# Patient Record
Sex: FEMALE | Race: WHITE | Employment: OTHER | URBAN - METROPOLITAN AREA
[De-identification: names, ages, dates, MRNs, and addresses within clinical notes are randomized per-mention and may not be internally consistent; named-entity substitution may affect disease eponyms.]

---

## 2019-06-04 ENCOUNTER — HOSPITAL ENCOUNTER (OUTPATIENT)
Dept: MAMMOGRAPHY | Age: 74
Discharge: HOME | End: 2019-06-04
Attending: INTERNAL MEDICINE
Payer: MEDICARE

## 2019-06-04 DIAGNOSIS — M85.89 OTHER SPECIFIED DISORDERS OF BONE DENSITY AND STRUCTURE, MULTIPLE SITES: ICD-10-CM

## 2019-06-04 PROCEDURE — 77080 DXA BONE DENSITY AXIAL: CPT

## 2020-11-30 ENCOUNTER — APPOINTMENT (OUTPATIENT)
Dept: GENERAL RADIOLOGY | Age: 75
DRG: 689 | End: 2020-11-30
Attending: EMERGENCY MEDICINE
Payer: MEDICARE

## 2020-11-30 ENCOUNTER — HOSPITAL ENCOUNTER (INPATIENT)
Age: 75
LOS: 10 days | Discharge: NURSING, REHAB OR LONG-TERM CARE | DRG: 689 | End: 2020-12-11
Attending: EMERGENCY MEDICINE | Admitting: HOSPITALIST
Payer: MEDICARE

## 2020-11-30 ENCOUNTER — APPOINTMENT (OUTPATIENT)
Dept: GENERAL RADIOLOGY | Age: 75
DRG: 689 | End: 2020-11-30
Attending: FAMILY MEDICINE
Payer: MEDICARE

## 2020-11-30 ENCOUNTER — APPOINTMENT (OUTPATIENT)
Dept: CT IMAGING | Age: 75
DRG: 689 | End: 2020-11-30
Attending: FAMILY MEDICINE
Payer: MEDICARE

## 2020-11-30 DIAGNOSIS — R50.81 NEUTROPENIC FEVER (HCC): ICD-10-CM

## 2020-11-30 DIAGNOSIS — D70.9 NEUTROPENIC FEVER (HCC): ICD-10-CM

## 2020-11-30 DIAGNOSIS — R29.898 WEAKNESS OF BOTH ARMS: ICD-10-CM

## 2020-11-30 DIAGNOSIS — D70.9 GRANULOCYTOPENIA (HCC): Primary | ICD-10-CM

## 2020-11-30 DIAGNOSIS — R50.9 FEVER, UNSPECIFIED FEVER CAUSE: ICD-10-CM

## 2020-11-30 DIAGNOSIS — R06.02 SOB (SHORTNESS OF BREATH): ICD-10-CM

## 2020-11-30 DIAGNOSIS — N30.00 ACUTE CYSTITIS WITHOUT HEMATURIA: ICD-10-CM

## 2020-11-30 DIAGNOSIS — G93.41 ACUTE METABOLIC ENCEPHALOPATHY: ICD-10-CM

## 2020-11-30 DIAGNOSIS — J18.9 PNEUMONIA OF LEFT LOWER LOBE DUE TO INFECTIOUS ORGANISM: ICD-10-CM

## 2020-11-30 DIAGNOSIS — N39.0 CHRONIC UTI (URINARY TRACT INFECTION): ICD-10-CM

## 2020-11-30 DIAGNOSIS — D46.9 MDS (MYELODYSPLASTIC SYNDROME) (HCC): ICD-10-CM

## 2020-11-30 LAB
ALBUMIN SERPL-MCNC: 2.3 G/DL (ref 3.5–5)
ALBUMIN/GLOB SERPL: 0.8 {RATIO} (ref 1.1–2.2)
ALP SERPL-CCNC: 77 U/L (ref 45–117)
ALT SERPL-CCNC: 21 U/L (ref 12–78)
ANION GAP SERPL CALC-SCNC: 2 MMOL/L (ref 5–15)
APPEARANCE UR: CLEAR
ARTERIAL PATENCY WRIST A: YES
AST SERPL-CCNC: 18 U/L (ref 15–37)
ATRIAL RATE: 102 BPM
BACTERIA URNS QL MICRO: ABNORMAL /HPF
BASE EXCESS BLD CALC-SCNC: 7 MMOL/L
BDY SITE: ABNORMAL
BILIRUB SERPL-MCNC: 0.5 MG/DL (ref 0.2–1)
BILIRUB UR QL: NEGATIVE
BUN SERPL-MCNC: 21 MG/DL (ref 6–20)
BUN/CREAT SERPL: 43 (ref 12–20)
CA-I BLD-SCNC: 1.24 MMOL/L (ref 1.12–1.32)
CALCIUM SERPL-MCNC: 8.6 MG/DL (ref 8.5–10.1)
CALCULATED P AXIS, ECG09: 45 DEGREES
CALCULATED R AXIS, ECG10: 23 DEGREES
CALCULATED T AXIS, ECG11: 47 DEGREES
CHLORIDE SERPL-SCNC: 110 MMOL/L (ref 97–108)
CO2 SERPL-SCNC: 30 MMOL/L (ref 21–32)
COLOR UR: ABNORMAL
COMMENT, HOLDF: NORMAL
CREAT SERPL-MCNC: 0.49 MG/DL (ref 0.55–1.02)
DIAGNOSIS, 93000: NORMAL
EPITH CASTS URNS QL MICRO: ABNORMAL /LPF
GAS FLOW.O2 O2 DELIVERY SYS: ABNORMAL L/MIN
GAS FLOW.O2 SETTING OXYMISER: 1 L/M
GLOBULIN SER CALC-MCNC: 2.8 G/DL (ref 2–4)
GLUCOSE SERPL-MCNC: 112 MG/DL (ref 65–100)
GLUCOSE UR STRIP.AUTO-MCNC: NEGATIVE MG/DL
HCO3 BLD-SCNC: 30.9 MMOL/L (ref 22–26)
HGB UR QL STRIP: ABNORMAL
KETONES UR QL STRIP.AUTO: NEGATIVE MG/DL
LACTATE SERPL-SCNC: 1.4 MMOL/L (ref 0.4–2)
LEUKOCYTE ESTERASE UR QL STRIP.AUTO: NEGATIVE
NITRITE UR QL STRIP.AUTO: NEGATIVE
P-R INTERVAL, ECG05: 170 MS
PCO2 BLD: 46.6 MMHG (ref 35–45)
PH BLD: 7.43 [PH] (ref 7.35–7.45)
PH UR STRIP: 5.5 [PH] (ref 5–8)
PO2 BLD: 83 MMHG (ref 80–100)
POTASSIUM SERPL-SCNC: 4.1 MMOL/L (ref 3.5–5.1)
PROCALCITONIN SERPL-MCNC: 0.11 NG/ML
PROT SERPL-MCNC: 5.1 G/DL (ref 6.4–8.2)
PROT UR STRIP-MCNC: 300 MG/DL
Q-T INTERVAL, ECG07: 316 MS
QRS DURATION, ECG06: 82 MS
QTC CALCULATION (BEZET), ECG08: 411 MS
RBC #/AREA URNS HPF: ABNORMAL /HPF (ref 0–5)
SAMPLES BEING HELD,HOLD: NORMAL
SAO2 % BLD: 96 % (ref 92–97)
SODIUM SERPL-SCNC: 142 MMOL/L (ref 136–145)
SP GR UR REFRACTOMETRY: 1.01 (ref 1–1.03)
SPECIMEN TYPE: ABNORMAL
TROPONIN I SERPL-MCNC: <0.05 NG/ML
UR CULT HOLD, URHOLD: NORMAL
UR CULT HOLD, URHOLD: NORMAL
UROBILINOGEN UR QL STRIP.AUTO: 0.2 EU/DL (ref 0.2–1)
VENTRICULAR RATE, ECG03: 102 BPM
WBC URNS QL MICRO: ABNORMAL /HPF (ref 0–4)

## 2020-11-30 PROCEDURE — 80053 COMPREHEN METABOLIC PANEL: CPT

## 2020-11-30 PROCEDURE — 71045 X-RAY EXAM CHEST 1 VIEW: CPT

## 2020-11-30 PROCEDURE — 36415 COLL VENOUS BLD VENIPUNCTURE: CPT

## 2020-11-30 PROCEDURE — 99218 HC RM OBSERVATION: CPT

## 2020-11-30 PROCEDURE — 74011000258 HC RX REV CODE- 258: Performed by: FAMILY MEDICINE

## 2020-11-30 PROCEDURE — 36600 WITHDRAWAL OF ARTERIAL BLOOD: CPT

## 2020-11-30 PROCEDURE — 73590 X-RAY EXAM OF LOWER LEG: CPT

## 2020-11-30 PROCEDURE — 74011000258 HC RX REV CODE- 258: Performed by: EMERGENCY MEDICINE

## 2020-11-30 PROCEDURE — 96368 THER/DIAG CONCURRENT INF: CPT

## 2020-11-30 PROCEDURE — 96375 TX/PRO/DX INJ NEW DRUG ADDON: CPT

## 2020-11-30 PROCEDURE — 74176 CT ABD & PELVIS W/O CONTRAST: CPT

## 2020-11-30 PROCEDURE — 74011250636 HC RX REV CODE- 250/636: Performed by: EMERGENCY MEDICINE

## 2020-11-30 PROCEDURE — 71250 CT THORAX DX C-: CPT

## 2020-11-30 PROCEDURE — 70450 CT HEAD/BRAIN W/O DYE: CPT

## 2020-11-30 PROCEDURE — 81001 URINALYSIS AUTO W/SCOPE: CPT

## 2020-11-30 PROCEDURE — 87635 SARS-COV-2 COVID-19 AMP PRB: CPT

## 2020-11-30 PROCEDURE — 84484 ASSAY OF TROPONIN QUANT: CPT

## 2020-11-30 PROCEDURE — 93005 ELECTROCARDIOGRAM TRACING: CPT

## 2020-11-30 PROCEDURE — 85025 COMPLETE CBC W/AUTO DIFF WBC: CPT

## 2020-11-30 PROCEDURE — 96365 THER/PROPH/DIAG IV INF INIT: CPT

## 2020-11-30 PROCEDURE — 96366 THER/PROPH/DIAG IV INF ADDON: CPT

## 2020-11-30 PROCEDURE — 96372 THER/PROPH/DIAG INJ SC/IM: CPT

## 2020-11-30 PROCEDURE — 74011250636 HC RX REV CODE- 250/636: Performed by: FAMILY MEDICINE

## 2020-11-30 PROCEDURE — 82803 BLOOD GASES ANY COMBINATION: CPT

## 2020-11-30 PROCEDURE — 84145 PROCALCITONIN (PCT): CPT

## 2020-11-30 PROCEDURE — 99285 EMERGENCY DEPT VISIT HI MDM: CPT

## 2020-11-30 PROCEDURE — 87040 BLOOD CULTURE FOR BACTERIA: CPT

## 2020-11-30 PROCEDURE — 83605 ASSAY OF LACTIC ACID: CPT

## 2020-11-30 RX ORDER — DOCUSATE SODIUM 100 MG/1
100 CAPSULE, LIQUID FILLED ORAL 2 TIMES DAILY
Status: DISCONTINUED | OUTPATIENT
Start: 2020-11-30 | End: 2020-12-11 | Stop reason: HOSPADM

## 2020-11-30 RX ORDER — PREGABALIN 150 MG/1
150 CAPSULE ORAL
COMMUNITY
End: 2021-04-12

## 2020-11-30 RX ORDER — SODIUM CHLORIDE 0.9 % (FLUSH) 0.9 %
5-40 SYRINGE (ML) INJECTION AS NEEDED
Status: DISCONTINUED | OUTPATIENT
Start: 2020-11-30 | End: 2020-12-11 | Stop reason: HOSPADM

## 2020-11-30 RX ORDER — VANCOMYCIN/0.9 % SOD CHLORIDE 1.5G/250ML
1500 PLASTIC BAG, INJECTION (ML) INTRAVENOUS ONCE
Status: COMPLETED | OUTPATIENT
Start: 2020-11-30 | End: 2020-12-01

## 2020-11-30 RX ORDER — SODIUM CHLORIDE 0.9 % (FLUSH) 0.9 %
5-40 SYRINGE (ML) INJECTION EVERY 8 HOURS
Status: DISCONTINUED | OUTPATIENT
Start: 2020-11-30 | End: 2020-12-11 | Stop reason: HOSPADM

## 2020-11-30 RX ORDER — DOXYCYCLINE 50 MG/1
50 CAPSULE ORAL 2 TIMES DAILY
COMMUNITY
End: 2020-12-11

## 2020-11-30 RX ORDER — BETHANECHOL CHLORIDE 10 MG/1
10 TABLET ORAL
COMMUNITY

## 2020-11-30 RX ORDER — POTASSIUM CHLORIDE 750 MG/1
20 CAPSULE, EXTENDED RELEASE ORAL 2 TIMES DAILY
COMMUNITY

## 2020-11-30 RX ORDER — PREGABALIN 75 MG/1
75 CAPSULE ORAL 2 TIMES DAILY
COMMUNITY

## 2020-11-30 RX ORDER — METRONIDAZOLE 500 MG/100ML
500 INJECTION, SOLUTION INTRAVENOUS EVERY 12 HOURS
Status: DISCONTINUED | OUTPATIENT
Start: 2020-11-30 | End: 2020-12-02

## 2020-11-30 RX ORDER — LEVOFLOXACIN 5 MG/ML
500 INJECTION, SOLUTION INTRAVENOUS
Status: COMPLETED | OUTPATIENT
Start: 2020-11-30 | End: 2020-12-01

## 2020-11-30 RX ORDER — HEPARIN SODIUM 5000 [USP'U]/ML
5000 INJECTION, SOLUTION INTRAVENOUS; SUBCUTANEOUS EVERY 8 HOURS
Status: DISCONTINUED | OUTPATIENT
Start: 2020-11-30 | End: 2020-12-11 | Stop reason: HOSPADM

## 2020-11-30 RX ORDER — SODIUM CHLORIDE 9 MG/ML
75 INJECTION, SOLUTION INTRAVENOUS CONTINUOUS
Status: DISCONTINUED | OUTPATIENT
Start: 2020-11-30 | End: 2020-12-06

## 2020-11-30 RX ORDER — ACETAMINOPHEN 325 MG/1
650 TABLET ORAL
Status: DISCONTINUED | OUTPATIENT
Start: 2020-11-30 | End: 2020-12-11 | Stop reason: HOSPADM

## 2020-11-30 RX ADMIN — VANCOMYCIN HYDROCHLORIDE 1500 MG: 10 INJECTION, POWDER, LYOPHILIZED, FOR SOLUTION INTRAVENOUS at 17:08

## 2020-11-30 RX ADMIN — CEFEPIME HYDROCHLORIDE 2 G: 2 INJECTION, POWDER, FOR SOLUTION INTRAVENOUS at 19:08

## 2020-11-30 RX ADMIN — PIPERACILLIN AND TAZOBACTAM 3.38 G: 3; .375 INJECTION, POWDER, LYOPHILIZED, FOR SOLUTION INTRAVENOUS at 13:22

## 2020-11-30 RX ADMIN — SODIUM CHLORIDE 1000 ML: 900 INJECTION, SOLUTION INTRAVENOUS at 13:22

## 2020-11-30 RX ADMIN — LEVOFLOXACIN 500 MG: 5 INJECTION, SOLUTION INTRAVENOUS at 14:54

## 2020-11-30 RX ADMIN — HEPARIN SODIUM 5000 UNITS: 5000 INJECTION INTRAVENOUS; SUBCUTANEOUS at 19:08

## 2020-11-30 NOTE — ED PROVIDER NOTES
HPI .  Patient has a history of chronic pain, degenerative vertebral joint disease, and peptic ulcer disease. Her  reports that she had a spinal fusion June 2019. She has had trouble ambulating to some degree since then.  reports that she had a bad reaction to a sulfa antibiotic March 2020 and lost some function in her hands and feet. Patient went to a urology appointment in [place] 5 days ago. She fell in the bathroom and injured her shins. Since then she has had a lot of trouble with steps. About 12 hours ago  noted that the patient was disoriented and confused. He has been helping her on and off the bedside commode the last 5 days and patient was trying to get up herself and had a blank confused look. EMS came at 4:30 AM but did not transport the patient. This morning the patient was still confused and  called EMS. EMS found a Glascow coma score of 15 with complete orientation although the patient does not answer simple questions very well. They noted a temperature of 100.5 and a pulse ox reading of 85% on room air. EMS started 2 L of oxygen and pulse ox went up to 96%. Patient's  noted that the urine smelled bad and color changed and he suspects a urinary tract infection. Patient has longstanding issues with kidney stones, urinary incontinence and incomplete voiding and is followed at [facility] urology. Patient denies having a headache and neck pain. Past Medical History:  
Diagnosis Date  Chronic pain  Chronic pain disorder 1984-current  Degenerated intervertebral disc  Melanosis of colon  Nausea & vomiting  Psychiatric disorder   
 chronic Pain  PUD (peptic ulcer disease) Past Surgical History:  
Procedure Laterality Date [address] UNLISTED  HX GYN    
 HX ORTHOPAEDIC    
 HX UROLOGICAL    
 NEUROLOGICAL PROCEDURE UNLISTED History reviewed. No pertinent family history. Social History Socioeconomic History  Marital status:  Spouse name: Not on file  Number of children: Not on file  Years of education: Not on file  Highest education level: Not on file Occupational History  Not on file Social Needs  Financial resource strain: Not on file  Food insecurity Worry: Not on file Inability: Not on file  Transportation needs Medical: Not on file Non-medical: Not on file Tobacco Use  Smoking status: Never Smoker Substance and Sexual Activity  Alcohol use: Yes Comment: 3 glasses of wine per night.  Drug use: No  
 Sexual activity: Not on file Lifestyle  Physical activity Days per week: Not on file Minutes per session: Not on file  Stress: Not on file Relationships  Social connections Talks on phone: Not on file Gets together: Not on file Attends Jewish service: Not on file Active member of club or organization: Not on file Attends meetings of clubs or organizations: Not on file Relationship status: Not on file  Intimate partner violence Fear of current or ex partner: Not on file Emotionally abused: Not on file Physically abused: Not on file Forced sexual activity: Not on file Other Topics Concern  Not on file Social History Narrative  Not on file ALLERGIES: Fentanyl and Duloxetine Review of Systems Reason unable to perform ROS: Patient has confusion and trouble answering some simple questions. Vitals:  
 11/30/20 1159 11/30/20 1201 BP: (!) 129/54 Pulse: (!) 103 Resp: 22 Temp: 99.3 °F (37.4 °C) SpO2: 97% 97% Physical Exam 
Vitals signs and nursing note reviewed. Constitutional:   
   Appearance: She is well-developed. HENT:  
   Head: Normocephalic and atraumatic. Eyes:  
   Pupils: Pupils are equal, round, and reactive to light. Neck: Musculoskeletal: Normal range of motion and neck supple. Cardiovascular:  
   Rate and Rhythm: Normal rate and regular rhythm. Heart sounds: Normal heart sounds. No murmur. No friction rub. No gallop. Pulmonary:  
   Effort: Pulmonary effort is normal. No respiratory distress. Breath sounds: No wheezing or rales. Abdominal:  
   Palpations: Abdomen is soft. Tenderness: There is no abdominal tenderness. There is no rebound. Musculoskeletal: Normal range of motion. General: No tenderness. Skin: 
   Findings: No erythema. Neurological:  
   Mental Status: She is alert. Cranial Nerves: No cranial nerve deficit. Comments: Motor; symmetric; oriented to name, president, place, year; she says it is December and it will be December tomorrow Psychiatric:     
   Behavior: Behavior normal.  
 
  
 
MDM Number of Diagnoses or Management Options Granulocytopenia ([facility] [address]):  
Pneumonia of left lower lobe due to infectious organism:  
Total critical care time spent exclusive of procedures:  60 minutes Procedures ED EKG interpretation: 
Rhythm: sinus tachycardia; and regular . Rate (approx.): 102; Axis: normal; P wave: normal; QRS interval: normal ; ST/T wave: normal; in  Lead: ; Other findings: . This EKG was interpreted by Edgar Ruiz MD,ED Provider. 1:05 PM 
 
 
 
  
 
 
Note: Patient has a hematologic issue possibly myelodysplastic syndrome. She is followed by a hematologist.  Patient's white blood count is very low with only 14% granulocytes and a high percentage of monocytes. Patient has a pulmonary infiltrate. Normal saline has been ordered. Triple antibiotics will be ordered after a weight is in the chart. Edgar Ruiz MD 
1:24 PM 
 
Perfect Serve Consult for Admission 1:27 PM 
 
ED Room Number: BH16/25 Patient Name and age:  Rafia Ross 70 y.o.  female Working Diagnosis: 1. Granulocytopenia ([facility] [address] 2. Pneumonia of left lower lobe due to infectious organism COVID-19 Suspicion:  yes Sepsis present:  yes  Reassessment needed: no 
Code Status:  Full Code Readmission: no 
Isolation Requirements:  yes Recommended Level of Care:  step down Department:[facility] Adult ED - [phone] Other:

## 2020-11-30 NOTE — PROGRESS NOTES
Admission Medication Reconciliation: In progress: 
 
Unable to speak with patient face to face at this time due to general isolation precautions in the ED related to COVID-19 pandemic. Spoke with her  by telephone, who states she uses only [facility] (mostly in [address] [place] [address]). Spoke with pharmacist at [facility] in [place], [place] @ [phone] who provided list of active medications. Please confirm with patient if possible. Notes: Allergy information updated to [facility] Doxycycline and nitrofurantoin each appear to be chronic therapies, pharmacist said each was prescribed by different physician. Per , receiving injections outpatient \"because her counts are bad\"-he was not able to elaborate. Stated also that her SULFA allergy \"is why she needs those shots. \" 
 
Medication changes (since last review): Added: 
Bethanechol Doxycycline Revised: 
Pregabalin: tales 75 mg twice daily and 150 mg QHS Nitrofurantoin Potassium chloride Deleted: 
Diclofenac Famotidine Fluticasone HCTZ Anusol rectal cream 
Methocarbamol Ondansetron Oxycodone Tramadol Triamterene/HCTZ Thank you for allowing me to participate in the care of your patient. Favian Germain PharmD, RN # [phone] ¹[employer] pharmacy benefit data reflects medications filled and processed through the patient's insurance, however  
this data does NOT capture whether the medication was picked up or is currently being taken by the patient. Allergies:  Fentanyl; Celebrex [celecoxib]; Duloxetine; and Sulfa (sulfonamide antibiotics) Significant PMH/Disease States:  
Past Medical History:  
Diagnosis Date Chronic pain Chronic pain disorder 1984-current Degenerated intervertebral disc Melanosis of colon Nausea & vomiting Psychiatric disorder   
 chronic Pain PUD (peptic ulcer disease) Chief Complaint for this Admission: Chief Complaint Patient presents with Fever Prior to Admission Medications:  
Prior to Admission Medications Prescriptions Last Dose Informant Taking? bethanechol (URECHOLINE) 10 mg tablet   No  
Sig: Take 10 mg by mouth Before breakfast, lunch, and dinner. docusate sodium (COLACE) 100 mg capsule   No  
Sig: Take 100 mg by mouth two (2) times a day. doxycycline (MONODOX) 50 mg capsule   No  
Sig: Take 50 mg by mouth two (2) times a day. nitrofurantoin (MACROBID) 100 mg capsule   No  
Sig: Take 100 mg by mouth two (2) times a day. potassium chloride SA (MICRO-K) 10 mEq capsule   No  
Sig: Take 20 mEq by mouth two (2) times a day. pregabalin (LYRICA) 150 mg capsule   No  
Sig: Take 150 mg by mouth nightly. Takes 75 mg BID and 150 mg QHS  
pregabalin (LYRICA) 75 mg capsule   No  
Sig: Take 75 mg by mouth two (2) times a day. Takes 75 mg BID and 150 mg QHS  
zaleplon (SONATA) 5 mg capsule   No  
Sig: Take 5 mg by mouth nightly. Facility-Administered Medications: None Please contact the main inpatient pharmacy with any questions or concerns at [phone] and we will direct you to the clinical pharmacist covering this patient's care while in-house.   
BIRGIT Rendon

## 2020-11-30 NOTE — H&P
History & Physical 
 
Primary Care Provider: Yuliana Gamez MD 
Source of Information: Patient and her  History of Presenting Illness:  
Audi Ernst is a 70 y.o. female who presents with altered mental status. History was primarily obtained from the patient's . Limited history could be obtained from the patient as patient is somewhat confused. Patient's  reports that the patient had a bad reaction to sulfa antibiotics in March 2020, since then she has had low blood counts and has been seeing [facility] for management. Patient was having recurrent UTI, went to [facility] 5 days ago at [facility] to see a urologist, was not able to see the urologist, was put on antibiotics. The  reports that since then she has had a downward spiral.  She has had a lot of trouble walking and has not been able to walk much which is new. She has become more confused and disoriented. He has been helping her to the bathroom, and since last night she has had more confusion, altered mental status, and spiked a fever. The  reported that he: Also called EMS yesterday night but patient was not transported to the ER last night. The  reports patient continues to be more confused. Patient came to the ER was found to have a temperature of 100.5 and a pulse ox of 85%. Patient was brought to the hospital for further management and evaluation. The  reports that patient has had stones in her bladder and has been on antibiotics and being evaluated by [facility] urology. Currently the patient is resting in bed, slightly confused, somewhat disoriented, but denies any other complaints or problems except for mild pain on the left side of her chest.  The  reports the patient fell down 4 days back and bruised her shins and has been complaining of some pain in both her shin.  
 
The patient denies any Headache, blurry vision, sore throat, trouble swallowing, trouble with speech, SOB, fever, chills, N/V/D, abd pain, urinary symptoms, constipation, recent travels, sick contacts, focal or generalized neurological symptoms,  falls, injuries, rashes, contact with COVID-19 diagnosed patients, hematemesis, melena, hemoptysis, hematuria, rashes, denies starting any new medications and denies any other concerns or problems besides as mentioned above. Review of Systems: A comprehensive review of systems was negative except for that written in the History of Present Illness. Patient is somewhat confused thus this was suboptimal 
 
Past Medical History:  
Diagnosis Date  Chronic pain  Chronic pain disorder 1984-current  Degenerated intervertebral disc  Melanosis of colon  Nausea & vomiting  Psychiatric disorder   
 chronic Pain  PUD (peptic ulcer disease) Past Surgical History:  
Procedure Laterality Date [address] UNLISTED  HX GYN    
 HX ORTHOPAEDIC    
 HX UROLOGICAL    
 NEUROLOGICAL PROCEDURE UNLISTED Prior to Admission medications Medication Sig Start Date End Date Taking? Authorizing Provider  
doxycycline (MONODOX) 50 mg capsule Take 50 mg by mouth two (2) times a day. Provider, Historical  
potassium chloride SA (MICRO-K) 10 mEq capsule Take 20 mEq by mouth two (2) times a day. Provider, Historical  
pregabalin (LYRICA) 75 mg capsule Take 75 mg by mouth two (2) times a day. Takes 75 mg BID and 150 mg QHS    Provider, Historical  
pregabalin (LYRICA) 150 mg capsule Take 150 mg by mouth nightly. Takes 75 mg BID and 150 mg QHS    Provider, Historical  
bethanechol (URECHOLINE) 10 mg tablet Take 10 mg by mouth Before breakfast, lunch, and dinner. Provider, Historical  
nitrofurantoin (MACROBID) 100 mg capsule Take 100 mg by mouth two (2) times a day. Provider, Historical  
docusate sodium (COLACE) 100 mg capsule Take 100 mg by mouth two (2) times a day.     Provider, Historical  
 zaleplon (SONATA) 5 mg capsule Take 5 mg by mouth nightly. Other, MD Page  
 
Allergies Allergen Reactions  Fentanyl Anaphylaxis Patch  Celebrex [Celecoxib] Other (comments)  
  11/30/2020: As per Florina Crawford pharmacist  
 Duloxetine Nausea Only Other reaction(s): Nausea Only  Sulfa (Sulfonamide Antibiotics) Other (comments)  
  11/20/2020: per  History reviewed. No pertinent family history. SOCIAL HISTORY: 
Patient resides: 
Independently x [facility] With family care Smoking history:  
None Former x Chronic Alcohol history:  
None x Social   
Chronic Ambulates:  
Independently   
w/cane x  
w/walker   
w/wc CODE STATUS: 
DNR Full x Other Objective:  
 
Physical Exam:  
 
Visit Vitals BP (!) 120/97 Pulse 83 Temp 99.3 °F (37.4 °C) Resp 16 Wt 60.8 kg (134 lb 0.6 oz) SpO2 96% BMI 23.01 kg/m² O2 Flow Rate (L/min): 3 l/min O2 Device: Nasal cannula General : alert x 3/4, slightly confused, pleasant female, somewhat disheveled HEENT: PEERL, EOMI, moist mucus membrane, TM clear Neck: supple, no JVD, no meningeal signs Chest: Decreased basal breath sounds, left greater than right CVS: S1 S2 heard, Capillary refill less than 2 seconds Abd: soft/ Non tender, non distended, BS physiological, Ext: no clubbing, no cyanosis, no edema, brisk 2+ DP pulses Neuro/Psych: pleasant mood and affect, CN 2-12 grossly intact, sensory grossly within normal limit, Strength 5/5 in all extremities, DTR 1+ x 4 Skin: warm EKG: Sinus tachycardia with nonspecific ST changes Data Review:  
 
Recent Days: 
Recent Labs 11/30/20 
1131 WBC 1.2* HGB 8.5* HCT 27.1*  
 Recent Labs 11/30/20 
1131   
K 4.1 * CO2 30 * BUN 21* CREA 0.49* CA 8.6 ALB 2.3* ALT 21 No results for input(s): PH, PCO2, PO2, HCO3, FIO2 in the last 72 hours. 24 Hour Results: Recent Results (from the past 24 hour(s)) EKG, 12 LEAD, INITIAL Collection Time: 11/30/20 11:23 AM  
Result Value Ref Range Ventricular Rate 102 BPM  
 Atrial Rate 102 BPM  
 P-R Interval 170 ms QRS Duration 82 ms Q-T Interval 316 ms  
 QTC Calculation (Bezet) 411 ms Calculated P Axis 45 degrees Calculated R Axis 23 degrees Calculated T Axis 47 degrees Diagnosis Sinus tachycardia No previous ECGs available CBC WITH AUTOMATED DIFF Collection Time: 11/30/20 11:31 AM  
Result Value Ref Range WBC 1.2 (L) 3.6 - 11.0 K/uL  
 RBC 2.49 (L) 3.80 - 5.20 M/uL HGB 8.5 (L) 11.5 - 16.0 g/dL HCT 27.1 (L) 35.0 - 47.0 % .8 (H) 80.0 - 99.0 FL  
 MCH 34.1 (H) 26.0 - 34.0 PG  
 MCHC 31.4 30.0 - 36.5 g/dL RDW 23.2 (H) 11.5 - 14.5 % PLATELET 117 743 - 871 K/uL MPV 11.3 8.9 - 12.9 FL  
 NRBC 1.7 (H) 0  WBC ABSOLUTE NRBC 0.02 (H) 0.00 - 0.01 K/uL NEUTROPHILS 14 (L) 32 - 75 % BAND NEUTROPHILS 4 0 - 6 % LYMPHOCYTES 31 12 - 49 % MONOCYTES 46 (H) 5 - 13 % EOSINOPHILS 2 0 - 7 % BASOPHILS 3 (H) 0 - 1 % IMMATURE GRANULOCYTES 0 %  
 ABS. NEUTROPHILS 0.2 (L) 1.8 - 8.0 K/UL  
 ABS. LYMPHOCYTES 0.4 (L) 0.8 - 3.5 K/UL  
 ABS. MONOCYTES 0.6 0.0 - 1.0 K/UL  
 ABS. EOSINOPHILS 0.0 0.0 - 0.4 K/UL  
 ABS. BASOPHILS 0.0 0.0 - 0.1 K/UL  
 ABS. IMM. GRANS. 0.0 K/UL  
 DF MANUAL    
 RBC COMMENTS ANISOCYTOSIS 2+ 
    
 RBC COMMENTS MACROCYTOSIS 1+ 
    
 RBC COMMENTS OVALOCYTES PRESENT 
    
 RBC COMMENTS SCHISTOCYTES PRESENT 
    
 RBC COMMENTS Pathology Review Requested WBC COMMENTS Differential performed on buffy coat smear. METABOLIC PANEL, COMPREHENSIVE Collection Time: 11/30/20 11:31 AM  
Result Value Ref Range Sodium 142 136 - 145 mmol/L Potassium 4.1 3.5 - 5.1 mmol/L Chloride 110 (H) 97 - 108 mmol/L  
 CO2 30 21 - 32 mmol/L Anion gap 2 (L) 5 - 15 mmol/L Glucose 112 (H) 65 - 100 mg/dL  BUN 21 (H) 6 - 20 MG/DL  
 Creatinine 0.49 (L) 0.55 - 1.02 MG/DL  
 BUN/Creatinine ratio 43 (H) 12 - 20 GFR est AA >60 >60 ml/min/1.73m2 GFR est non-AA >60 >60 ml/min/1.73m2 Calcium 8.6 8.5 - 10.1 MG/DL Bilirubin, total 0.5 0.2 - 1.0 MG/DL  
 ALT (SGPT) 21 12 - 78 U/L  
 AST (SGOT) 18 15 - 37 U/L Alk. phosphatase 77 45 - 117 U/L Protein, total 5.1 (L) 6.4 - 8.2 g/dL Albumin 2.3 (L) 3.5 - 5.0 g/dL Globulin 2.8 2.0 - 4.0 g/dL A-G Ratio 0.8 (L) 1.1 - 2.2 SAMPLES BEING HELD Collection Time: 11/30/20 11:31 AM  
Result Value Ref Range SAMPLES BEING HELD 1blu,1red,1UC   
 COMMENT Add-on orders for these samples will be processed based on acceptable specimen integrity and analyte stability, which may vary by analyte. LACTIC ACID Collection Time: 11/30/20 11:31 AM  
Result Value Ref Range Lactic acid 1.4 0.4 - 2.0 MMOL/L  
URINE CULTURE HOLD SAMPLE Collection Time: 11/30/20 11:31 AM  
 Specimen: Serum Result Value Ref Range Urine culture hold Urine on hold in Microbiology dept for 2 days. If unpreserved urine is submitted, it cannot be used for addtional testing after 24 hours, recollection will be required. URINALYSIS W/MICROSCOPIC Collection Time: 11/30/20  1:15 PM  
Result Value Ref Range Color YELLOW/STRAW Appearance CLEAR CLEAR Specific gravity 1.015 1.003 - 1.030    
 pH (UA) 5.5 5.0 - 8.0 Protein 300 (A) NEG mg/dL Glucose Negative NEG mg/dL Ketone Negative NEG mg/dL Bilirubin Negative NEG Blood TRACE (A) NEG Urobilinogen 0.2 0.2 - 1.0 EU/dL Nitrites Negative NEG Leukocyte Esterase Negative NEG    
 WBC 0-4 0 - 4 /hpf  
 RBC 0-5 0 - 5 /hpf Epithelial cells FEW FEW /lpf Bacteria 4+ (A) NEG /hpf URINE CULTURE HOLD SAMPLE Collection Time: 11/30/20  1:15 PM  
 Specimen: Serum; Urine Result Value Ref Range Urine culture hold Urine on hold in Microbiology dept for 2 days.   If unpreserved urine is submitted, it cannot be used for addtional testing after 24 hours, recollection will be required. SARS-COV-2 Collection Time: 11/30/20  1:24 PM  
Result Value Ref Range Specimen source Nasopharyngeal    
 SARS-CoV-2 PENDING   
 SARS-CoV-2 PENDING Specimen source Nasopharyngeal    
 COVID-19 rapid test PENDING Specimen type NP Swab Health status PENDING   
 COVID-19 PENDING Imaging: Xr Chest [facility] Result Date: 11/30/2020 IMPRESSION: Left lower lobe infiltrate and possible effusion suspicious for pneumonia in the appropriate clinical setting. There may be a small patch of airspace infiltrate in the right as well. Assessment:  
 
Left lower lobe pneumonia: Patient meets the criteria for neutropenic fever, no CBC available in the recent past, patient had some bone marrow dysfunction that was attributed to Bactrim for which she is being treated per , will admit patient on a telemetry bed, IV hydration, SARS-CoV-2 testing, oxygen support, pulse ox monitoring, pro-Vlad level, CT of the chest to rule out acute pathology, further intervention per hospital course, reassess as needed, blood cultures have been ordered Acute metabolic encephalopathy: Likely secondary to above, CT of the head, treat the underlying infection, IV hydration, neurovascular checks, may consider MRI of the brain, TSH, B12, folate levels, fall precautions, aspiration precautions, symptoms persist may consider further intervention and diagnostics, urine has been sent for culture, patient on antibiotics, continue monitor Anemia/leukopenia: Unclear whether ongoing versus worsening, patient meets the criteria for neutropenic fever, start IV hydration, IV antibiotics, closely monitor CBC, obtain heme-onc consult, no obvious signs of bleeding, reassess as needed, further intervention per hospital course, continue to monitor Acute hypoxic respiratory failure: Likely secondary to above, obtain a ABG, treat the underlying pneumonia, CT of the chest is pending, oxygen support, nebs as needed, supportive care, close monitoring, reassess as needed, if symptoms persist may consider further intervention and diagnostics GI DVT prophylaxis: Patient will be on heparin Signed By: Rose Sanchez MD   
 November 30, 2020

## 2020-11-30 NOTE — PROGRESS NOTES
Pharmacist Note - Vancomycin Dosing Consult provided for this 70 y.o. female for indication of sepsis. Antibiotic regimen(s): Vanc + Cefepime + Flagyl Patient on vancomycin PTA? NO Recent Labs 20 
1131 WBC 1.2*  
CREA 0.49* BUN 21* Frequency of BMP: daily x 3 Height: 162.6 cm Weight: 60.8 kg Est CrCl: 62 ml/min; UO: -- ml/kg/hr Temp (24hrs), Av.3 °F (37.4 °C), Min:99.3 °F (37.4 °C), Max:99.3 °F (37.4 °C) Cultures: 
 blood - pending Goal trough = 15 - 20 mcg/mL Therapy will be initiated with a loading dose of 1500 mg IV x 1 to be followed by a maintenance dose of 750 mg IV every 12 hours. Pharmacy to follow patient daily and order levels / make dose adjustments as appropriate.

## 2020-11-30 NOTE — PROGRESS NOTES
Clinical Pharmacy Note: Metronidazole Dosing Please note that the metronidazole dose for Demond Silva has been changed to 500 mg  q12h per [facility]-approved protocol. Please contact the pharmacy with any questions.  
 
Valente Odonnell

## 2020-11-30 NOTE — ED TRIAGE NOTES
Patient presents from home wit complaints of fever and low o2 sats.  Patient was found to be 88% on room air by EMS and was placed on 2 l NC

## 2020-12-01 LAB
ALBUMIN SERPL-MCNC: 1.9 G/DL (ref 3.5–5)
ALBUMIN/GLOB SERPL: 0.8 {RATIO} (ref 1.1–2.2)
ALP SERPL-CCNC: 62 U/L (ref 45–117)
ALT SERPL-CCNC: 14 U/L (ref 12–78)
ANION GAP SERPL CALC-SCNC: 9 MMOL/L (ref 5–15)
AST SERPL-CCNC: 8 U/L (ref 15–37)
BASOPHILS # BLD: 0 K/UL (ref 0–0.1)
BASOPHILS # BLD: 0.1 K/UL (ref 0–0.1)
BASOPHILS NFR BLD: 3 % (ref 0–1)
BASOPHILS NFR BLD: 5 % (ref 0–1)
BILIRUB SERPL-MCNC: 0.5 MG/DL (ref 0.2–1)
BUN SERPL-MCNC: 18 MG/DL (ref 6–20)
BUN/CREAT SERPL: 47 (ref 12–20)
CALCIUM SERPL-MCNC: 8.2 MG/DL (ref 8.5–10.1)
CHLORIDE SERPL-SCNC: 112 MMOL/L (ref 97–108)
CO2 SERPL-SCNC: 26 MMOL/L (ref 21–32)
COMMENT, HOLDF: NORMAL
CREAT SERPL-MCNC: 0.38 MG/DL (ref 0.55–1.02)
DIFFERENTIAL METHOD BLD: ABNORMAL
DIFFERENTIAL METHOD BLD: ABNORMAL
EOSINOPHIL # BLD: 0 K/UL (ref 0–0.4)
EOSINOPHIL # BLD: 0 K/UL (ref 0–0.4)
EOSINOPHIL NFR BLD: 0 % (ref 0–7)
EOSINOPHIL NFR BLD: 2 % (ref 0–7)
ERYTHROCYTE [DISTWIDTH] IN BLOOD BY AUTOMATED COUNT: 23 % (ref 11.5–14.5)
ERYTHROCYTE [DISTWIDTH] IN BLOOD BY AUTOMATED COUNT: 23.2 % (ref 11.5–14.5)
ERYTHROCYTE [SEDIMENTATION RATE] IN BLOOD: 61 MM/HR (ref 0–30)
GLOBULIN SER CALC-MCNC: 2.3 G/DL (ref 2–4)
GLUCOSE SERPL-MCNC: 104 MG/DL (ref 65–100)
HCT VFR BLD AUTO: 23.9 % (ref 35–47)
HCT VFR BLD AUTO: 27.1 % (ref 35–47)
HEALTH STATUS, XMCV2T: NORMAL
HGB BLD-MCNC: 7.2 G/DL (ref 11.5–16)
HGB BLD-MCNC: 8.5 G/DL (ref 11.5–16)
IMM GRANULOCYTES # BLD AUTO: 0 K/UL
IMM GRANULOCYTES # BLD AUTO: 0 K/UL
IMM GRANULOCYTES NFR BLD AUTO: 0 %
IMM GRANULOCYTES NFR BLD AUTO: 0 %
LYMPHOCYTES # BLD: 0.4 K/UL (ref 0.8–3.5)
LYMPHOCYTES # BLD: 0.5 K/UL (ref 0.8–3.5)
LYMPHOCYTES NFR BLD: 31 % (ref 12–49)
LYMPHOCYTES NFR BLD: 33 % (ref 12–49)
MCH RBC QN AUTO: 33.5 PG (ref 26–34)
MCH RBC QN AUTO: 34.1 PG (ref 26–34)
MCHC RBC AUTO-ENTMCNC: 30.1 G/DL (ref 30–36.5)
MCHC RBC AUTO-ENTMCNC: 31.4 G/DL (ref 30–36.5)
MCV RBC AUTO: 108.8 FL (ref 80–99)
MCV RBC AUTO: 111.2 FL (ref 80–99)
MONOCYTES # BLD: 0.6 K/UL (ref 0–1)
MONOCYTES # BLD: 0.6 K/UL (ref 0–1)
MONOCYTES NFR BLD: 42 % (ref 5–13)
MONOCYTES NFR BLD: 46 % (ref 5–13)
NEUTS BAND NFR BLD MANUAL: 4 % (ref 0–6)
NEUTS SEG # BLD: 0.2 K/UL (ref 1.8–8)
NEUTS SEG # BLD: 0.3 K/UL (ref 1.8–8)
NEUTS SEG NFR BLD: 14 % (ref 32–75)
NEUTS SEG NFR BLD: 20 % (ref 32–75)
NRBC # BLD: 0.02 K/UL (ref 0–0.01)
NRBC # BLD: 0.02 K/UL (ref 0–0.01)
NRBC BLD-RTO: 1.3 PER 100 WBC
NRBC BLD-RTO: 1.7 PER 100 WBC
PATH REV BLD -IMP: ABNORMAL
PLATELET # BLD AUTO: 216 K/UL (ref 150–400)
PLATELET # BLD AUTO: 274 K/UL (ref 150–400)
PMV BLD AUTO: 10.3 FL (ref 8.9–12.9)
PMV BLD AUTO: 11.3 FL (ref 8.9–12.9)
POTASSIUM SERPL-SCNC: 3.6 MMOL/L (ref 3.5–5.1)
PROT SERPL-MCNC: 4.2 G/DL (ref 6.4–8.2)
RBC # BLD AUTO: 2.15 M/UL (ref 3.8–5.2)
RBC # BLD AUTO: 2.49 M/UL (ref 3.8–5.2)
RBC MORPH BLD: ABNORMAL
SAMPLES BEING HELD,HOLD: NORMAL
SARS-COV-2, COV2: NOT DETECTED
SODIUM SERPL-SCNC: 147 MMOL/L (ref 136–145)
SOURCE, COVRS: NORMAL
SPECIMEN SOURCE, FCOV2M: NORMAL
SPECIMEN TYPE, XMCV1T: NORMAL
TROPONIN I SERPL-MCNC: <0.05 NG/ML
TSH SERPL DL<=0.05 MIU/L-ACNC: 1.64 UIU/ML (ref 0.36–3.74)
VIT B12 SERPL-MCNC: >2000 PG/ML (ref 193–986)
WBC # BLD AUTO: 1.2 K/UL (ref 3.6–11)
WBC # BLD AUTO: 1.5 K/UL (ref 3.6–11)
WBC MORPH BLD: ABNORMAL

## 2020-12-01 PROCEDURE — 99218 HC RM OBSERVATION: CPT

## 2020-12-01 PROCEDURE — 86038 ANTINUCLEAR ANTIBODIES: CPT

## 2020-12-01 PROCEDURE — 99223 1ST HOSP IP/OBS HIGH 75: CPT | Performed by: INTERNAL MEDICINE

## 2020-12-01 PROCEDURE — 74011250637 HC RX REV CODE- 250/637: Performed by: FAMILY MEDICINE

## 2020-12-01 PROCEDURE — 74011000258 HC RX REV CODE- 258: Performed by: FAMILY MEDICINE

## 2020-12-01 PROCEDURE — 85025 COMPLETE CBC W/AUTO DIFF WBC: CPT

## 2020-12-01 PROCEDURE — 74011250637 HC RX REV CODE- 250/637: Performed by: HOSPITALIST

## 2020-12-01 PROCEDURE — 74011250637 HC RX REV CODE- 250/637: Performed by: NURSE PRACTITIONER

## 2020-12-01 PROCEDURE — 82607 VITAMIN B-12: CPT

## 2020-12-01 PROCEDURE — 87086 URINE CULTURE/COLONY COUNT: CPT

## 2020-12-01 PROCEDURE — 84165 PROTEIN E-PHORESIS SERUM: CPT

## 2020-12-01 PROCEDURE — 65660000000 HC RM CCU STEPDOWN

## 2020-12-01 PROCEDURE — 96375 TX/PRO/DX INJ NEW DRUG ADDON: CPT

## 2020-12-01 PROCEDURE — 96372 THER/PROPH/DIAG INJ SC/IM: CPT

## 2020-12-01 PROCEDURE — 85652 RBC SED RATE AUTOMATED: CPT

## 2020-12-01 PROCEDURE — 84443 ASSAY THYROID STIM HORMONE: CPT

## 2020-12-01 PROCEDURE — 36415 COLL VENOUS BLD VENIPUNCTURE: CPT

## 2020-12-01 PROCEDURE — 87077 CULTURE AEROBIC IDENTIFY: CPT

## 2020-12-01 PROCEDURE — 74011250636 HC RX REV CODE- 250/636: Performed by: FAMILY MEDICINE

## 2020-12-01 PROCEDURE — 96376 TX/PRO/DX INJ SAME DRUG ADON: CPT

## 2020-12-01 PROCEDURE — 99223 1ST HOSP IP/OBS HIGH 75: CPT | Performed by: NURSE PRACTITIONER

## 2020-12-01 PROCEDURE — 80053 COMPREHEN METABOLIC PANEL: CPT

## 2020-12-01 PROCEDURE — 87186 SC STD MICRODIL/AGAR DIL: CPT

## 2020-12-01 PROCEDURE — 84484 ASSAY OF TROPONIN QUANT: CPT

## 2020-12-01 RX ORDER — OXYCODONE HYDROCHLORIDE 5 MG/1
5 TABLET ORAL
Status: DISCONTINUED | OUTPATIENT
Start: 2020-12-01 | End: 2020-12-11 | Stop reason: HOSPADM

## 2020-12-01 RX ORDER — PREGABALIN 75 MG/1
150 CAPSULE ORAL
Status: DISCONTINUED | OUTPATIENT
Start: 2020-12-01 | End: 2020-12-03

## 2020-12-01 RX ORDER — BALSAM PERU/CASTOR OIL
OINTMENT (GRAM) TOPICAL 3 TIMES DAILY
Status: DISCONTINUED | OUTPATIENT
Start: 2020-12-01 | End: 2020-12-11 | Stop reason: HOSPADM

## 2020-12-01 RX ADMIN — METRONIDAZOLE 500 MG: 500 INJECTION, SOLUTION INTRAVENOUS at 01:00

## 2020-12-01 RX ADMIN — Medication 10 ML: at 21:47

## 2020-12-01 RX ADMIN — HEPARIN SODIUM 5000 UNITS: 5000 INJECTION INTRAVENOUS; SUBCUTANEOUS at 17:05

## 2020-12-01 RX ADMIN — Medication 10 ML: at 04:42

## 2020-12-01 RX ADMIN — Medication: at 17:04

## 2020-12-01 RX ADMIN — HEPARIN SODIUM 5000 UNITS: 5000 INJECTION INTRAVENOUS; SUBCUTANEOUS at 09:45

## 2020-12-01 RX ADMIN — HEPARIN SODIUM 5000 UNITS: 5000 INJECTION INTRAVENOUS; SUBCUTANEOUS at 23:55

## 2020-12-01 RX ADMIN — ACETAMINOPHEN 650 MG: 325 TABLET ORAL at 19:07

## 2020-12-01 RX ADMIN — PREGABALIN 150 MG: 75 CAPSULE ORAL at 21:45

## 2020-12-01 RX ADMIN — CEFEPIME HYDROCHLORIDE 2 G: 2 INJECTION, POWDER, FOR SOLUTION INTRAVENOUS at 04:33

## 2020-12-01 RX ADMIN — CEFEPIME HYDROCHLORIDE 2 G: 2 INJECTION, POWDER, FOR SOLUTION INTRAVENOUS at 13:39

## 2020-12-01 RX ADMIN — VANCOMYCIN HYDROCHLORIDE 750 MG: 750 INJECTION, POWDER, LYOPHILIZED, FOR SOLUTION INTRAVENOUS at 06:16

## 2020-12-01 RX ADMIN — VANCOMYCIN HYDROCHLORIDE 750 MG: 750 INJECTION, POWDER, LYOPHILIZED, FOR SOLUTION INTRAVENOUS at 17:08

## 2020-12-01 RX ADMIN — DOCUSATE SODIUM 100 MG: 100 CAPSULE, LIQUID FILLED ORAL at 18:19

## 2020-12-01 RX ADMIN — Medication: at 23:52

## 2020-12-01 RX ADMIN — HEPARIN SODIUM 5000 UNITS: 5000 INJECTION INTRAVENOUS; SUBCUTANEOUS at 01:00

## 2020-12-01 RX ADMIN — DOCUSATE SODIUM 100 MG: 100 CAPSULE, LIQUID FILLED ORAL at 09:43

## 2020-12-01 RX ADMIN — METRONIDAZOLE 500 MG: 500 INJECTION, SOLUTION INTRAVENOUS at 04:41

## 2020-12-01 RX ADMIN — METRONIDAZOLE 500 MG: 500 INJECTION, SOLUTION INTRAVENOUS at 17:08

## 2020-12-01 RX ADMIN — SODIUM CHLORIDE 75 ML/HR: 900 INJECTION, SOLUTION INTRAVENOUS at 23:55

## 2020-12-01 RX ADMIN — Medication 10 ML: at 13:39

## 2020-12-01 RX ADMIN — CEFEPIME HYDROCHLORIDE 2 G: 2 INJECTION, POWDER, FOR SOLUTION INTRAVENOUS at 18:45

## 2020-12-01 RX ADMIN — OXYCODONE HYDROCHLORIDE 5 MG: 5 TABLET ORAL at 21:45

## 2020-12-01 RX ADMIN — Medication 10 ML: at 06:21

## 2020-12-01 NOTE — CONSULTS
Infectious Disease Consult Impression/Plan · Fever. Reportedly had temperature to 100.5 with oxygen sat of 88% on room air when evaluated by EMS this a.m. No recorded fever since admission. Patient is at risk for infection with neutropenia. History of recent UTI and malodorous urine however UA is bland. No evidence of pneumonia on CT scan. No intra-abdominal source of infection seen on CT of the abdomen and pelvis. Follow blood and urine cultures. Continue empiric antibiotics · Chronic leukopenia/neutropenia following adverse reaction to sulfa drug. Followed by [facility]. Details unclear at time of consultation. Hematology consult pending · Recently diagnosed UTI . · AMS Anti-infectives: 1. Vancomycin 2. Cefepime 3. Metronidazole Subjective:  
Date of Consultation:  December 1, 2020 Date of Admission: 11/30/2020 Referring Physician:  
 
Patient is a 70 y.o. female who is being seen for fever. Patient has a past medical history significant for chronic pain, peptic ulcer disease, and chronic leukopenia following an adverse drug reaction to a sulfa antibiotic in March 2020. She also reportedly lost some function in her hands and feet at that time. She was diagnosed with a urinary tract infection 5 days ago and started on an unknown antibiotic. The day prior to admission patient was found to be disoriented and confused. EMS was called and initially did not transport the patient to the hospital.   noticed that she was still confused on the morning of admission. At that time she was found to have a low-grade fever of 100.5 and a pulse ox of 88% by EMS. She was transported to [facility] for further evaluation and treatment. The patient has been afebrile since the time of admission. Work-up for source of fever has been unrevealing. She is currently on vancomycin and cefepime.   The infectious diseases service has been asked to assist with antibiotic management Patient Active Problem List  
Diagnosis Code  SOB (shortness of breath) R06.02 Past Medical History:  
Diagnosis Date  Chronic pain  Chronic pain disorder 1984-current  Degenerated intervertebral disc  Melanosis of colon  Nausea & vomiting  Psychiatric disorder   
 chronic Pain  PUD (peptic ulcer disease) History reviewed. No pertinent family history. Social History Tobacco Use  Smoking status: Never Smoker Substance Use Topics  Alcohol use: Yes Comment: 3 glasses of wine per night. Past Surgical History:  
Procedure Laterality Date [address] UNLISTED  HX GYN    
 HX ORTHOPAEDIC    
 HX UROLOGICAL    
 NEUROLOGICAL PROCEDURE UNLISTED Prior to Admission medications Medication Sig Start Date End Date Taking? Authorizing Provider  
potassium chloride SA (MICRO-K) 10 mEq capsule Take 20 mEq by mouth two (2) times a day. Yes Provider, Historical  
pregabalin (LYRICA) 75 mg capsule Take 75 mg by mouth two (2) times a day. Takes 75 mg BID and 150 mg QHS   Yes Provider, Historical  
pregabalin (LYRICA) 150 mg capsule Take 150 mg by mouth nightly. Takes 75 mg BID and 150 mg QHS   Yes Provider, Historical  
bethanechol (URECHOLINE) 10 mg tablet Take 10 mg by mouth Before breakfast, lunch, and dinner. Yes Provider, Historical  
zaleplon (SONATA) 5 mg capsule Take 5 mg by mouth nightly. Yes Other, MD Page  
doxycycline (MONODOX) 50 mg capsule Take 50 mg by mouth two (2) times a day. Provider, Historical  
nitrofurantoin (MACROBID) 100 mg capsule Take 100 mg by mouth two (2) times a day. Provider, Historical  
docusate sodium (COLACE) 100 mg capsule Take 100 mg by mouth two (2) times a day. Provider, Historical  
 
Allergies Allergen Reactions  Fentanyl Anaphylaxis Patch  Celebrex [Celecoxib] Other (comments)  
  11/30/2020:  As per [place] pharmacist  
  Duloxetine Nausea Only Other reaction(s): Nausea Only  Sulfa (Sulfonamide Antibiotics) Other (comments)  
  2020: per  Review of Systems:  A comprehensive review of systems was negative except for that written in the History of Present Illness. Objective:  
Blood pressure (!) 131/59, pulse 80, temperature 98.1 °F (36.7 °C), resp. rate 14, height 5' 4.02\" (1.626 m), weight 135 lb 1.6 oz (61.3 kg), SpO2 98 %. Temp (24hrs), Av.6 °F (37 °C), Min:98.1 °F (36.7 °C), Max:98.9 °F (37.2 °C) Exam:   
General:  Alert, cooperative, well noursished, well developed, appears stated age Eyes:  Sclera anicteric. Mouth/Throat: MMM Neck: Supple Lungs:   Clear to auscultation bilaterally, good effort CV:  Regular rate and rhythm Abdomen:   Soft, non-tender. ND Extremities: No edema Skin: No rash Lymph nodes: Musculoskeletal: No swelling or deformity Lines/Devices: Psych: Alert and oriented, normal mood affect given the setting Data Review:  
Recent Results (from the past 24 hour(s)) URINALYSIS W/MICROSCOPIC Collection Time: 20  1:15 PM  
Result Value Ref Range Color YELLOW/STRAW Appearance CLEAR CLEAR Specific gravity 1.015 1.003 - 1.030    
 pH (UA) 5.5 5.0 - 8.0 Protein 300 (A) NEG mg/dL Glucose Negative NEG mg/dL Ketone Negative NEG mg/dL Bilirubin Negative NEG Blood TRACE (A) NEG Urobilinogen 0.2 0.2 - 1.0 EU/dL Nitrites Negative NEG Leukocyte Esterase Negative NEG    
 WBC 0-4 0 - 4 /hpf  
 RBC 0-5 0 - 5 /hpf Epithelial cells FEW FEW /lpf Bacteria 4+ (A) NEG /hpf URINE CULTURE HOLD SAMPLE Collection Time: 20  1:15 PM  
 Specimen: Serum; Urine Result Value Ref Range Urine culture hold Urine on hold in Microbiology dept for 2 days. If unpreserved urine is submitted, it cannot be used for addtional testing after 24 hours, recollection will be required.   
SARS-COV-2  
 Collection Time: 11/30/20  1:24 PM  
Result Value Ref Range Specimen source Nasopharyngeal    
 SARS-CoV-2 Not detected NOTD Specimen source Nasopharyngeal    
 Specimen type NP Swab Health status Symptomatic Testing POC EG7 Collection Time: 11/30/20  6:07 PM  
Result Value Ref Range Calcium, ionized (POC) 1.24 1.12 - 1.32 mmol/L  
 pH (POC) 7.43 7.35 - 7.45    
 pCO2 (POC) 46.6 (H) 35.0 - 45.0 MMHG  
 pO2 (POC) 83 80 - 100 MMHG  
 HCO3 (POC) 30.9 (H) 22 - 26 MMOL/L Base excess (POC) 7 mmol/L  
 sO2 (POC) 96 92 - 97 % Site RIGHT RADIAL Device: NASAL CANNULA Flow rate (POC) 1 L/M Allens test (POC) YES Specimen type (POC) ARTERIAL    
TROPONIN I Collection Time: 12/01/20  4:22 AM  
Result Value Ref Range Troponin-I, Qt. <0.05 <0.05 ng/mL METABOLIC PANEL, COMPREHENSIVE Collection Time: 12/01/20  4:22 AM  
Result Value Ref Range Sodium 147 (H) 136 - 145 mmol/L Potassium 3.6 3.5 - 5.1 mmol/L Chloride 112 (H) 97 - 108 mmol/L  
 CO2 26 21 - 32 mmol/L Anion gap 9 5 - 15 mmol/L Glucose 104 (H) 65 - 100 mg/dL BUN 18 6 - 20 MG/DL Creatinine 0.38 (L) 0.55 - 1.02 MG/DL  
 BUN/Creatinine ratio 47 (H) 12 - 20 GFR est AA >60 >60 ml/min/1.73m2 GFR est non-AA >60 >60 ml/min/1.73m2 Calcium 8.2 (L) 8.5 - 10.1 MG/DL Bilirubin, total 0.5 0.2 - 1.0 MG/DL  
 ALT (SGPT) 14 12 - 78 U/L  
 AST (SGOT) 8 (L) 15 - 37 U/L Alk. phosphatase 62 45 - 117 U/L Protein, total 4.2 (L) 6.4 - 8.2 g/dL Albumin 1.9 (L) 3.5 - 5.0 g/dL Globulin 2.3 2.0 - 4.0 g/dL A-G Ratio 0.8 (L) 1.1 - 2.2    
CBC WITH AUTOMATED DIFF Collection Time: 12/01/20  4:22 AM  
Result Value Ref Range WBC 1.5 (L) 3.6 - 11.0 K/uL  
 RBC 2.15 (L) 3.80 - 5.20 M/uL HGB 7.2 (L) 11.5 - 16.0 g/dL HCT 23.9 (L) 35.0 - 47.0 % .2 (H) 80.0 - 99.0 FL  
 MCH 33.5 26.0 - 34.0 PG  
 MCHC 30.1 30.0 - 36.5 g/dL RDW 23.0 (H) 11.5 - 14.5 % PLATELET 548 410 - 631 K/uL MPV 10.3 8.9 - 12.9 FL  
 NRBC 1.3 (H) 0  WBC ABSOLUTE NRBC 0.02 (H) 0.00 - 0.01 K/uL NEUTROPHILS 20 (L) 32 - 75 % LYMPHOCYTES 33 12 - 49 % MONOCYTES 42 (H) 5 - 13 % EOSINOPHILS 0 0 - 7 % BASOPHILS 5 (H) 0 - 1 % IMMATURE GRANULOCYTES 0 %  
 ABS. NEUTROPHILS 0.3 (L) 1.8 - 8.0 K/UL  
 ABS. LYMPHOCYTES 0.5 (L) 0.8 - 3.5 K/UL  
 ABS. MONOCYTES 0.6 0.0 - 1.0 K/UL  
 ABS. EOSINOPHILS 0.0 0.0 - 0.4 K/UL  
 ABS. BASOPHILS 0.1 0.0 - 0.1 K/UL  
 ABS. IMM. GRANS. 0.0 K/UL  
 DF MANUAL    
 RBC COMMENTS OVALOCYTES PRESENT 
    
 RBC COMMENTS ANISOCYTOSIS 2+ 
    
 RBC COMMENTS MACROCYTOSIS 2+ SAMPLES BEING HELD Collection Time: 12/01/20  4:22 AM  
Result Value Ref Range SAMPLES BEING HELD 1BLU,1RED COMMENT Add-on orders for these samples will be processed based on acceptable specimen integrity and analyte stability, which may vary by analyte. Microbiology:   
 
Studies:  
 
Signed By: Ramona Gonzalez DO   
 December 1, 2020

## 2020-12-01 NOTE — PROGRESS NOTES
Reason for Admission:  Admitted from home with complaints of altered mental status. Recently treated for UTI. RUR Score:   Observation Plan for utilizing home health: Independent at baseline. Uses cane to assist with ambulation. Will need to be assessed by PT/OT to determine the level of care at discharge. PCP: First and Last name:  Jacques Madden 
 Name of Practice:  
 Are you a current patient: Yes/No: YES Approximate date of last visit: 11/2020 Can you participate in a virtual visit with your PCP: NO 
                 
Current Advanced Directive/Advance Care Plan: No AMD and unable to complete secondary to confusion.  is NOK. Transition of Care Plan:  
Consult for Hem/Onc consulted for low wbc ID consult for UTI Will need PT/OT to assess of level of care needed at discharge. Medicare pt has received, reviewed, and signed  IM letter informing them of their right to appeal the discharge. Signed copy has been placed on pt bedside chart. Observation notice provided in writing to patient and/or caregiver as well as verbal explanation of the policy. Patients who are in outpatient status also receive the Observation notice. Reviewed with  by phone. Care Management Interventions PCP Verified by CM: Yes(11/2020) Mode of Transport at Discharge: (private car) Current Support Network: Own Home, Lives with Spouse Confirm Follow Up Transport: Family The Patient and/or Patient Representative was Provided with a Choice of Provider and Agrees with the Discharge Plan?: ( Betina Perkins) [address] Provided?: No 
Mrak Millan RN CRM Ext [phone]

## 2020-12-01 NOTE — PROGRESS NOTES
1530: Bedside shift change report given to Jory (oncoming nurse) by Rosita Ray (offgoing nurse). Report included the following information SBAR, Kardex, ED Summary, Procedure Summary, Intake/Output, MAR, Recent Results and Cardiac Rhythm SR.  
 
1630: Assessed patient, A/O X4, SR, placed on 2 L NC SPO2 greater than 92%. Upper and lower extremities weak, pulses palpable. BLE skin tears, dressings intact. 1726: IV infiltrated, 22 placed in the left AC, restarted fluids and antibiotics. 1730:  
 
TRANSFER - OUT REPORT: 
 
Verbal report given to [address] Charleen (name) on [facility]  being transferred to [facility] (unit) for routine progression of care Report consisted of patients Situation, Background, Assessment and  
Recommendations(SBAR). Information from the following report(s) SBAR, Kardex, ED Summary, Procedure Summary, Intake/Output, MAR, Recent Results and Cardiac Rhythm SR was reviewed with the receiving nurse. Lines:  
Peripheral IV 12/01/20 Left Antecubital (Active) Site Assessment Clean, dry, & intact 12/01/20 1726 Phlebitis Assessment 0 12/01/20 1726 Infiltration Assessment 0 12/01/20 1726 Dressing Status Clean, dry, & intact 12/01/20 1726 Dressing Type Transparent 12/01/20 1726 Hub Color/Line Status Blue; Infusing 12/01/20 1726 Action Taken Open ports on tubing capped 12/01/20 1726 Alcohol Cap Used Yes 12/01/20 1726 Opportunity for questions and clarification was provided. Patient transported with: 
 O2 @ 2 liters

## 2020-12-01 NOTE — PROGRESS NOTES
[facility] Hospitalist Progress Note Natasha Ch MD 
Answering service: [phone] OR [phone] from in house phone Date of Service:  2020 NAME:  Marlene Pena :  1945 MRN:  [id number] Admission Summary:  
70 y.o F with PMH of spine surgery, anemia,neutropenia admitted due to AMS Interval history / Subjective:  
Patient seen and examined on . Patient is more alert and oriented. Her  is at bedside-states that she is much better today. She has history of spine surgeries-has been seeing PT/OT for last few months-both patient and her  states that her mobility has been affected/becoming more weak in the last few months. She is scheduled to see Dr. Rosie Delgado as outpatient per report. She has been seeing urologist at [facility] for renal stones and urinary incontinence. Assessment & Plan: #Fever: 
-SARS-CoV-2 negative. CT chest showed bilateral pleural effusion-no obvious evidence of pneumonia or any infection on CT chest and abdomen. She is currently on broad-spectrum antibiotics Vanco cefepime and Flagyl. Preliminary blood cultures are negative. Urine cultures pending. 
-Fever curve better today #Leukopenia and thrombocytopenia-myelodysplastic syndrome: 
-hematology following 
-on luspatercept as OP, per hematology note -she had neutropenia in the past. 
 
#Bilateral UE and LE weakness: 
-h/o axonal polyneuropathy, lumbar spine surgery 
-per patient/ request-consulted neurologist 
-CT brain- Indeterminate hyperdensity just above the sella turcica posteriorly, moderate tp severe temporal predominant degree of cerebral atrophy. MRI brain and C spine ordered by neurology team -follow up reuslts #Acute encephalopathy: resolved Code status: full DVT prophylaxis: scd Care Plan discussed with: Livia Fields Anticipated Disposition: rehab Anticipated Discharge:tbd Hospital Problems  Never Reviewed Codes Class Noted POA  
 SOB (shortness of breath) ICD-10-CM: R06.02 
ICD-9-CM: 786.05  11/30/2020 Unknown Review of Systems: As per HPI Vital Signs:  
 Last 24hrs VS reviewed since prior progress note. Most recent are: 
Visit Vitals /65 (BP 1 Location: Left arm, BP Patient Position: At rest) Pulse 97 Temp 98.4 °F (36.9 °C) Resp 20 Ht 5' 4.02\" (1.626 m) Wt 61.3 kg (135 lb 1.6 oz) SpO2 93% BMI 23.18 kg/m² Intake/Output Summary (Last 24 hours) at 12/1/2020 9031 Last data filed at 12/1/2020 0730 Gross per 24 hour Intake 550 ml Output  Net 550 ml Physical Examination:  
 
I had a face to face encounter with this patient and independently examined them on 12/1/2020 as outlined below: 
 
     
Constitutional:  No acute distress, elderly patient ENT:  Oral mucosa moist, oropharynx benign,EOMI Resp:  decreased breath sounds at based. No wheezing/rhonchi/rales. No accessory muscle use CV:  Regular rhythm, normal rate, no murmurs GI:  Soft, non distended, non tender. normoactive bowel sounds Musculoskeletal:  No LE edema Neurologic:  alert and oriented X 3, b/l UE and LE weakness Psych:  not anxious nor agitated. Data Review:  
 Review and/or order of clinical lab test 
Review and/or order of tests in the radiology section of CPT Review and/or order of tests in the medicine section of CPT Labs:  
 
Recent Labs 12/01/20 0422 11/30/20 
1131 WBC 1.5* 1.2* HGB 7.2* 8.5* HCT 23.9* 27.1*  
 274 Recent Labs 12/01/20 0422 11/30/20 
1131 * 142  
K 3.6 4.1 * 110* CO2 26 30 BUN 18 21* CREA 0.38* 0.49* * 112* CA 8.2* 8.6 Recent Labs 12/01/20 0422 11/30/20 
1131 ALT 14 21 AP 62 77 TBILI 0.5 0.5 TP 4.2* 5.1* ALB 1.9* 2.3*  
GLOB 2.3 2.8 No results for input(s): INR, PTP, APTT, INREXT in the last 72 hours. No results for input(s): FE, TIBC, PSAT, FERR in the last 72 hours. No results found for: FOL, RBCF No results for input(s): PH, PCO2, PO2 in the last 72 hours. Recent Labs 12/01/20 
0422 11/30/20 
1131 TROIQ <0.05 <0.05 No results found for: CHOL, CHOLX, CHLST, CHOLV, HDL, HDLP, LDL, LDLC, DLDLP, TGLX, TRIGL, TRIGP, CHHD, CHHDX No results found for: [facility] Results Component Value Date/Time Color YELLOW/STRAW 11/30/2020 01:15 PM  
 Appearance CLEAR 11/30/2020 01:15 PM  
 Specific gravity 1.015 11/30/2020 01:15 PM  
 pH (UA) 5.5 11/30/2020 01:15 PM  
 Protein 300 (A) 11/30/2020 01:15 PM  
 Glucose Negative 11/30/2020 01:15 PM  
 Ketone Negative 11/30/2020 01:15 PM  
 Bilirubin Negative 11/30/2020 01:15 PM  
 Urobilinogen 0.2 11/30/2020 01:15 PM  
 Nitrites Negative 11/30/2020 01:15 PM  
 Leukocyte Esterase Negative 11/30/2020 01:15 PM  
 Epithelial cells FEW 11/30/2020 01:15 PM  
 Bacteria 4+ (A) 11/30/2020 01:15 PM  
 WBC 0-4 11/30/2020 01:15 PM  
 RBC 0-5 11/30/2020 01:15 PM  
 
 
 
Medications Reviewed:  
 
Current Facility-Administered Medications Medication Dose Route Frequency  balsam peru-castor oiL (VENELEX) ointment   Topical TID  [START ON 12/3/2020] epoetin annie-epbx (RETACRIT) injection 20,000 Units  20,000 Units SubCUTAneous Q7D  
 docusate sodium (COLACE) capsule 100 mg  100 mg Oral BID  sodium chloride (NS) flush 5-40 mL  5-40 mL IntraVENous Q8H  
 sodium chloride (NS) flush 5-40 mL  5-40 mL IntraVENous PRN  
 0.9% sodium chloride infusion  75 mL/hr IntraVENous CONTINUOUS  
 acetaminophen (TYLENOL) tablet 650 mg  650 mg Oral Q4H PRN  
 heparin (porcine) injection 5,000 Units  5,000 Units SubCUTAneous Q8H  
 cefepime (MAXIPIME) 2 g in 0.9% sodium chloride (MBP/ADV) 100 mL MBP  2 g IntraVENous Q8H  
 metroNIDAZOLE (FLAGYL) IVPB premix 500 mg  500 mg IntraVENous Q12H  Vancomycin - pharmacy to dose   Other Rx Dosing/Monitoring  vancomycin (VANCOCIN) 750 mg in 0.9% sodium chloride 250 mL (VIAL-MATE)  750 mg IntraVENous Q12H  
 
______________________________________________________________________ EXPECTED LENGTH OF STAY: - - - 
ACTUAL LENGTH OF STAY:          0 Steffi Decker MD

## 2020-12-01 NOTE — WOUND CARE
WOCN Note:  
 
New consult placed for assessment of left low leg. Leg abrasions from fall prior to admit. Assessed in room 400. PPE: face shield, mask and gloves.  at the bedside. Josephine Mocoleen ID NP at the bedside. Chart reviewed. Admitted DX: SOB Assessment:  
Patient is A&O x 3, communicative and requires assist of 2 with repositioning. Bed: [facility] with p500 air mattress Patient wearing briefs for incontinence. Patient reports no pain. Patient repositioned on left side with pillow. Heels offloaded with pillows. Heels intact with blanching red erythema. Sacrum and buttocks intact with blanching red erythema. 1. POA Left lower leg, abrasion from fall : 5 x 3 x 0.1 cm  100% pink. no exudate; no odor. Periwound intact from erythema. Wound, Pressure Prevention & Skin Care Recommendations: 1. Minimize layers of linen/pads under patient to optimize support surface. 2.  Turn/reposition approximately every 2 hours and offload heels. 3.  Manage moisture/ Keep skin folds clean and dry. 4.  Specialty bed: p500 air mattress 5. Left low leg:  Daily cleanse with saline; apply Xeroform and dry dressing. 6.  Sacrum, buttocks and heels:  Venelex TID. Discussed above plan with patient and Abbi Small. Transition of Care: Plan to follow as needed while admitted to hospital. 
 
MARVIN HartN RN [facility] Inpatient Wound Care Available on Perfect Serve Pager [phone] Office [phone]

## 2020-12-01 NOTE — PROGRESS NOTES
Verbal shift change report given to Gato Chou RN (oncoming nurse) by Juan Lanier RN (offgoing nurse). Report included the following information SBAR, Kardex, ED Summary, Procedure Summary, Intake/Output, MAR, Accordion, Recent Results, Med Rec Status, and Cardiac Rhythm NSR/ST . Patient Vitals for the past 12 hrs: 
 Temp Pulse Resp BP SpO2  
12/01/20 0806 98.1 °F (36.7 °C) 80 14 (!) 131/59 98 % 12/01/20 0210 98.5 °F (36.9 °C) 78 14 (!) 113/55 99 % 12/01/20 0033  88 13 134/65 99 % 12/01/20 0000  74 13  98 % 11/30/20 2330  81 16  98 % 11/30/20 2300  82 17  97 % 11/30/20 2230  84 16  95 % 11/30/20 2200  94 23  96 % 11/30/20 2130  83 21  98 % Last 3 Recorded Weights in this Encounter 11/30/20 1323 12/01/20 3842 Weight: 60.8 kg (134 lb 0.6 oz) 61.3 kg (135 lb 1.6 oz) DUAL SKIN Primary Nurse Sarah Villalta and Leo Sandoval RN performed a dual skin assessment on this patient Impairment noted- see wound doc flow sheet. Abrasions on bilateral lower and upper extremities. Wound on LLE shin. Scab on RKnee. Red, blanchable sacrum. Neeraj score is 17 TRANSFER - IN REPORT: 
 
Verbal report received from [place] (name) on [facility]  being received from ED (unit) for routine progression of care Report consisted of patients Situation, Background, Assessment and  
Recommendations(SBAR). Information from the following report(s) SBAR, Kardex, ED Summary, Intake/Output, MAR, Accordion, Recent Results and Cardiac Rhythm NSR/ST was reviewed with the receiving nurse. Opportunity for questions and clarification was provided. Assessment completed upon patients arrival to unit and care assumed. Problem: Falls - Risk of 
Goal: *Absence of Falls Description: Document Tasha Freedman Fall Risk and appropriate interventions in the flowsheet. 12/1/2020 0919 by Lianet Lawton Outcome: Progressing Towards Goal 
Note: Fall Risk Interventions: Mobility Interventions: Assess mobility with egress test, OT consult for ADLs, Patient to call before getting OOB, PT Consult for mobility concerns, PT Consult for assist device competence, Utilize walker, cane, or other assistive device Mentation Interventions: Adequate sleep, hydration, pain control, Evaluate medications/consider consulting pharmacy, Increase mobility, More frequent rounding, Reorient patient, Toileting rounds, Update white board Medication Interventions: Evaluate medications/consider consulting pharmacy, Patient to call before getting OOB, Teach patient to arise slowly Elimination Interventions: Call light in reach, Toilet paper/wipes in reach, Toileting schedule/hourly rounds History of Falls Interventions: Consult care management for discharge planning, Door open when patient unattended, Evaluate medications/consider consulting pharmacy, Investigate reason for fall 
 
 
12/1/2020 0912 by Ady Heller Outcome: Progressing Towards Goal 
Note: Fall Risk Interventions: 
Mobility Interventions: Assess mobility with egress test, OT consult for ADLs, Patient to call before getting OOB, PT Consult for mobility concerns, PT Consult for assist device competence, Utilize walker, cane, or other assistive device Mentation Interventions: Adequate sleep, hydration, pain control, Evaluate medications/consider consulting pharmacy, Increase mobility, More frequent rounding, Reorient patient, Toileting rounds, Update white board Medication Interventions: Evaluate medications/consider consulting pharmacy, Patient to call before getting OOB, Teach patient to arise slowly Elimination Interventions: Call light in reach, Toilet paper/wipes in reach, Toileting schedule/hourly rounds History of Falls Interventions: Consult care management for discharge planning, Door open when patient unattended, Evaluate medications/consider consulting pharmacy, Investigate reason for fall Problem: Patient Education: Go to Patient Education Activity Goal: Patient/Family Education Outcome: Progressing Towards Goal 
  
Problem: Pressure Injury - Risk of 
Goal: *Prevention of pressure injury Description: Document Neeraj Scale and appropriate interventions in the flowsheet. Outcome: Progressing Towards Goal 
Note: Pressure Injury Interventions: 
Sensory Interventions: Assess changes in LOC, Assess need for specialty bed, Discuss PT/OT consult with provider, Keep linens dry and wrinkle-free, Maintain/enhance activity level, Minimize linen layers, Monitor skin under medical devices, Pad between skin to skin, Pressure redistribution bed/mattress (bed type) Moisture Interventions: Absorbent underpads, Apply protective barrier, creams and emollients, Check for incontinence Q2 hours and as needed, Internal/External urinary devices, Maintain skin hydration (lotion/cream), Limit adult briefs, Minimize layers Activity Interventions: Assess need for specialty bed, Increase time out of bed, Pressure redistribution bed/mattress(bed type) Mobility Interventions: Assess need for specialty bed, Pressure redistribution bed/mattress (bed type), PT/OT evaluation Nutrition Interventions: Document food/fluid/supplement intake, Offer support with meals,snacks and hydration Problem: Risk for Spread of Infection Goal: Prevent transmission of infectious organism to others Description: Prevent the transmission of infectious organisms to other patients, staff members, and visitors. 12/1/2020 0919 by Vu Andersen Outcome: Progressing Towards Goal 
Note: Pt in negative pressure room. Gown, gloves, respirator, and face shield worn when in room. Pt on droplet plus precautions 12/1/2020 0912 by Vu Andersen Outcome: Progressing Towards Goal 
Note: Pt in negative pressure room. Gown, gloves, respirator, and face shield worn when in room. Pt on droplet plus precautions Problem: Pain Goal: *Control of Pain 12/1/2020 0919 by Amilcar Cook Outcome: Progressing Towards Goal 
Note: Pt had no complaints of pain overnight 
12/1/2020 0912 by Amilcar Cook Outcome: Progressing Towards Goal 
Note: Pt did not complain of pain overnight.

## 2020-12-01 NOTE — ED NOTES
Verbal shift change report given to [address] (oncoming nurse) by Micheal Ford (offgoing nurse). Report included the following information SBAR, Kardex, ED Summary, [facility] and Recent Results.

## 2020-12-01 NOTE — ROUTINE PROCESS
TRANSFER - OUT REPORT: 
 
Verbal report given to Leticia Watson (name) on [address]  being transferred to [facility] (unit) for routine progression of care Report consisted of patients Situation, Background, Assessment and  
Recommendations(SBAR). Information from the following report(s) SBAR, ED Summary and Recent Results was reviewed with the receiving nurse. Lines:  
Peripheral IV 11/30/20 Left Hand (Active) Site Assessment Clean, dry, & intact 11/30/20 1157 Phlebitis Assessment 0 11/30/20 1157 Infiltration Assessment 0 11/30/20 1157 Dressing Status Clean, dry, & intact 11/30/20 1157 Opportunity for questions and clarification was provided. Patient transported with: 
 [employer]

## 2020-12-01 NOTE — CONSULTS
Neurology  Consult Kitty Rosas FNP-C Neurology NP 
[phone] Patient: Marlene Pena MRN: [id number]  SSN: [id number] YOB: 1945  Age: 70 y.o. Sex: female Chief Complaint:Chronic Upper and lower weakness Subjective:  
  
Marlene Pena is a 70 y.o. female who is being seen for confusion, UTI, fever. Neurology was consulted for chronic bilateral upper and lower extremity weakness. history of extensive back surgery with fusion from her thoracic to her ilium in lumbar spine by Neurosurgery at [facility]. last surgery completed in 2019 due to similar symptoms in her lower extremities.  is not at the bedside, so majority of the information is obtained from chart and patient. The patient reports that she has had a decreased in lower extremity function since her last lumbar fusion she reports that she was able to use a walker to get around. It was reported by her  she had a bad reaction to a sulfa antibiotic back in March of 2020 and lost some function in her hands and feet. Ms. Juan Hair stated \"after I took Bactrim my hands became weak. She was seen in 12/10/2019 my Neurosurgery and seem to be doing fairly well post-surgery. 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 The right side seems to be greater involved than the left and was recommend that she get EMG of her lower extremity. She has been doing hand therapy and had orthosis for both wrist with no improvement. Orthovirgina requested outpatient neurology evaluate the patient due to new onset left weakness. Past Medical History:  
Diagnosis Date  Chronic pain  Chronic pain disorder 1984-current  Degenerated intervertebral disc  Melanosis of colon  Nausea & vomiting  Psychiatric disorder   
 chronic Pain  PUD (peptic ulcer disease) Past Surgical History:  
Procedure Laterality Date [address] UNLISTED  HX GYN    
 HX ORTHOPAEDIC    
 HX UROLOGICAL    
 NEUROLOGICAL PROCEDURE UNLISTED History reviewed. No pertinent family history. Social History Tobacco Use  Smoking status: Never Smoker Substance Use Topics  Alcohol use: Yes Comment: 3 glasses of wine per night. Current Facility-Administered Medications Medication Dose Route Frequency Provider Last Rate Last Dose  balsam peru-castor oiL (VENELEX) ointment   Topical TID Jamarcus Dugan MD      
 docusate sodium (COLACE) capsule 100 mg  100 mg Oral BID Lela Green MD   100 mg at 12/01/20 3267  sodium chloride (NS) flush 5-40 mL  5-40 mL IntraVENous Q8H Eliana Castillo MD   10 mL at 12/01/20 1339  
 sodium chloride (NS) flush 5-40 mL  5-40 mL IntraVENous PRN Lela Green MD      
 0.9% sodium chloride infusion  75 mL/hr IntraVENous CONTINUOUS Lela Green MD      
 acetaminophen (TYLENOL) tablet 650 mg  650 mg Oral Q4H PRN Lela Green MD      
 heparin (porcine) injection 5,000 Units  5,000 Units SubCUTAneous Ruth Cline MD   5,000 Units at 12/01/20 0945  cefepime (MAXIPIME) 2 g in 0.9% sodium chloride (MBP/ADV) 100 mL MBP  2 g IntraVENous Q8H Lela Green  mL/hr at 12/01/20 1339 2 g at 12/01/20 1339  
 metroNIDAZOLE (FLAGYL) IVPB premix 500 mg  500 mg IntraVENous Q12H Lela Green  mL/hr at 12/01/20 0441 500 mg at 12/01/20 0441  Vancomycin - pharmacy to dose   Other Rx Dosing/Monitoring Lela Green MD      
 vancomycin (VANCOCIN) 750 mg in 0.9% sodium chloride 250 mL (VIAL-MATE)  750 mg IntraVENous Q12H Lela Green MD   750 mg at 12/01/20 8974 Allergies Allergen Reactions  Fentanyl Anaphylaxis Patch  Celebrex [Celecoxib] Other (comments)  
  11/30/2020: As per [place] pharmacist  
 Duloxetine Nausea Only Other reaction(s): Nausea Only  Sulfa (Sulfonamide Antibiotics) Other (comments)  
  11/20/2020: per  Review of Systems: Musculoskeletal:negative for myalgias and arthralgias Neurological: positive for paresthesia, coordination problems, gait problems and weakness Objective:  
 
Vitals:  
 12/01/20 1200 12/01/20 1300 12/01/20 1400 12/01/20 1500 BP: 125/68 124/68 (!) 117/54 (!) 107/57 Pulse: 93 95 (!) 107 93 Resp: 16 17 22 15 Temp: 98 °F (36.7 °C) SpO2: (!) 89% 91% 92% 92% Weight:      
Height:      
  
 
Physical Exam: 
GENERAL: alert, cooperative, no distress, appears stated age LUNG: clear to auscultation bilaterally Neurologic Exam: 
Mental Status:  Alert and oriented x 4. Appropriate affect, mood and behavior. Language:    Normal fluency, repetition, comprehension and naming. Cranial Nerves:   Normal appearing fundi, sharp discs. Pupils equal, round and reactive to light. Visual fields full to confrontation. Extraocular movements intact. Facial sensation intact. Full facial strength, no asymmetry. Hearing intact bilaterally. No dysarthria. Tongue protrudes to midline, palate elevates symmetrically. Shoulder shrug 5/5 bilaterally. Motor:    Motor:                           
  RIGHT  LEFT Elbow Flexion 2-/5 3-/5 Elbow extension 3-/5 4-/5  3/5 3/5 Hip Flexion 4/5 4-/5 Hip Extension 4/5 4/5 Knee Flexion 3/5 4-/5 Knee Extension 4-/5 3/5 Foot plantar flexion 1/5 1/5 Foot dorsiflexion 1/5 1/5  
 wrist flexion 0/5 Wrist extension 1/5 Sensation:    Sensation intact throughout to light touch 
; decreased pinprick in length    dependent fashion Reflexes:    Reflexes are 2+ at the biceps, triceps, patella on the right 1+ on the left   0 /3 bilateral achilles. Foot drop, un 
 
Coordination & Gait: Normal. 
 
Recent Results (from the past 24 hour(s)) POC EG7 Collection Time: 11/30/20  6:07 PM  
Result Value Ref Range Calcium, ionized (POC) 1.24 1.12 - 1.32 mmol/L  
 pH (POC) 7.43 7.35 - 7.45    
 pCO2 (POC) 46.6 (H) 35.0 - 45.0 MMHG  
 pO2 (POC) 83 80 - 100 MMHG  
 HCO3 (POC) 30.9 (H) 22 - 26 MMOL/L Base excess (POC) 7 mmol/L  
 sO2 (POC) 96 92 - 97 % Site RIGHT RADIAL Device: NASAL CANNULA Flow rate (POC) 1 L/M Allens test (POC) YES Specimen type (POC) ARTERIAL    
TROPONIN I Collection Time: 12/01/20  4:22 AM  
Result Value Ref Range Troponin-I, Qt. <0.05 <0.05 ng/mL METABOLIC PANEL, COMPREHENSIVE Collection Time: 12/01/20  4:22 AM  
Result Value Ref Range Sodium 147 (H) 136 - 145 mmol/L Potassium 3.6 3.5 - 5.1 mmol/L Chloride 112 (H) 97 - 108 mmol/L  
 CO2 26 21 - 32 mmol/L Anion gap 9 5 - 15 mmol/L Glucose 104 (H) 65 - 100 mg/dL BUN 18 6 - 20 MG/DL Creatinine 0.38 (L) 0.55 - 1.02 MG/DL  
 BUN/Creatinine ratio 47 (H) 12 - 20 GFR est AA >60 >60 ml/min/1.73m2 GFR est non-AA >60 >60 ml/min/1.73m2 Calcium 8.2 (L) 8.5 - 10.1 MG/DL Bilirubin, total 0.5 0.2 - 1.0 MG/DL  
 ALT (SGPT) 14 12 - 78 U/L  
 AST (SGOT) 8 (L) 15 - 37 U/L Alk. phosphatase 62 45 - 117 U/L Protein, total 4.2 (L) 6.4 - 8.2 g/dL Albumin 1.9 (L) 3.5 - 5.0 g/dL Globulin 2.3 2.0 - 4.0 g/dL A-G Ratio 0.8 (L) 1.1 - 2.2    
CBC WITH AUTOMATED DIFF Collection Time: 12/01/20  4:22 AM  
Result Value Ref Range WBC 1.5 (L) 3.6 - 11.0 K/uL  
 RBC 2.15 (L) 3.80 - 5.20 M/uL HGB 7.2 (L) 11.5 - 16.0 g/dL HCT 23.9 (L) 35.0 - 47.0 % .2 (H) 80.0 - 99.0 FL  
 MCH 33.5 26.0 - 34.0 PG  
 MCHC 30.1 30.0 - 36.5 g/dL RDW 23.0 (H) 11.5 - 14.5 % PLATELET 357 171 - 898 K/uL MPV 10.3 8.9 - 12.9 FL  
 NRBC 1.3 (H) 0  WBC ABSOLUTE NRBC 0.02 (H) 0.00 - 0.01 K/uL NEUTROPHILS 20 (L) 32 - 75 % LYMPHOCYTES 33 12 - 49 % MONOCYTES 42 (H) 5 - 13 % EOSINOPHILS 0 0 - 7 % BASOPHILS 5 (H) 0 - 1 % IMMATURE GRANULOCYTES 0 % ABS. NEUTROPHILS 0.3 (L) 1.8 - 8.0 K/UL  
 ABS. LYMPHOCYTES 0.5 (L) 0.8 - 3.5 K/UL  
 ABS. MONOCYTES 0.6 0.0 - 1.0 K/UL  
 ABS. EOSINOPHILS 0.0 0.0 - 0.4 K/UL  
 ABS. BASOPHILS 0.1 0.0 - 0.1 K/UL  
 ABS. IMM. GRANS. 0.0 K/UL  
 DF MANUAL    
 RBC COMMENTS OVALOCYTES PRESENT 
    
 RBC COMMENTS ANISOCYTOSIS 2+ 
    
 RBC COMMENTS MACROCYTOSIS 2+ SAMPLES BEING HELD Collection Time: 12/01/20  4:22 AM  
Result Value Ref Range SAMPLES BEING HELD 1BLU,1RED COMMENT Add-on orders for these samples will be processed based on acceptable specimen integrity and analyte stability, which may vary by analyte. Imaging: CT Results (most recent): 
Results from Hospital Encounter encounter on 11/30/20 CT ABD PELV WO CONT Narrative EXAM: CT CHEST WO CONT, CT ABD PELV WO CONT INDICATION: neutropenic fever COMPARISON: CT ABD and pelvis 8/15/2013 and chest x-ray 11/30/2020. TECHNIQUE:  5 mm axial images were obtained through the chest, abdomen, and 
pelvis. Oral and IV contrast were not administered. Coronal and sagittal 
reconstructions were generated. CT dose reduction was achieved through use of a 
standardized protocol tailored for this examination and automatic exposure 
control for dose modulation. FINDINGS: 
 
THYROID: No nodule. MEDIASTINUM: No mass or lymphadenopathy. RENITA: No mass or lymphadenopathy. THORACIC AORTA: Atherosclerotic calcification without aneurysm. MAIN PULMONARY ARTERY: Normal in caliber. TRACHEA/BRONCHI: Patent. ESOPHAGUS: No wall thickening or dilatation. HEART: The heart is normal in size without pericardial effusion. Coronary artery 
calcifications are noted. PLEURA: Bilateral pleural effusions. No pneumothorax. LUNGS: Calcified granulomata. Dependent atelectasis overlying pleural effusions. Aerated lungs are otherwise clear. LIVER: No mass or biliary dilation. GALLBLADDER: Unremarkable. SPLEEN: Calcified granulomata. Otherwise unremarkable. PANCREAS: No mass or ductal dilation. ADRENALS: Unremarkable. KIDNEYS: Early staghorn calculus lower pole collecting system left kidney 
measures 1.8 x 1.1 cm. Right renal pelvis calculus measures 7 x 10 mm. No 
hydronephrosis, mass, or other abnormality. No ureteral dilation. STOMACH: Unremarkable. SMALL BOWEL: No dilatation or wall thickening. COLON: Noninflamed appearing sigmoid colon diverticula. Moderate rectal fecal 
retention. No dilation or wall thickening. APPENDIX: Unremarkable. PERITONEUM: No ascites or pneumoperitoneum. RETROPERITONEUM: Atherosclerotic calcination without aneurysm. No enlarged 
lymphadenopathy. REPRODUCTIVE ORGANS: Uterus is surgically absent. Right ovary is not seen and is 
likely surgically absent. Probable atrophic left ovary seen which otherwise 
appears unremarkable. URINARY BLADDER: Small nondependent intraluminal bubble of air. Otherwise 
unremarkable with no mass or calculus. BONES: Posterior too and screw spinal hardware traversing from the upper lumbar 
spine to the sacrum. No acute fracture or aggressive lesion. ADDITIONAL COMMENTS: N/A Impression IMPRESSION: 
1. Bilateral pleural effusions with overlying atelectasis. 2. Coronary artery disease 3. Nonobstructed bilateral nephrolithiasis. 4. Moderate rectal fecal retention. 5. Additional incidentals as above. Assessment:  
 
Hospital Problems  Never Reviewed Codes Class Noted POA  
 SOB (shortness of breath) ICD-10-CM: R06.02 
ICD-9-CM: 786.05  11/30/2020 Unknown Plan:  
70-year-old female with chronic bilateral upper and lower extremity weakness. 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 She has tried therapy with no improvement in strength. She is now having left-sided weakness as well which had started back in July.   We will proceed with MRI of the cervical spine looking for any cord compression. Will order neuropathy labs, due to frequent falls which had believe is related to a sensory neuropathy. She will  more than likely she will still need to follow-up with outpatient neurologist for EMG. -Neuropathy labs -MRI of the C-spine - Outpatient EMG Thank you for allowing the Neurology Service the pleasure of participating in the care of your patient. This patient will be discussed with my collaborating care team physician  and he may have further recommendations regarding this patient's care. Signed By: Laila Mercado NP December 1, 2020

## 2020-12-01 NOTE — ACP (ADVANCE CARE PLANNING)
Advance Care Planning Advance Care Planning Activator (Inpatient) Conversation Note Date of ACP Conversation: 12/01/20 Conversation Conducted with:   Patient with Decision Making Capacity ACP Activator: Mark Millan RN 
 
*When Decision Maker makes decisions on behalf of the incapacitated patient: Decision Maker is asked to consider and make decisions based on patient values, known preferences, or best interests. Health Care Decision Maker: 
 
Current Designated Health Care Decision Maker:   Primary Decision Maker: [id number] [facility]At[facility] [phone] (If there is a valid [place] named in the [facility] Makers\" box in the ACP activity, but it is not visible above, be sure to open that field and then select the health care decision maker relationship (ie \"primary\") in the blank space to the right of the name.) Validate  this information as still accurate & up-to-date; edit [place] field as needed.) Note: Assess and validate information in current ACP documents, as indicated. If no Decision Maker listed above or available through scanned documents, then: 
 
If no Authorized Decision Maker has previously been identified, then patient chooses [place]: \"Who would you like to name as your primary health care decision-maker? \" Name: Mike Estrada   Relationship: spouse Phone number: [phone] \"Can this person be reached easily? \" Sanjuana Lua Care Preferences Ventilation: \"If you were in your present state of health and suddenly became very ill and were unable to breathe on your own, what would your preference be about the use of a ventilator (breathing machine) if it were available to you? \" If patient would desire the use of a ventilator (breathing machine), answer \"yes\", if not \"no\":yes \"If your health worsens and it becomes clear that your chance of recovery is unlikely, what would your preference be about the use of a ventilator (breathing machine) if it were available to you? \" Would the patient desire the use of a ventilator (breathing machine)? YES Resuscitation \"CPR works best to restart the heart when there is a sudden event, like a heart attack, in someone who is otherwise healthy. Unfortunately, CPR does not typically restart the heart for people who have serious health conditions or who are very sick. \" \"In the event your heart stopped as a result of an underlying serious health condition, would you want attempts to be made to restart your heart (answer \"yes\" for attempt to resuscitate) or would you prefer a natural death (answer \"no\" for do not attempt to resuscitate)? \" Sanjuana Lua [x] Yes  [] No   Educated Patient / Sary Castellanos regarding differences between Advance Directives and portable DNR orders. Length of ACP Conversation in minutes:  8 with  Conversation Outcomes: 
[x] ACP discussion completed 
[] Existing advance directive reviewed with patient; no changes to patient's previously recorded wishes 
 
 [] New Advance Directive completed 
 [] Portable Do Not Resuscitate prepared for Provider review and signature 
[] POLST/POST/MOLST/MOST prepared for Provider review and signature Follow-up plan:   
[] Schedule follow-up conversation to continue planning 
[] Referred individual to Provider for additional questions/concerns  
[] Advised patient/agent/surrogate to review completed ACP document and update if needed with changes in condition, patient preferences or care setting  
 
[] This note routed to one or more involved healthcare providers

## 2020-12-01 NOTE — CONSULTS
Patient seen, chart reviewed, note dictated. [id number] 
 
71 y/o woman with myelodysplastic syndrome (MDS-MLD-RS, diagnosed 9/2020 BM biopsy, normal cytogenetics/FISH, no increase in blasts) now admitted with confusion, hypoxia, recently diagnosed UTI and neutropenic fever. CT C/A/P showed bilateral pleural effusions with associated atelectasis. Started on vancomycin/cefepime/flagyl, seen by ID. CT head normal.  
 
1. Neutropenic fever: She has myelodysplastic syndrome as the cause of her cytopenias. She is receiving luspatercept in our office which is a monoclonal antibody that acts to stimulate normoblasts. Her neutropenia preceded this treatment and luspatercept should not cause neutropenia. Agree with broad spectrum Abx. Historically, I suspect she is having neutropenic sepsis from a UTI. I would recommend against GCSF to avoid stimulating a leukemic clone. On maintenance nitrofurantoin. 2. Confusion: Case d/w , Mildred Whtie. He feels she is less confused and looks better compared to yesterday. 3. Bladder outlet obstruction: followed at [facility]. Lakisha Eddy MD 
Hem/Onc 
[phone]

## 2020-12-02 ENCOUNTER — APPOINTMENT (OUTPATIENT)
Dept: MRI IMAGING | Age: 75
DRG: 689 | End: 2020-12-02
Attending: NURSE PRACTITIONER
Payer: MEDICARE

## 2020-12-02 PROBLEM — R50.81 NEUTROPENIC FEVER (HCC): Status: ACTIVE | Noted: 2020-12-02

## 2020-12-02 PROBLEM — G93.41 ACUTE METABOLIC ENCEPHALOPATHY: Status: ACTIVE | Noted: 2020-12-02

## 2020-12-02 PROBLEM — D64.9 CHRONIC ANEMIA: Status: ACTIVE | Noted: 2020-12-02

## 2020-12-02 PROBLEM — R41.0 DISORIENTATION: Status: ACTIVE | Noted: 2020-12-02

## 2020-12-02 PROBLEM — D70.9 NEUTROPENIC FEVER (HCC): Status: ACTIVE | Noted: 2020-12-02

## 2020-12-02 PROBLEM — D46.9 MDS (MYELODYSPLASTIC SYNDROME) (HCC): Status: ACTIVE | Noted: 2020-12-02
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
## 2020-12-02 PROBLEM — A41.9 SEVERE SEPSIS (HCC): Status: ACTIVE | Noted: 2020-12-02

## 2020-12-02 PROBLEM — J18.9 PNA (PNEUMONIA): Status: ACTIVE | Noted: 2020-12-02

## 2020-12-02 PROBLEM — R65.20 SEVERE SEPSIS (HCC): Status: ACTIVE | Noted: 2020-12-02

## 2020-12-02 PROBLEM — N39.0 CHRONIC UTI (URINARY TRACT INFECTION): Status: ACTIVE | Noted: 2020-12-02

## 2020-12-02 LAB
ALBUMIN SERPL-MCNC: 1.8 G/DL (ref 3.5–5)
ALBUMIN/GLOB SERPL: 0.6 {RATIO} (ref 1.1–2.2)
ALP SERPL-CCNC: 62 U/L (ref 45–117)
ALT SERPL-CCNC: 12 U/L (ref 12–78)
ANION GAP SERPL CALC-SCNC: 9 MMOL/L (ref 5–15)
AST SERPL-CCNC: 8 U/L (ref 15–37)
BACTERIA SPEC CULT: NORMAL
BACTERIA SPEC CULT: NORMAL
BASOPHILS # BLD: 0 K/UL (ref 0–0.1)
BASOPHILS NFR BLD: 2 % (ref 0–1)
BILIRUB SERPL-MCNC: 0.4 MG/DL (ref 0.2–1)
BUN SERPL-MCNC: 21 MG/DL (ref 6–20)
BUN/CREAT SERPL: 49 (ref 12–20)
CALCIUM SERPL-MCNC: 8.7 MG/DL (ref 8.5–10.1)
CHLORIDE SERPL-SCNC: 113 MMOL/L (ref 97–108)
CO2 SERPL-SCNC: 25 MMOL/L (ref 21–32)
CREAT SERPL-MCNC: 0.43 MG/DL (ref 0.55–1.02)
DIFFERENTIAL METHOD BLD: ABNORMAL
EOSINOPHIL # BLD: 0 K/UL (ref 0–0.4)
EOSINOPHIL NFR BLD: 0 % (ref 0–7)
ERYTHROCYTE [DISTWIDTH] IN BLOOD BY AUTOMATED COUNT: 22.6 % (ref 11.5–14.5)
GLOBULIN SER CALC-MCNC: 3 G/DL (ref 2–4)
GLUCOSE SERPL-MCNC: 94 MG/DL (ref 65–100)
HCT VFR BLD AUTO: 27.2 % (ref 35–47)
HGB BLD-MCNC: 8.5 G/DL (ref 11.5–16)
IMM GRANULOCYTES # BLD AUTO: 0 K/UL
IMM GRANULOCYTES NFR BLD AUTO: 0 %
LYMPHOCYTES # BLD: 0.5 K/UL (ref 0.8–3.5)
LYMPHOCYTES NFR BLD: 39 % (ref 12–49)
MCH RBC QN AUTO: 34.6 PG (ref 26–34)
MCHC RBC AUTO-ENTMCNC: 31.3 G/DL (ref 30–36.5)
MCV RBC AUTO: 110.6 FL (ref 80–99)
MONOCYTES # BLD: 0.6 K/UL (ref 0–1)
MONOCYTES NFR BLD: 43 % (ref 5–13)
NEUTS BAND NFR BLD MANUAL: 2 % (ref 0–6)
NEUTS SEG # BLD: 0.2 K/UL (ref 1.8–8)
NEUTS SEG NFR BLD: 14 % (ref 32–75)
NRBC # BLD: 0.02 K/UL (ref 0–0.01)
NRBC BLD-RTO: 1.6 PER 100 WBC
PLATELET # BLD AUTO: 242 K/UL (ref 150–400)
PMV BLD AUTO: 10.8 FL (ref 8.9–12.9)
POTASSIUM SERPL-SCNC: 3.2 MMOL/L (ref 3.5–5.1)
PROT SERPL-MCNC: 4.8 G/DL (ref 6.4–8.2)
RBC # BLD AUTO: 2.46 M/UL (ref 3.8–5.2)
RBC MORPH BLD: ABNORMAL
RBC MORPH BLD: ABNORMAL
SERVICE CMNT-IMP: NORMAL
SODIUM SERPL-SCNC: 147 MMOL/L (ref 136–145)
WBC # BLD AUTO: 1.3 K/UL (ref 3.6–11)
WBC MORPH BLD: ABNORMAL

## 2020-12-02 PROCEDURE — 65660000000 HC RM CCU STEPDOWN

## 2020-12-02 PROCEDURE — 85025 COMPLETE CBC W/AUTO DIFF WBC: CPT

## 2020-12-02 PROCEDURE — 77030038269 HC DRN EXT URIN PURWCK BARD -A

## 2020-12-02 PROCEDURE — 74011250637 HC RX REV CODE- 250/637: Performed by: FAMILY MEDICINE

## 2020-12-02 PROCEDURE — 36415 COLL VENOUS BLD VENIPUNCTURE: CPT

## 2020-12-02 PROCEDURE — 77010033678 HC OXYGEN DAILY

## 2020-12-02 PROCEDURE — 97530 THERAPEUTIC ACTIVITIES: CPT

## 2020-12-02 PROCEDURE — 80053 COMPREHEN METABOLIC PANEL: CPT

## 2020-12-02 PROCEDURE — 74011250636 HC RX REV CODE- 250/636: Performed by: FAMILY MEDICINE

## 2020-12-02 PROCEDURE — 97162 PT EVAL MOD COMPLEX 30 MIN: CPT

## 2020-12-02 PROCEDURE — 74011250637 HC RX REV CODE- 250/637: Performed by: HOSPITALIST

## 2020-12-02 PROCEDURE — 74011250636 HC RX REV CODE- 250/636: Performed by: NURSE PRACTITIONER

## 2020-12-02 PROCEDURE — 74011000258 HC RX REV CODE- 258: Performed by: FAMILY MEDICINE

## 2020-12-02 PROCEDURE — 74011250637 HC RX REV CODE- 250/637: Performed by: NURSE PRACTITIONER

## 2020-12-02 PROCEDURE — 99232 SBSQ HOSP IP/OBS MODERATE 35: CPT | Performed by: INTERNAL MEDICINE

## 2020-12-02 RX ORDER — LORAZEPAM 2 MG/ML
0.5 INJECTION INTRAMUSCULAR ONCE
Status: COMPLETED | OUTPATIENT
Start: 2020-12-02 | End: 2020-12-02

## 2020-12-02 RX ORDER — PHENAZOPYRIDINE HYDROCHLORIDE 100 MG/1
100 TABLET, FILM COATED ORAL
Status: DISCONTINUED | OUTPATIENT
Start: 2020-12-02 | End: 2020-12-11 | Stop reason: HOSPADM

## 2020-12-02 RX ORDER — FAMOTIDINE 20 MG/1
20 TABLET, FILM COATED ORAL DAILY
Status: DISCONTINUED | OUTPATIENT
Start: 2020-12-02 | End: 2020-12-03

## 2020-12-02 RX ORDER — POTASSIUM CHLORIDE 750 MG/1
20 TABLET, FILM COATED, EXTENDED RELEASE ORAL 2 TIMES DAILY
Status: COMPLETED | OUTPATIENT
Start: 2020-12-02 | End: 2020-12-03

## 2020-12-02 RX ADMIN — POTASSIUM CHLORIDE 20 MEQ: 750 TABLET, FILM COATED, EXTENDED RELEASE ORAL at 17:01

## 2020-12-02 RX ADMIN — LORAZEPAM 0.5 MG: 2 INJECTION INTRAMUSCULAR; INTRAVENOUS at 00:30

## 2020-12-02 RX ADMIN — HEPARIN SODIUM 5000 UNITS: 5000 INJECTION INTRAVENOUS; SUBCUTANEOUS at 16:58

## 2020-12-02 RX ADMIN — DOCUSATE SODIUM 100 MG: 100 CAPSULE, LIQUID FILLED ORAL at 17:01

## 2020-12-02 RX ADMIN — Medication: at 16:58

## 2020-12-02 RX ADMIN — PREGABALIN 150 MG: 75 CAPSULE ORAL at 21:05

## 2020-12-02 RX ADMIN — ACETAMINOPHEN 650 MG: 325 TABLET ORAL at 18:58

## 2020-12-02 RX ADMIN — DOCUSATE SODIUM 100 MG: 100 CAPSULE, LIQUID FILLED ORAL at 10:13

## 2020-12-02 RX ADMIN — Medication: at 10:09

## 2020-12-02 RX ADMIN — Medication: at 21:07

## 2020-12-02 RX ADMIN — VANCOMYCIN HYDROCHLORIDE 750 MG: 750 INJECTION, POWDER, LYOPHILIZED, FOR SOLUTION INTRAVENOUS at 05:41

## 2020-12-02 RX ADMIN — METRONIDAZOLE 500 MG: 500 INJECTION, SOLUTION INTRAVENOUS at 05:42

## 2020-12-02 RX ADMIN — POTASSIUM CHLORIDE 20 MEQ: 750 TABLET, FILM COATED, EXTENDED RELEASE ORAL at 10:05

## 2020-12-02 RX ADMIN — CEFEPIME HYDROCHLORIDE 2 G: 2 INJECTION, POWDER, FOR SOLUTION INTRAVENOUS at 18:46

## 2020-12-02 RX ADMIN — CEFEPIME HYDROCHLORIDE 2 G: 2 INJECTION, POWDER, FOR SOLUTION INTRAVENOUS at 03:54

## 2020-12-02 RX ADMIN — Medication 10 ML: at 21:06

## 2020-12-02 RX ADMIN — Medication 10 ML: at 05:43

## 2020-12-02 RX ADMIN — HEPARIN SODIUM 5000 UNITS: 5000 INJECTION INTRAVENOUS; SUBCUTANEOUS at 23:45

## 2020-12-02 RX ADMIN — FAMOTIDINE 20 MG: 20 TABLET, FILM COATED ORAL at 10:05

## 2020-12-02 RX ADMIN — CEFEPIME HYDROCHLORIDE 2 G: 2 INJECTION, POWDER, FOR SOLUTION INTRAVENOUS at 10:13

## 2020-12-02 RX ADMIN — HEPARIN SODIUM 5000 UNITS: 5000 INJECTION INTRAVENOUS; SUBCUTANEOUS at 10:05

## 2020-12-02 NOTE — CONSULTS
[address] Name:  Donya Barrett 
MR#:  [id number] :  1945 ACCOUNT #:  [de-identified] DATE OF SERVICE:  2020 HEMATOLOGY/ONCOLOGY CONSULTATION NOTE 
 
REASON FOR ADMISSION:  Confusion. REASON FOR CONSULTATION:  Neutropenia. HISTORY OF PRESENT ILLNESS:  The patient is a very pleasant 73-year-old woman. She is followed closely by my partner, Dr. Kendy Valerio. She was diagnosed in 2020 with myelodysplastic syndrome, multilineage dysplasia with ring sideroblasts. She has been treated in our office both with Procrit and more recently, she was started on luspatercept on . She has a problem with chronic bladder outlet obstruction and is followed by [facility]. Recently, she went to see them to discuss her case. Unfortunately, there was a scheduling mishap and although she was there, the physician to see her was not there. She has fell in the parking lot and since then has had some problems with confusion. She is taking nitrofurantoin for prophylaxis for UTI, but there was some concern that she had urinary tract infection. An antibiotic was called in from the on-call physicians by [facility], but the patient opted not to fill it. She presented to this ER on  around 1 p.m. with her , Dr. Rafi Gage, a psychologist at the [facility] with confusion. On admission, she was noted to have neutropenia with total white count of 1.2, hemoglobin 8.5, platelet count 486, 18% neutrophils. For this reason, she was admitted for further evaluation and treatment. She was started on broad-spectrum antibiotics with vancomycin, cefepime, and Flagyl. She had a small abrasion in the anterior aspect of her left shin. She had blood cultures and urine cultures done. No growth today. She has been seen by the Infectious Disease Service who recommended continued antibiotics.   She had CT scan of the head done which was normal as well as CT scan of the chest and abdomen. She was noted to have bilateral pleural effusions with compressive atelectasis. Urinalysis was mildly abnormal with trace blood, 4+ bacteria, and 0-4 wbc's. No nitrites. No leukocytes esterase. Currently, she is awake and alert but somewhat confused. She denies any fever. PAST MEDICAL HISTORY: 
1. Nephrolithiasis. 2.  Bladder outlet obstruction. 3.  Peptic ulcer disease. 4.  Arthritis. 5.  Osteoporosis. 6.  Pernicious anemia. 7.  Previous spinal surgery, one in 2012, the other 2019. 
8.  Hysterectomy, age 42. CURRENT MEDICATIONS IN THE HOSPITAL: 
1. Cefepime 2 g IV q.8. 2.  Colace 100 mg twice daily. 3.  Heparin 5000 units subcutaneous q.8. 4.  Flagyl 500 mg IV q.12. 
5.  Vancomycin, pharmacy to dose. ALLERGIES:  FENTANYL, CELEBREX, DULOXETINE, SULFA. SOCIAL HISTORY:  She is . FAMILY HISTORY:  She lost her 70-year-old son to leukemia decades ago. REVIEW OF SYSTEMS: 
GENERAL:  Fever as above. HEENT:  No auditory or visual change. LYMPHATIC:  No lumps or bumps in neck, underarm, or groin. CARDIOVASCULAR:  No chest pain or palpitations. PULMONARY:  No cough or shortness of breath. GASTROINTESTINAL:  No abdominal pain, diarrhea, or constipation. GENITOURINARY:  No dysuria or incontinence. SKIN:  No rash or changing mole. MUSCULOSKELETAL:  No red or swollen joints. NEUROLOGIC:  No irritability or syncope. PHYSICAL EXAMINATION: 
GENERAL:  Awake and alert, no acute distress. VITAL SIGNS:  /57, pulse 93, satting 92% on room air. HEENT:  Sclerae anicteric. Oropharynx clear. NECK:  Supple without lymphadenopathy or thyromegaly. LUNGS:  Clear to auscultation bilaterally. HEART:  Regular rate and rhythm without murmur, rub, or gallop. ABDOMEN:  Nontender and nondistended. Normoactive bowel sounds. No hepatosplenomegaly. EXTREMITIES:  She has an abrasion of the anterior aspect of her left shin, 2+ pitting edema bilaterally. ASSESSMENT AND PLAN:  A 80-year-old woman with myelodysplastic syndrome (MDS), multilineage dysplasia, ring sideroblasts diagnosed 09/2020 by bone marrow biopsy. Normal cytogenetics. Normal fluorescence in situ hybridization. No increase in blasts (now admitted with confusion, hypoxia, recently diagnosed urinary tract infection, neutropenic fever). CT scan of the chest, abdomen, and pelvis shows bilateral pleural effusions and associated atelectasis, started on vancomycin, cefepime, and Flagyl, seen by Infectious Disease. CT head normal. 
1.  Neutropenic fever:  Her  explains she is much improved today after hydration and antibiotics. Myelodysplastic syndrome is the cause of her cytopenias. This is not an easily reversible problem. She is receiving luspatercept in her office which is a monoclonal antibody that actually stimulate normoblasts to make more red blood cells. Her neutropenia preceded this treatment, and luspatercept should not cause neutropenia. I agree with broad-spectrum antibiotics. Historically, I suspect she is having neutropenic sepsis from urinary tract infection. I would recommend against granulocyte colony-stimulating factor to avoid stimulating leukemic clone. She was on maintenance nitrofurantoin prior to this episode. 2.  Confusion:  Case discussed with , Dr. Virgniia Verduzco. He feels this is improving compared to yesterday. 3.  Bladder outlet obstruction, followed by [address] Elan Louis MD 
 
 
[id number] 
D:  12/01/2020 16:58 
T:  12/01/2020 21:54 
JOB #:  [id number] CC:  Randi Cash MD

## 2020-12-02 NOTE — PROGRESS NOTES
Hematology-Oncology Progress Note [address] [facility] 1945 
[id number] 
12/2/2020 Subjective:  
 
Still with confusion. She thinks she is at a school. No new complaints. Afebrile overnight. Allergies: Fentanyl; Celebrex [celecoxib]; Duloxetine; and Sulfa (sulfonamide antibiotics) Current Facility-Administered Medications Medication Dose Route Frequency Provider Last Rate Last Dose  balsam peru-castor oiL (VENELEX) ointment   Topical TID Mariza Abdalla MD      
 [START ON 12/3/2020] epoetin annie-epbx (RETACRIT) injection 20,000 Units  20,000 Units SubCUTAneous Q7D Chelsi Chan MD      
 pregabalin (LYRICA) capsule 150 mg  150 mg Oral QHS Mariza Abdalla MD   150 mg at 12/01/20 2145  oxyCODONE IR (ROXICODONE) tablet 5 mg  5 mg Oral Q4H PRN Fernando Stahl NP   5 mg at 12/01/20 2145  docusate sodium (COLACE) capsule 100 mg  100 mg Oral BID Malathi Haynes MD   100 mg at 12/01/20 1819  
 sodium chloride (NS) flush 5-40 mL  5-40 mL IntraVENous Q8H Malathi Haynes MD   10 mL at 12/02/20 0543  sodium chloride (NS) flush 5-40 mL  5-40 mL IntraVENous PRN Malathi Haynes MD      
 0.9% sodium chloride infusion  75 mL/hr IntraVENous CONTINUOUS Malathi Haynes MD 75 mL/hr at 12/01/20 2355 75 mL/hr at 12/01/20 2355  acetaminophen (TYLENOL) tablet 650 mg  650 mg Oral Q4H PRN Malathi Haynes MD   650 mg at 12/01/20 1907  heparin (porcine) injection 5,000 Units  5,000 Units SubCUTAneous Q8H Malathi Haynes MD   5,000 Units at 12/01/20 2355  cefepime (MAXIPIME) 2 g in 0.9% sodium chloride (MBP/ADV) 100 mL MBP  2 g IntraVENous Q8H Malathi Haynes  mL/hr at 12/02/20 0354 2 g at 12/02/20 0354  metroNIDAZOLE (FLAGYL) IVPB premix 500 mg  500 mg IntraVENous Q12H Malathi Haynes  mL/hr at 12/02/20 0542 500 mg at 12/02/20 0542  Vancomycin - pharmacy to dose   Other Rx Dosing/Monitoring Malathi Haynes MD      
  vancomycin (VANCOCIN) 750 mg in 0.9% sodium chloride 250 mL (VIAL-MATE)  750 mg IntraVENous Q12H Angelica Thornton MD   750 mg at 12/02/20 0541 Objective:  
 
Patient Vitals for the past 24 hrs: 
 BP Temp Pulse Resp SpO2 Weight  
12/02/20 0348 (!) 106/52 98.1 °F (36.7 °C) 94 20 98 % 63.5 kg (140 lb) 12/02/20 0000   97     
12/01/20 2321 119/64 99.3 °F (37.4 °C) 97 18 97 %   
12/01/20 2032 (!) 114/53 99 °F (37.2 °C) (!) 103 20 95 %   
12/01/20 2000   94     
12/01/20 1848 129/72 98 °F (36.7 °C) 96 20 97 %   
12/01/20 1700 121/65 98.4 °F (36.9 °C) 97 20 93 %   
12/01/20 1500 (!) 107/57  93 15 92 %   
12/01/20 1400 (!) 117/54  (!) 107 22 92 %   
12/01/20 1300 124/68  95 17 91 %   
12/01/20 1200 125/68 98 °F (36.7 °C) 93 16 (!) 89 %   
12/01/20 1100 121/63  85 13 91 %   
12/01/20 1000 (!) 111/53  92 13 90 %   
12/01/20 0900 95/63  97 16 98 %   
12/01/20 0806 (!) 131/59 98.1 °F (36.7 °C) 80 14 98 %  Gen: NAD HEENT: PERRL, Sclerae anicteric Cv: RRR without m/r/g Pulm: CTA bilaterally Abd: NABS, NTND, No HSM Ext: No c/c/e Available labs reviewed: 
Labs:   
Recent Results (from the past 24 hour(s)) CULTURE, MRSA Collection Time: 12/01/20  2:27 PM  
 Specimen: Nares; Nasal  
Result Value Ref Range Special Requests: NO SPECIAL REQUESTS Culture result: MRSA NOT PRESENT AT 16 HOURS    
SED RATE (ESR) Collection Time: 12/01/20  6:24 PM  
Result Value Ref Range Sed rate, automated 61 (H) 0 - 30 mm/hr VITAMIN B12 Collection Time: 12/01/20  6:24 PM  
Result Value Ref Range Vitamin B12 >2,000 (H) 193 - 986 pg/mL TSH 3RD GENERATION Collection Time: 12/01/20  6:24 PM  
Result Value Ref Range TSH 1.64 0.36 - 3.74 uIU/mL CBC WITH AUTOMATED DIFF Collection Time: 12/02/20  4:15 AM  
Result Value Ref Range WBC 1.3 (L) 3.6 - 11.0 K/uL  
 RBC 2.46 (L) 3.80 - 5.20 M/uL HGB 8.5 (L) 11.5 - 16.0 g/dL HCT 27.2 (L) 35.0 - 47.0 % .6 (H) 80.0 - 99.0 FL  
 MCH 34.6 (H) 26.0 - 34.0 PG  
 MCHC 31.3 30.0 - 36.5 g/dL RDW 22.6 (H) 11.5 - 14.5 % PLATELET 955 807 - 120 K/uL MPV 10.8 8.9 - 12.9 FL  
 NRBC 1.6 (H) 0  WBC ABSOLUTE NRBC 0.02 (H) 0.00 - 0.01 K/uL NEUTROPHILS PENDING % LYMPHOCYTES PENDING % MONOCYTES PENDING % EOSINOPHILS PENDING % BASOPHILS PENDING % IMMATURE GRANULOCYTES PENDING %  
 ABS. NEUTROPHILS PENDING K/UL  
 ABS. LYMPHOCYTES PENDING K/UL  
 ABS. MONOCYTES PENDING K/UL  
 ABS. EOSINOPHILS PENDING K/UL  
 ABS. BASOPHILS PENDING K/UL  
 ABS. IMM. GRANS. PENDING K/UL  
 DF PENDING   
METABOLIC PANEL, COMPREHENSIVE Collection Time: 12/02/20  4:15 AM  
Result Value Ref Range Sodium 147 (H) 136 - 145 mmol/L Potassium 3.2 (L) 3.5 - 5.1 mmol/L Chloride 113 (H) 97 - 108 mmol/L  
 CO2 25 21 - 32 mmol/L Anion gap 9 5 - 15 mmol/L Glucose 94 65 - 100 mg/dL BUN 21 (H) 6 - 20 MG/DL Creatinine 0.43 (L) 0.55 - 1.02 MG/DL  
 BUN/Creatinine ratio 49 (H) 12 - 20 GFR est AA >60 >60 ml/min/1.73m2 GFR est non-AA >60 >60 ml/min/1.73m2 Calcium 8.7 8.5 - 10.1 MG/DL Bilirubin, total 0.4 0.2 - 1.0 MG/DL  
 ALT (SGPT) 12 12 - 78 U/L  
 AST (SGOT) 8 (L) 15 - 37 U/L Alk. phosphatase 62 45 - 117 U/L Protein, total 4.8 (L) 6.4 - 8.2 g/dL Albumin 1.8 (L) 3.5 - 5.0 g/dL Globulin 3.0 2.0 - 4.0 g/dL A-G Ratio 0.6 (L) 1.1 - 2.2 Assessment and Plan  
 
69 y/o woman with MDS (MLD-RS, low risk) on outpatient procrit/luspatercept, now admitted with questionable UTI and confusion. 1. Confusion: seen by Neuro, MRI brain ordered and pending. CT normal. I suspect this is related to an occult infection. Seen by ID, on broad spectrum Abx.  
 
2. MDS: There is not easily solution for her neutropenia. This is of longstanding duration. She will receive her weekly procrit/retacrit tomorrow. Luspatercept as an outpatient. No indication for transfusion. 3. Bladder outlet obsruction: seen by the Urology group at [facility]. Thank you for allowing us to participate in the care of this very pleasant patient. Margi Urbano MD 
Hematology/Oncology Phone [phone]

## 2020-12-02 NOTE — CONSULTS
Requesting Provider: Mihir Acevedo MD - Reason for Consultation: \"UTI and hx of renal stones\"  Pre-existing [facility] Patient:   Yes                Patient: Jalil Simmons MRN: [id number]  SSN: [id number]    YOB: 1945  Age: 70 y.o. Sex: female     Location: [facility][note 3]       Code Status: Full Code   PCP: Navid Harris MD  - [phone]   Emergency Contact:  Primary Emergency Contact: Connie Upton, Home Phone: [phone]   Race/Anabaptism/Language: [facility] / [facility] / Sergio Paz   Payor: Payor: Claude Bond / Plan: Harini Sanchez / Product Type: Medicare /    Prior Admission Data: 7/21/15 [facility] ENDOSCOPY Emil Mckeon   Hospitalized:  Hospital Day: 3 - Admitted 11/30/2020 11:09 AM     CONSULTANTS  IP CONSULT TO HEMATOLOGY  IP CONSULT TO ONCOLOGY  IP CONSULT TO INFECTIOUS DISEASES  IP CONSULT TO NEUROLOGY  IP CONSULT TO UROLOGY   ADMISSION DIAGNOSES    ICD-10-CM ICD-9-CM   1. Granulocytopenia ([facility] [address]  D70.9 288.00   2. Pneumonia of left lower lobe due to infectious organism  J18.9 486   3. Fever, unspecified fever cause  R50.9 780.60         Assessment/Plan:        1.8 x 1.1 cm Left Lower Pole Staghorn Calculus  Right renal pelvis calculus measures 7 x 10 mm.  +Gram Negative Tao UTI  Hx of incomplete bladder emptying     - Continue broad spectrum antibiotics, follow Urine Culture and treat with culture sensitive antibiotics. - Hx of incomplete bladder emptying. Bladder scan x1 following next void and document findings in chart. Insert Jacobson for PVR >350 cc. H/o axonal polyneuropathy, lumbar spine surgery, she may be a good candidate for a Urodynamics study outpatient.   - Start pyridium PRN dysuria.   - Once medically stable, recommend definitive stone treatment as it is likely the cause of her recurrent UTIs. Previously discussed possible PCNL for her left sided stone and URS for right sided stone. Had a long discussion with the patient and her spouse.  She and her  would like to discuss treatment options and whether to proceed with [facility] or [facility]. - Will continue to follow. Supervising MD, Dr. Lawrence Speaks. CC: Fever   HPI: She is a 70 y.o. female with PMH of Nephrolithiasis, Bladder Obstruction: frequent UTI's- on maintenance nitrofurantoin, PUD, osteoarthritis, Radial Nerve Palsy since 4/20- followed by Neuro, Penicious Anemia, Hx Spinal Sx. She presented to the ED with AMS on 11/30/20. Patient has been having recurrent UTI's, followed by [facility], last visit x5 days ago was not able to see the urologist, was placed on antibiotics. The  reports that since then she had progressively worsened, experiencing trouble walking, more confused and disoriented, he reports the patient fell down 4 days back and bruised her shins and has been complaining of some pain in both her shins and she spiked a fever, confusion worsened. Urology Consulted for recurrent UTIs and hx of renal stones with urinary obstruction. She is a known patient of [facility]. Seen by Dr. Nicole Marshall on 12/17/19, see last OV note below. Hx of recurrent UTIs, left lower pole 23 mm staghorn calculus, and right renal pelic calculus measuring 13 mm. She declined stone tx at that time because she was recovering from back surgery. Planned to RTO to discuss possible PCNL for her left sided stone and URS for right sided stone; however, she did not follow-up. CT reviewed demonstrating Early staghorn calculus lower pole collecting system left kidney measuring 1.8 x 1.1 cm. Right renal pelvis calculus measures 7 x 10 mm. No Hydro or obstruction. Per Hematology: MDS: There is not easily solution for her neutropenia. This is of longstanding duration. She will receive her weekly procrit/retacrit tomorrow. Luspatercept as an outpatient. No indication for transfusion. Patient oriented in bed. Answering questions appropriately. Spouse reports intermittent confusion throughout the day.  She reports dysuria and hx of incomplete bladder emptying. Currently has an external female catheter in place with dark yellow UA. Will obtain a bladder scan and start pyridium PRN. Denies suprapubic pain, flank pain. No CVA tenderness. Her spouse reports that she had a terrible reaction to Bactrim in 2020, causing low blood counts,  has been seeing [facility] for management. Had a long discussion with patient about definitive stone treatment after resolution of her UTI and is medically stable. She and her  would like to discuss treatment options and whether to proceed with [facility] or [facility]. AFVSS  WBC 1.3, Heme/Onc following   Hgb 8.5  Cr 0.43  UA 0-4 WBCs, 0-5 RBCs, 4+ bacteria  UCx + Gram negative Rods  BCx NGTD  +Maxipime, flagyl, vanc - ID following   Covid -  MRI pending       CT A/P wo 20:  KIDNEYS: Early staghorn calculus lower pole collecting system left kidney  measures 1.8 x 1.1 cm. Right renal pelvis calculus measures 7 x 10 mm. No  hydronephrosis, mass, or other abnormality. No ureteral dilation. URINARY BLADDER: Small nondependent intraluminal bubble of air. Otherwise  unremarkable with no mass or calculus. IMPRESSION:  1. Bilateral pleural effusions with overlying atelectasis. 2. Coronary artery disease  3. Nonobstructed bilateral nephrolithiasis. 4. Moderate rectal fecal retention. 5. Additional incidentals as above. Problem: recurrent UTI, renal calculi; Location: b/l kidneys; Quality: Early staghorn calculus lower pole collecting system left kidney  measures 1.8 x 1.1 cm. Right renal pelvis calculus measures 7 x 10 mm., Severity: nonobstructing; Timin, Context: see above in HPI; Better/Worse: decreased in size, Associated s/s: confusion      =============Last OV Note Below==================  Ms. Niall Hoffman is a 68 y/o woman with a hx of a staghorn calculus, renal calculus and recurrent UTIs.  She was seen about a month ago c/o a significant infection, and was provided an rx for doxycycline. This resolved her sx, however sx have since returned. UA today has 6-10 WBC, 1-2 RBC, and TNTC Bacteria. She is interested in stone removal procedure, however, she recently underwent back surgery for a thoracic compression fracture. She is in the middle of rehab for her gait and would like to wait until after she recovers to proceed with any stone surgery. CT today demonstrates the following; Left lower pole staghorn calculus measuring up to 23 mm and right renal pelvis calculus measuring 13 mm. Small amount of air within the right collecting system and within the bladder. Extensive degenerative and postsurgical changes of the spine, partially imaged. Severe osteopenia as well as artifact related to spinal hardware limit evaluation. Complaint; Staghorn stones  Onset, years  Context, bilateral stones  Location,  bilateral  Timing, constant  Severity, severe  Modifiers, none  Associated, UTI    PAST MEDICAL HISTORY:    Allergies: EFFEXOR (Critical)  * FENTANYL (Critical)  * DULOXETINE (Critical)  * SSR3 (Critical)  CIPRO (Severe)  DENIES: Latex, Shellfish, X-Ray Dye, Iodine. Medications: DOXYCYCLINE MONOHYDRATE 100 MG ORAL CAPSULE (DOXYCYCLINE MONOHYDRATE) one bid w/ food; Route: ORAL  * VITAMINS   PREGABALIN 75 MG ORAL CAPSULE (PREGABALIN) ; Route: ORAL  MIRALAX ORAL PACKET (POLYETHYLENE GLYCOL 3350) ; Route: ORAL  HYDRALAZINE HCL 25 MG ORAL TABLET (HYDRALAZINE HCL) ; Route: ORAL  GUAIFENESIN  MG ORAL TABLET EXTENDED RELEASE 12 HOUR (GUAIFENESIN) ; Route: ORAL  GNP FLUTICASONE PROPIONATE 50 MCG/ACT NASAL SUSPENSION (FLUTICASONE PROPIONATE) ; Route: NASAL  DICLOFENAC SODIUM 1 % TRANSDERMAL GEL (DICLOFENAC SODIUM) ; Route: TRANSDERMAL  CULTURELLE ORAL CAPSULE (LACTOBACILLUS RHAMNOSUS (GG)) ; Route: ORAL  BUPRENORPHINE HCL 2 MG SUBLINGUAL TABLET SUBLINGUAL (BUPRENORPHINE HCL) ; Route: SUBLINGUAL  BETHANECHOL CHLORIDE 10 MG ORAL TABLET (BETHANECHOL CHLORIDE) ;  Route: ORAL  NITROFURANTOIN MACROCRYSTAL CAPSULE (NITROFURANTOIN MACROCRYSTAL CAPS)   AMLODIPINE BESYLATE 10 MG ORAL TABLET (AMLODIPINE BESYLATE) ; Route: ORAL  ALLEGRA ALLERGY 180 MG ORAL TABLET (FEXOFENADINE HCL) ; Route: ORAL  FAMOTIDINE 40 MG ORAL TABLET (FAMOTIDINE) TK 1 T PO QD HS  POTASSIUM CHLORIDE ER 10 MEQ ORAL CAPSULE EXTENDED RELEASE (POTASSIUM CHLORIDE) TK 2 CS PO BID WITH FOOD    Problems: Staghorn calculus (ICD-592.0) (OPG05-X71.0)  UTI (ICD-599.0) (SZJ99-J76.0)  RENAL CALCULUS (ICD-592.0) (TIZ21-O24.0)  MICROHEMATURIA (ICD-599.72) (PLF71-W08.2)    Illnesses: Iron deficiency anemia, spinal stenosis, chronic back pain, GERD, vitamin D deficiency, insomnia, hypokalemia, allergic rhinitis, Bowel Problems, and Kidney Problems. DENIES: Heart Disease, Pacemaker/Defibrillator, Lung Disease, Diabetes, High Blood Pressure, Stroke/Seizure, Bleeding Problems, HIV, Hepatitis, or Cancer. Surgeries: Laminectomy and fusion and Hysterectomy. Family History: DENIES: Kidney cancer, Kidney disease, Kidney stones, Breast cancer, Uterine cancer, Cervical cancer, Ovarian cancer. Social History: Retired. . Smoking status: Former Smoker. Drinks alcohol 4 or more times a week. System Review: Admits to: Leg Swelling, Constipation, Involuntary Urine Loss, Lower Extremity Weakness, Dry Skin, Difficulty Walking, and Impaired Sex Drive. DENIES: Unexplained Weight Loss, Dry Eyes, Dry Mouth, Shortness of Breath, Psychiatric Problems, Easy Bleeding, Rash.      EXAMINATION: Appearance: well-developed NAD Respiratory Effort: breathing easily Skin Inspection: warm and dry Orientation: oriented to person; time and place Mood/Affect: normal       URINALYSIS  Urine Dip: pH: 8.0, Bld: +++, LE: +++, Nit: Neg, Prot: Neg, Ket: Neg, Gluc: Neg  Urine Micro: WBC: 6-10, RBC: 1-2, Bacteria: TNTC    Prescription(s) Today: DOXYCYCLINE MONOHYDRATE 100 MG ORAL CAPSULE (DOXYCYCLINE MONOHYDRATE) one bid w/ food  #20[Capsule] x 0, 12/17/2019, Steve Kaufman LPN    IMPRESSION:    1. STAGHORN CALCULUS (PXI84-H44.0) - Unchanged: I have discussed the following treatment options with the patient to include:actively monitoring stones, MET, URS, ESWL or PCNL. Risks of the procedures were reviewed with the patient. We discussed that we will wait until she is recovered from her back surgery, and will RTO to discuss possible PCNL for her left sided stone and URS for right sided stone. All questions addressed and answered. Patient understands and agrees with the plan. 2. RENAL CALCULUS (NOE69-W11.0) - Unchanged: As above. 3. UTI (HID45-N45.0) - Deteriorated: UA today 6-10 WBC, 1-2 RBC, and TNTC Bacteria. Necessary to send urine for q-PCR to assess for infection, as well as any antibiotic resistance. Important for highly sensitive detection and rapid turn around time for results. Patient will be contacted to discuss results and any possible antibiotic prescription needed. Rx for doxycycline provided today. Risks Reviewed. PLAN: All questions and concerns were addressed with the patient prior to leaving the clinic. Patient understands and agrees with the plan. cc: Braeden Singh MD  Today's Services  E&M Service, Urinalysis Microscopic    Upcoming Orders  qPCR      By signing my name below, I, [facility], attest that this documentation has been prepared under the direct and in the presence of Levon Oden MD, MD.  Electronically Signed: [facility]   December 17, 2019 2:02 PM    I, Dr. Levon Oden MD, personally performed the services described in this documentation. All medical record entries made by the scribe were at my direction and in my presence. I have reviewed the chart and agree that the record reflects my personal performance and is accurate and complete.     Electronically signed by Levon Oden MD on 01/13/2020 at 9:32 AM    ________________________________________________________________________      Temp (24hrs), Av.4 °F (36.9 °C), Min:97.9 °F (36.6 °C), Max:99.3 °F (37.4 °C)    Urinary Status: Voiding, Incontinent briefs, Incontinent  Creatinine   Date/Time Value Ref Range Status   2020 04:15 AM 0.43 (L) 0.55 - 1.02 MG/DL Final   2020 04:22 AM 0.38 (L) 0.55 - 1.02 MG/DL Final   2020 11:31 AM 0.49 (L) 0.55 - 1.02 MG/DL Final   2012 06:50 PM 0.9 0.6 - 1.3 MG/DL Final     Current Antimicrobial Therapy (168h ago, onward)    Ordered     Start Stop    20 1209  Vancomycin Trough - Please draw level IMMEDIATELY PRIOR to the dose on 12/3 @ 0500, thanks  Other,   RX DOSING/MONITORING      20 0400 --    20 1640  vancomycin (VANCOCIN) 750 mg in 0.9% sodium chloride 250 mL (VIAL-MATE)  750 mg,   IntraVENous,   EVERY 12 HOURS      20 0500 --    20 1602  metroNIDAZOLE (FLAGYL) IVPB premix 500 mg  500 mg,   IntraVENous,   EVERY 12 HOURS      20 1700 20 1659    20 1636  Vancomycin - pharmacy to dose  Other,   RX DOSING/MONITORING      20 1636 --    20 1626  cefepime (MAXIPIME) 2 g in 0.9% sodium chloride (MBP/ADV) 100 mL MBP  2 g,   IntraVENous,   EVERY 8 HOURS      20 1627 --        Key Anti-Platelet Anticoagulant Meds     The patient is on no antiplatelet meds or anticoagulants.         Diet: DIET CARDIAC Regular  DIET ONE TIME MESSAGE -       Labs     Lab Results   Component Value Date/Time    Lactic acid 1.4 2020 11:31 AM    WBC 1.3 (L) 2020 04:15 AM    HCT 27.2 (L) 2020 04:15 AM    PLATELET 307  04:15 AM    Sodium 147 (H) 2020 04:15 AM    Potassium 3.2 (L) 2020 04:15 AM    Chloride 113 (H) 2020 04:15 AM    CO2 25 2020 04:15 AM    BUN 21 (H) 2020 04:15 AM    Creatinine 0.43 (L) 2020 04:15 AM    Glucose 94 2020 04:15 AM    Calcium 8.7 2020 04:15 AM     UA:   Lab Results   Component Value Date/Time    Color YELLOW/STRAW 2020 01:15 PM    Appearance CLEAR 2020 01:15 PM    Specific gravity 1.015 11/30/2020 01:15 PM    pH (UA) 5.5 11/30/2020 01:15 PM    Protein 300 (A) 11/30/2020 01:15 PM    Glucose Negative 11/30/2020 01:15 PM    Ketone Negative 11/30/2020 01:15 PM    Bilirubin Negative 11/30/2020 01:15 PM    Urobilinogen 0.2 11/30/2020 01:15 PM    Nitrites Negative 11/30/2020 01:15 PM    Leukocyte Esterase Negative 11/30/2020 01:15 PM    Epithelial cells FEW 11/30/2020 01:15 PM    Bacteria 4+ (A) 11/30/2020 01:15 PM    WBC 0-4 11/30/2020 01:15 PM    RBC 0-5 11/30/2020 01:15 PM     Imaging     Results for orders placed during the hospital encounter of 11/30/20   CT ABD PELV WO CONT    Narrative EXAM: CT CHEST WO CONT, CT ABD PELV WO CONT    INDICATION: neutropenic fever    COMPARISON: CT ABD and pelvis 8/15/2013 and chest x-ray 11/30/2020. TECHNIQUE:  5 mm axial images were obtained through the chest, abdomen, and  pelvis. Oral and IV contrast were not administered. Coronal and sagittal  reconstructions were generated. CT dose reduction was achieved through use of a  standardized protocol tailored for this examination and automatic exposure  control for dose modulation. FINDINGS:    THYROID: No nodule. MEDIASTINUM: No mass or lymphadenopathy. RENITA: No mass or lymphadenopathy. THORACIC AORTA: Atherosclerotic calcification without aneurysm. MAIN PULMONARY ARTERY: Normal in caliber. TRACHEA/BRONCHI: Patent. ESOPHAGUS: No wall thickening or dilatation. HEART: The heart is normal in size without pericardial effusion. Coronary artery  calcifications are noted. PLEURA: Bilateral pleural effusions. No pneumothorax. LUNGS: Calcified granulomata. Dependent atelectasis overlying pleural effusions. Aerated lungs are otherwise clear. LIVER: No mass or biliary dilation. GALLBLADDER: Unremarkable. SPLEEN: Calcified granulomata. Otherwise unremarkable. PANCREAS: No mass or ductal dilation. ADRENALS: Unremarkable.   KIDNEYS: Early staghorn calculus lower pole collecting system left kidney  measures 1.8 x 1.1 cm. Right renal pelvis calculus measures 7 x 10 mm. No  hydronephrosis, mass, or other abnormality. No ureteral dilation. STOMACH: Unremarkable. SMALL BOWEL: No dilatation or wall thickening. COLON: Noninflamed appearing sigmoid colon diverticula. Moderate rectal fecal  retention. No dilation or wall thickening. APPENDIX: Unremarkable. PERITONEUM: No ascites or pneumoperitoneum. RETROPERITONEUM: Atherosclerotic calcination without aneurysm. No enlarged  lymphadenopathy. REPRODUCTIVE ORGANS: Uterus is surgically absent. Right ovary is not seen and is  likely surgically absent. Probable atrophic left ovary seen which otherwise  appears unremarkable. URINARY BLADDER: Small nondependent intraluminal bubble of air. Otherwise  unremarkable with no mass or calculus. BONES: Posterior ricky and screw spinal hardware traversing from the upper lumbar  spine to the sacrum. No acute fracture or aggressive lesion. ADDITIONAL COMMENTS: N/A      Impression IMPRESSION:  1. Bilateral pleural effusions with overlying atelectasis. 2. Coronary artery disease  3. Nonobstructed bilateral nephrolithiasis. 4. Moderate rectal fecal retention. 5. Additional incidentals as above. US Results (most recent):  No results found for this or any previous visit.     Cultures     All Micro Results     Procedure Component Value Units Date/Time    CULTURE, URINE [[id number]]  (Abnormal) Collected:  12/01/20 1315    Order Status:  Completed Specimen:  Urine from Clean catch Updated:  12/02/20 1212     Special Requests: NO SPECIAL REQUESTS        [place] Count --        >100,000  COLONIES/mL       Culture result: GRAM NEGATIVE RODS               CHECKING FOR POSSIBLE 2ND GRAM NEGATIVE RICKY          CULTURE, MRSA [[id number]] Collected:  12/01/20 1427    Order Status:  Completed Specimen:  Nasal from Nares Updated:  12/02/20 0750     Special Requests: NO SPECIAL REQUESTS        Culture result:       MRSA NOT PRESENT AT 16 HOURS          CULTURE, BLOOD, PAIRED [[id number]] Collected:  11/30/20 1131    Order Status:  Completed Specimen:  Blood Updated:  12/02/20 0506     Special Requests: NO SPECIAL REQUESTS        Culture result: NO GROWTH 2 DAYS       CULTURE, BLOOD, PAIRED [[id number]]     Order Status:  Canceled Specimen:  Blood     URINE CULTURE HOLD SAMPLE [[id number]] Collected:  11/30/20 1315    Order Status:  Completed Specimen:  Urine from Serum Updated:  11/30/20 1350     Urine culture hold       Urine on hold in Microbiology dept for 2 days. If unpreserved urine is submitted, it cannot be used for addtional testing after 24 hours, recollection will be required. URINE CULTURE HOLD SAMPLE [[id number]] Collected:  11/30/20 1131    Order Status:  Completed Specimen:  Serum Updated:  11/30/20 1154     Urine culture hold       Urine on hold in Microbiology dept for 2 days. If unpreserved urine is submitted, it cannot be used for addtional testing after 24 hours, recollection will be required. Past History: (Complete 2+/3 areas)     Allergies   Allergen Reactions    Fentanyl Anaphylaxis     Patch    Celebrex [Celecoxib] Other (comments)     11/30/2020:  As per [facility] pharmacist    Duloxetine Nausea Only     Other reaction(s): Nausea Only    Sulfa (Sulfonamide Antibiotics) Other (comments)     11/20/2020: per       Current Facility-Administered Medications   Medication Dose Route Frequency    famotidine (PEPCID) tablet 20 mg  20 mg Oral DAILY    potassium chloride SR (KLOR-CON 10) tablet 20 mEq  20 mEq Oral BID    [START ON 12/3/2020] Vancomycin Trough - Please draw level IMMEDIATELY PRIOR to the dose on 12/3 @ 0500, thanks   Other Rx Dosing/Monitoring    balsam peru-castor oiL (VENELEX) ointment   Topical TID    [START ON 12/3/2020] epoetin annie-epbx (RETACRIT) injection 20,000 Units  20,000 Units SubCUTAneous Q7D    pregabalin (LYRICA) capsule 150 mg 150 mg Oral QHS    oxyCODONE IR (ROXICODONE) tablet 5 mg  5 mg Oral Q4H PRN    docusate sodium (COLACE) capsule 100 mg  100 mg Oral BID    sodium chloride (NS) flush 5-40 mL  5-40 mL IntraVENous Q8H    sodium chloride (NS) flush 5-40 mL  5-40 mL IntraVENous PRN    0.9% sodium chloride infusion  75 mL/hr IntraVENous CONTINUOUS    acetaminophen (TYLENOL) tablet 650 mg  650 mg Oral Q4H PRN    heparin (porcine) injection 5,000 Units  5,000 Units SubCUTAneous Q8H    cefepime (MAXIPIME) 2 g in 0.9% sodium chloride (MBP/ADV) 100 mL MBP  2 g IntraVENous Q8H    metroNIDAZOLE (FLAGYL) IVPB premix 500 mg  500 mg IntraVENous Q12H    Vancomycin - pharmacy to dose   Other Rx Dosing/Monitoring    vancomycin (VANCOCIN) 750 mg in 0.9% sodium chloride 250 mL (VIAL-MATE)  750 mg IntraVENous Q12H    Prior to Admission medications    Medication Sig Start Date End Date Taking? Authorizing Provider   potassium chloride SA (MICRO-K) 10 mEq capsule Take 20 mEq by mouth two (2) times a day. Yes Provider, Historical   pregabalin (LYRICA) 75 mg capsule Take 75 mg by mouth two (2) times a day. Takes 75 mg BID and 150 mg QHS   Yes Provider, Historical   pregabalin (LYRICA) 150 mg capsule Take 150 mg by mouth nightly. Takes 75 mg BID and 150 mg QHS   Yes Provider, Historical   bethanechol (URECHOLINE) 10 mg tablet Take 10 mg by mouth Before breakfast, lunch, and dinner. Yes Provider, Historical   zaleplon (SONATA) 5 mg capsule Take 5 mg by mouth nightly. Yes Other, MD Page   doxycycline (MONODOX) 50 mg capsule Take 50 mg by mouth two (2) times a day. Provider, Historical   nitrofurantoin (MACROBID) 100 mg capsule Take 100 mg by mouth two (2) times a day. Provider, Historical   docusate sodium (COLACE) 100 mg capsule Take 100 mg by mouth two (2) times a day.     Provider, Historical        PMHx:  has a past medical history of Chronic pain, Chronic pain disorder (1984-current ), Degenerated intervertebral disc, Melanosis of colon, Nausea & vomiting, Psychiatric disorder, and PUD (peptic ulcer disease). She also has no past medical history of Unspecified sleep apnea. PSurgHx:  has a past surgical history that includes pr abdomen surgery proc unlisted; hx gyn; neurological procedure unlisted; hx orthopaedic; and hx urological.  PSocHx:  reports that she has never smoked. She does not have any smokeless tobacco history on file. She reports current alcohol use. She reports that she does not use drugs. ROS:  (Complete - 10 systems) - DENIES: Weightloss (Constitutional), Dry mouth (ENMT), Chest pain (CV), SOB (Respiratory), Constipation (GI), Weakness (MS), Pallor (Skin), TIA Sx (Neuro), Confusion (Psych), Easy bruising (Heme)    Physical Exam: (Comprehesive - 8+ 1995 Systems)     (1) Constitutional:  FIO2:   on SpO2: O2 Sat (%): 98 %  O2 Device: Room air O2 Flow Rate (L/min): 2 l/min  Patient Vitals for the past 24 hrs:   BP Temp Pulse Resp SpO2 Weight   12/02/20 1127 (!) 146/70 97.9 °F (36.6 °C) 83 18 98 %    12/02/20 0800 126/80 98 °F (36.7 °C) 80 17 98 %    12/02/20 0348 (!) 106/52 98.1 °F (36.7 °C) 94 20 98 % 63.5 kg (140 lb)   12/02/20 0000   97      12/01/20 2321 119/64 99.3 °F (37.4 °C) 97 18 97 %    12/01/20 2032 (!) 114/53 99 °F (37.2 °C) (!) 103 20 95 %    12/01/20 2000   94      12/01/20 1848 129/72 98 °F (36.7 °C) 96 20 97 %    12/01/20 1700 121/65 98.4 °F (36.9 °C) 97 20 93 %    12/01/20 1500 (!) 107/57  93 15 92 %    12/01/20 1400 (!) 117/54  (!) 107 22 92 %        Date 12/01/20 0700 - 12/02/20 0659 12/02/20 0700 - 12/03/20 0659   Shift 0700-1859 1900-0659 24 Hour Total 0763-5152 0544-8321 24 Hour Total   INTAKE   I.V.(mL/kg/hr) 450(0.6) 500(0.7) 950(0.6) 1506. 3  1506.3     Volume (0.9% sodium chloride infusion) 0  0 906.3  906. 3     Volume (metroNIDAZOLE (FLAGYL) IVPB premix 500 mg) 100 200 300        Volume (cefepime (MAXIPIME) 2 g in 0.9% sodium chloride (MBP/ADV) 100 mL MBP) 100 300 400 100  100 Volume (vancomycin (VANCOCIN) 750 mg in 0.9% sodium chloride 250 mL (VIAL-MATE)) 250  250 500  500   Shift Total(mL/kg) 450(7.3) 500(7.9) 950(15) 1506.3(23.7)  1506.3(23.7)   OUTPUT   Urine(mL/kg/hr)           Urine Occurrence(s) 1 x  1 x      Shift Total(mL/kg)          . 3  1506.3   Weight (kg) 61.3 63.5 63.5 63.5 63.5 63.5      (2)      (3)      (4)      (5)      (6)      (7)      (8)      (9)         Signed By: Zeenat Vega NP  - December 2, 2020

## 2020-12-02 NOTE — PROGRESS NOTES
{[facility] BEDSIDE_VERBAL_RECORDED_WRITTEN:73825::\"Bedside\"} shift change report given to *** (oncoming nurse) by *** (offgoing nurse). Report included the following information SBAR, Kardex, Intake/Output, MAR, Recent Results and Cardiac Rhythm Sinus Techy.

## 2020-12-02 NOTE — PROGRESS NOTES
[address] [facility] Adult  Hospitalist Group Hospitalist Progress Note Rehana Headings, NP Answering service: [phone] OR [phone] from in house phone Date of Service:  2020 NAME:  Triny De La Cruz :  1945 MRN:  [id number] Admission Summary:  
is a 70 y.o. female with a PMH Nephrolithiasis, Bladder Obstruction: frequent UTI's- on maintenance nitrofurantoin, PUD, osteoarthritis, Radial Nerve Palsy since - followed by Neuro, Penicious Anemia, Hx Spinal Sx who presented with AMS, hx obtained from the patient's , patient was confused. Patient's  reports that the patient had a bad reaction to sulfa antibiotics in 2020, since then she has had low blood counts and has been seeing [facility] for management. Patient has been having recurrent UTI's, followed by [facility], last visit x5 days ago was not able to see the urologist, was placed on antibiotics. The  reports that since then she had progressively worsened, experiencing trouble walking, more confused and disoriented, he reports the patient fell down 4 days back and bruised her shins and has been complaining of some pain in both her shins and she spiked a fever. The  reported that he called EMS on  but patient was not transported to the ER last night, she continued to be more confused. In the ED patient was found to have a temperature of 100.5 and a pulse ox of 85%, confused, c/o left sided weakness- chronic and denied any other complaints. No c/o Headache, blurry vision, sore throat, trouble swallowing, trouble with speech, chills, N/V/D, abd pain, recent travels, sick contacts, rashes, contact with COVID-19 diagnosed patients, hematemesis, melena, hemoptysis, hematuria, rashes. Interval history / Subjective: Follow-up for issues listed below.  (LLL PNA, Acute Metabolic Encephalopathy, Anemia/ Leukopenia, Constipation and Acute Hypoxic Respiratory Failure)   
-Patient seen and examined, no c/o's. Assessment & Plan: LLL PNA: neutropenic fever, bone marrow dysfunction that was attributed to Bactrim. 
-telemetry 
-IVF's 
-SARS-CoV-2: negative 
-oxygen support, pulse ox monitoring 
-pro-Vlad level 
-CT chest: Bilateral pleural effusions with overlying atelectasis. Coronary artery disease. Nonobstructed bilateral nephrolithiasis. Moderate rectal fecal retention. -ID following 
-IV ABT: Vanc and Cefepime and Flagyl 
  
Acute metabolic encephalopathy: 2/2 to above. -CT head: Indeterminate hyperdensity just above the sella turcica posteriorly. Contrast-enhanced MRI of the brain for further delineation recommended, findings consistent with mild chronic microvascular ischemic change, moderate to severe temporal predominant degree of cerebral atrophy. 
-Neuro following: MRI C spine, Brain 
-IV ABT: Vanc and Cefepime and Flagyl 
-IVF's 
-neurovascular checks 
-MRI brain: pending 
-MRI C spine: r/o cord compression-pending -TSH: 1.64 
-B12: 2,000.  
-fall precautions 
-aspiration precautions, symptoms persist may consider further intervention and diagnostics, -urine culture, NGTD 
-BCNGTD, MRSA- negative Neutropenic Fever:  
-monitor CBC 
-Heme-onc consult: Myelodysplastic syndrome is the cause of her cytopenias Myelodysplastic syndrome: Heme/onc following, luspatercept-a monoclonal antibody that stimulates normoblasts to make more red blood cells, out-pt. -avoid granulocyte colony-stimulating factor to avoid stimulating leukemic clone ex. neupogen, filgrastim). -weekly procrit/retacrit Anemia: Chronic vs Acute, no obvious signs of bleeding. 
-monitor CBC 
-Heme-onc consult Chronic Leukopenia/ Neutropenia: following adverse reaction to sulfa drug. Heme/Onc.  
Chronic UTI's, Hx Nephrolithiasis and Bladder Obstruction: followed by [facility] 
-Urology consulted 
  
 Acute Hypoxic Respiratory Failure: 2/2 to above. -ABG stable 
-plan as above Constipation: bowel regimen, colace. POA Left lower leg, abrasion from fall: 5 x 3 x 0.1 cm, 100% pink, no exudate, no odor. Periwound intact from erythema. 
-daily cleanse with saline; apply Xeroform and dry dressing. 
-Sacrum, buttocks and heels: Venelex TID. DVTppx: Heparin Code Status: Full Code Diet: Cardiac Activity: OOB to chair TID and PRN Discharge: TBD Hospital Problems  Never Reviewed Codes Class Noted POA  
 SOB (shortness of breath) ICD-10-CM: R06.02 
ICD-9-CM: 786.05  11/30/2020 Unknown Review of Systems: A comprehensive review of systems was negative except for that written in the HPI. Vital Signs:  
 Last 24hrs VS reviewed since prior progress note. Most recent are: 
Visit Vitals BP (!) 106/52 Pulse 94 Temp 98.1 °F (36.7 °C) Resp 20 Ht 5' 4.02\" (1.626 m) Wt 63.5 kg (140 lb) SpO2 98% BMI 24.02 kg/m² No intake or output data in the 24 hours ending 12/02/20 7157 Physical Examination:  
 
I had a face to face encounter with this patient and independently examined them on 12/2/2020 as outlined below: 
 
Constitutional:  No acute distress, cooperative, pleasant ENT:  Oral mucosa moist.   
Resp:  CTA bilaterally. No wheezing/rhonchi/rales. No accessory muscle use. CV:  Regular rhythm, normal rate, no murmurs, gallops, rubs. /GI:  Soft, non distended, non tender, no guarding, BS present. Musculoskeletal:  No edema, warm, 2+ pulses throughout. Neurologic:  Moves all extremities. AAOx3, CN II-XII reviewed. Skin:  Good turgor, no rashes or ulcers Psych:  Good insight, Not anxious nor agitated. Data Review:  
 Review and/or order of clinical lab test 
 
 
Labs:  
 
Recent Labs 12/02/20 0415 12/01/20 
0422 WBC 1.3* 1.5* HGB 8.5* 7.2* HCT 27.2* 23.9*  
 216 Recent Labs 12/02/20 0415 12/01/20 0422 11/30/20 
1131 * 147* 142  
K 3.2* 3.6 4.1 * 112* 110* CO2 25 26 30 BUN 21* 18 21* CREA 0.43* 0.38* 0.49* GLU 94 104* 112* CA 8.7 8.2* 8.6 Recent Labs 12/02/20 
0415 12/01/20 0422 11/30/20 
1131 ALT 12 14 21 AP 62 62 77 TBILI 0.4 0.5 0.5 TP 4.8* 4.2* 5.1* ALB 1.8* 1.9* 2.3*  
GLOB 3.0 2.3 2.8 No results for input(s): INR, PTP, APTT, INREXT in the last 72 hours. No results for input(s): FE, TIBC, PSAT, FERR in the last 72 hours. No results found for: FOL, RBCF No results for input(s): PH, PCO2, PO2 in the last 72 hours. Recent Labs 12/01/20 0422 11/30/20 
1131 TROIQ <0.05 <0.05 No results found for: CHOL, CHOLX, CHLST, CHOLV, HDL, HDLP, LDL, LDLC, DLDLP, TGLX, TRIGL, TRIGP, CHHD, CHHDX No results found for: [facility] Lab Results Component Value Date/Time Color YELLOW/STRAW 11/30/2020 01:15 PM  
 Appearance CLEAR 11/30/2020 01:15 PM  
 Specific gravity 1.015 11/30/2020 01:15 PM  
 pH (UA) 5.5 11/30/2020 01:15 PM  
 Protein 300 (A) 11/30/2020 01:15 PM  
 Glucose Negative 11/30/2020 01:15 PM  
 Ketone Negative 11/30/2020 01:15 PM  
 Bilirubin Negative 11/30/2020 01:15 PM  
 Urobilinogen 0.2 11/30/2020 01:15 PM  
 Nitrites Negative 11/30/2020 01:15 PM  
 Leukocyte Esterase Negative 11/30/2020 01:15 PM  
 Epithelial cells FEW 11/30/2020 01:15 PM  
 Bacteria 4+ (A) 11/30/2020 01:15 PM  
 WBC 0-4 11/30/2020 01:15 PM  
 RBC 0-5 11/30/2020 01:15 PM  
 
 
 
Medications Reviewed:  
 
Current Facility-Administered Medications Medication Dose Route Frequency  balsam peru-castor oiL (VENELEX) ointment   Topical TID  [START ON 12/3/2020] epoetin annie-epbx (RETACRIT) injection 20,000 Units  20,000 Units SubCUTAneous Q7D  pregabalin (LYRICA) capsule 150 mg  150 mg Oral QHS  oxyCODONE IR (ROXICODONE) tablet 5 mg  5 mg Oral Q4H PRN  
 docusate sodium (COLACE) capsule 100 mg  100 mg Oral BID  
  sodium chloride (NS) flush 5-40 mL  5-40 mL IntraVENous Q8H  
 sodium chloride (NS) flush 5-40 mL  5-40 mL IntraVENous PRN  
 0.9% sodium chloride infusion  75 mL/hr IntraVENous CONTINUOUS  
 acetaminophen (TYLENOL) tablet 650 mg  650 mg Oral Q4H PRN  
 heparin (porcine) injection 5,000 Units  5,000 Units SubCUTAneous Q8H  
 cefepime (MAXIPIME) 2 g in 0.9% sodium chloride (MBP/ADV) 100 mL MBP  2 g IntraVENous Q8H  
 metroNIDAZOLE (FLAGYL) IVPB premix 500 mg  500 mg IntraVENous Q12H  Vancomycin - pharmacy to dose   Other Rx Dosing/Monitoring  vancomycin (VANCOCIN) 750 mg in 0.9% sodium chloride 250 mL (VIAL-MATE)  750 mg IntraVENous Q12H  
 
______________________________________________________________________ EXPECTED LENGTH OF STAY: - - - 
ACTUAL LENGTH OF STAY:          1 Basil Fraga NP

## 2020-12-02 NOTE — PROGRESS NOTES
Problem: Mobility Impaired (Adult and Pediatric)  Goal: *Acute Goals and Plan of Care (Insert Text)  Description: FUNCTIONAL STATUS PRIOR TO ADMISSION: Patient was modified independent using a rolling walker for functional mobility. HOME SUPPORT PRIOR TO ADMISSION: The patient lived with  but did not require assist.    Physical Therapy Goals  Initiated 12/2/2020  1. Patient will move from supine to sit and sit to supine  in bed with supervision/set-up within 7 day(s). 2.  Patient will transfer from bed to chair and chair to bed with minimal assistance/contact guard assist using the least restrictive device within 7 day(s). 3.  Patient will perform sit to stand with minimal assistance/contact guard assist within 7 day(s). 4.  Patient will ambulate with minimal assistance/contact guard assist for 10 feet with the least restrictive device within 7 day(s). Outcome: Progressing Towards Goal     PHYSICAL THERAPY EVALUATION  Patient: Jeannine Rodriguez (69 y.o. female)  Date: 12/2/2020  Primary Diagnosis: SOB (shortness of breath) [R06.02]  SOB (shortness of breath) [R06.02]        Precautions: Fall       ASSESSMENT  Based on the objective data described below, the patient presents with generalized weakness, poor activity tolerance, and pain limiting function. Pt and  report that prior to admission, pt was Mod I for all mobility with use of RW but experienced a fall while at an appt 4 days ago and has been declining since. She is currently requiring Max A for bed mobility and sit to stand transfers and displays a signficant posterior lean with standing requiring Max to Total A to prevent post LOB. Transfer to chair was unsafe to attempt today.  reports that he is no able to care for his wife at her current state and is open to discussing rehab options. Acute PT will continue to follow pt while she remains in the acute setting.  Rec IPR vs SNF to maximize function, safety and tolerance prior to dc to home. Current Level of Function Impacting Discharge (mobility/balance): Max A for bed mobility and sit to stand transfers     Functional Outcome Measure: The patient scored 10/100 on the Barthel Index outcome measure which is indicative of 90% functional impairment. Other factors to consider for discharge: 7 steps to enter home.  cannot provide level of care needed at this time     Patient will benefit from skilled therapy intervention to address the above noted impairments. PLAN :  Recommendations and Planned Interventions: bed mobility training, transfer training, gait training, patient and family training/education, and therapeutic activities      Frequency/Duration: Patient will be followed by physical therapy:  5 times a week to address goals. Recommendation for discharge: (in order for the patient to meet his/her long term goals)  Therapy 3 hours per day 5-7 days per week    This discharge recommendation:  Has been made in collaboration with the attending provider and/or case management    IF patient discharges home will need the following DME: patient owns DME required for discharge         SUBJECTIVE:   Patient stated I am just so weak.     OBJECTIVE DATA SUMMARY:   HISTORY:    Past Medical History:   Diagnosis Date    Chronic pain     Chronic pain disorder 1984-current     Degenerated intervertebral disc     Melanosis of colon     Nausea & vomiting     Psychiatric disorder     chronic Pain     PUD (peptic ulcer disease)      Past Surgical History:   Procedure Laterality Date    ABDOMEN SURGERY PROC UNLISTED      HX GYN      HX ORTHOPAEDIC      HX UROLOGICAL      NEUROLOGICAL PROCEDURE UNLISTED         Personal factors and/or comorbidities impacting plan of care:     Home Situation  Home Environment: Private residence  # Steps to Enter: 7  Rails to Enter: Yes  Hand Rails : Bilateral  One/Two Story Residence: One story  Living Alone: No  Support Systems: Spouse/Significant Other/Partner  Patient Expects to be Discharged to[de-identified] Private residence  Current DME Used/Available at Home: Janay Gowers, rolling, Cane, straight, Commode, bedside(transport chair)    EXAMINATION/PRESENTATION/DECISION MAKING:   Critical Behavior:  Neurologic State: Alert  Orientation Level: Oriented to place, Oriented to person, Oriented to situation, Disoriented to time  Cognition: Impaired decision making, Follows commands, Decreased attention/concentration, Recognition of people/places, Memory loss     Hearing: Auditory  Auditory Impairment: None  Skin:  Defer to RN notes  Edema: Defer to RN notes  Range Of Motion:  AROM: Generally decreased, functional           PROM: Generally decreased, functional           Strength:    Strength: Generally decreased, functional                    Tone & Sensation:   Tone: Abnormal              Sensation: Intact               Coordination:  Coordination: Generally decreased, functional  Vision:      Functional Mobility:  Bed Mobility:  Rolling: Minimum assistance  Supine to Sit: Maximum assistance  Sit to Supine: Maximum assistance  Scooting: Moderate assistance  Transfers:  Sit to Stand: Maximum assistance  Stand to Sit: Maximum assistance                       Balance:   Sitting: Intact  Standing: Impaired  Standing - Static: Poor;Constant support  Ambulation/Gait Training:                                                    Functional Measure:  Barthel Index:    Bathin  Bladder: 0  Bowels: 0  Groomin  Dressin  Feedin  Mobility: 0  Stairs: 0  Toilet Use: 0  Transfer (Bed to Chair and Back): 0  Total: 10/100       The Barthel ADL Index: Guidelines  1. The index should be used as a record of what a patient does, not as a record of what a patient could do. 2. The main aim is to establish degree of independence from any help, physical or verbal, however minor and for whatever reason.   3. The need for supervision renders the patient not independent. 4. A patient's performance should be established using the best available evidence. Asking the patient, friends/relatives and nurses are the usual sources, but direct observation and common sense are also important. However direct testing is not needed. 5. Usually the patient's performance over the preceding 24-48 hours is important, but occasionally longer periods will be relevant. 6. Middle categories imply that the patient supplies over 50 per cent of the effort. 7. Use of aids to be independent is allowed. Agustin Lee., Barthel, D.W. (1258). Functional evaluation: the Barthel Index. [address] [facility] (14)2. Joanie Stubbs della LENORA Lopez, Tad Givens., Lula Hall., [place], [address] (). Measuring the change indisability after inpatient rehabilitation; comparison of the responsiveness of the Barthel Index and Functional [place] Measure. Journal of Neurology, Neurosurgery, and Psychiatry, 66(4), 120-630. Titi Weiner, N.J.A, CHANELL Coughlin, & Raghu Pratt M.A. (2004.) Assessment of post-stroke quality of life in cost-effectiveness studies: The usefulness of the Barthel Index and the EuroQoL-5D.  Quality of Life Research, 15, 247-10           Physical Therapy Evaluation Charge Determination   History Examination Presentation Decision-Making   HIGH Complexity :3+ comorbidities / personal factors will impact the outcome/ POC  MEDIUM Complexity : 3 Standardized tests and measures addressing body structure, function, activity limitation and / or participation in recreation  MEDIUM Complexity : Evolving with changing characteristics  MEDIUM Complexity : FOTO score of 26-74      Based on the above components, the patient evaluation is determined to be of the following complexity level: MEDIUM    Pain Ratin/10 pain in buttocks    Activity Tolerance:   Fair and requires frequent rest breaks    After treatment patient left in no apparent distress:   Supine in bed, Call bell within reach, and Caregiver / family present    COMMUNICATION/EDUCATION:   The patients plan of care was discussed with: Registered nurse. Fall prevention education was provided and the patient/caregiver indicated understanding., Patient/family have participated as able in goal setting and plan of care. , and Patient/family agree to work toward stated goals and plan of care.     Thank you for this referral.  Krystyna Gonzales PT, DPT   Time Calculation: 45 mins

## 2020-12-02 NOTE — PROGRESS NOTES
12/2/2020 -   TORSTEN:  - RUR: 11%  - Disposition is TBD dependent on progression  - Patient received IM Letter 12/1    - Patient remains confusion  - Brain MRI is pending  - C-Spine MRI is pending  - ABX and IVF continue  - Cultures pending  - Patient may benefit from PT and OT Consults  CRM: Jimmy Doshi, MPH, [facility]; Z: [phone]

## 2020-12-02 NOTE — ROUTINE PROCESS
Occupational Therapy Screening: 
Services maybe indicated at this time. [facility] for [facility] therapy based on results obtained during the nursing admission assessment. The patients chart was reviewed . Please order a consult for occupational therapy if patient has had a decline in function from baseline and you would like an evaluation to be completed. Thank you.

## 2020-12-02 NOTE — PROGRESS NOTES
[facility] Progress Note           Emily Bills DO    [phone] Office  [phone]  Fax    2020      Assessment & Plan:   · GNR UTI. UA bland but will need to treat in setting of fever and neutropenia. CT reveals nephrolithiasis but no obstruction. Blood cultures sterile to date. Narrow antibiotics to cefepime. · Fever. Likely due to above. No further elevated temperatures since admission. · Myelodysplastic syndrome with chronic leukopenia/neutropenia. Oncology following  · AMS. Likely related to infection. Improving  · Sulfa allergy          Subjective:     More alert today. No specific complaints    Objective:     Vitals:   Visit Vitals  BP (!) 146/70 (BP 1 Location: Left arm, BP Patient Position: At rest)   Pulse 83   Temp 97.9 °F (36.6 °C)   Resp 18   Ht 5' 4.02\" (1.626 m)   Wt 140 lb (63.5 kg)   SpO2 98%   BMI 24.02 kg/m²        Tmax:  Temp (24hrs), Av.4 °F (36.9 °C), Min:97.9 °F (36.6 °C), Max:99.3 °F (37.4 °C)      Exam:   Patient is intubated:  no    Physical Examination:   General:  Alert, cooperative, no distress   Head:  Normocephalic, atraumatic. Eyes:  Conjunctivae clear   Neck: Supple       Lungs:   No distress. Clear to auscultation anteriorly   Chest wall:     Heart:  Regular rate and rhythm   Abdomen:   Soft, non-tender, non-distended   Extremities: Moves all. Skin:  No rash   Neurologic: CNII-XII intact. Labs:        No lab exists for component: ITNL   Recent Labs     20  0422 20  1131   TROIQ <0.05 <0.05     Recent Labs     20  0415 20  0422 20  1131   * 147* 142   K 3.2* 3.6 4.1   * 112* 110*   CO2 25 26 30   BUN 21* 18 21*   CREA 0.43* 0.38* 0.49*   GLU 94 104* 112*   ALB 1.8* 1.9* 2.3*   WBC 1.3* 1.5* 1.2*   HGB 8.5* 7.2* 8.5*   HCT 27.2* 23.9* 27.1*    216 274     No results for input(s): INR, PTP, APTT, INREXT in the last 72 hours.   Needs: urine analysis, urine sodium, protein and creatinine  No results found for: TANISHA, CREAU      Cultures:     Lab Results   Component Value Date/Time    Specimen Description: URINE 04/23/2012 07:45 PM     Lab Results   Component Value Date/Time    Culture result: MRSA NOT PRESENT AT 22 HOURS 12/01/2020 02:27 PM    Culture result: GRAM NEGATIVE RODS (A) 12/01/2020 01:15 PM    Culture result: CHECKING FOR POSSIBLE 2ND GRAM NEGATIVE RICKY (A) 12/01/2020 01:15 PM       Radiology:     Medications       Current Facility-Administered Medications   Medication Dose Route Frequency Last Dose    famotidine (PEPCID) tablet 20 mg  20 mg Oral DAILY 20 mg at 12/02/20 1005    potassium chloride SR (KLOR-CON 10) tablet 20 mEq  20 mEq Oral BID 20 mEq at 12/02/20 1005    balsam peru-castor oiL (VENELEX) ointment   Topical TID      [START ON 12/3/2020] epoetin annie-epbx (RETACRIT) injection 20,000 Units  20,000 Units SubCUTAneous Q7D      pregabalin (LYRICA) capsule 150 mg  150 mg Oral  mg at 12/01/20 2145    oxyCODONE IR (ROXICODONE) tablet 5 mg  5 mg Oral Q4H PRN 5 mg at 12/01/20 2145    docusate sodium (COLACE) capsule 100 mg  100 mg Oral  mg at 12/02/20 1013    sodium chloride (NS) flush 5-40 mL  5-40 mL IntraVENous Q8H 10 mL at 12/02/20 0543    sodium chloride (NS) flush 5-40 mL  5-40 mL IntraVENous PRN      0.9% sodium chloride infusion  75 mL/hr IntraVENous CONTINUOUS 75 mL/hr at 12/01/20 2355    acetaminophen (TYLENOL) tablet 650 mg  650 mg Oral Q4H  mg at 12/01/20 1907    heparin (porcine) injection 5,000 Units  5,000 Units SubCUTAneous Q8H 5,000 Units at 12/02/20 1005    cefepime (MAXIPIME) 2 g in 0.9% sodium chloride (MBP/ADV) 100 mL MBP  2 g IntraVENous Q8H 2 g at 12/02/20 1013           Case discussed with:      Alex Hoffman DO

## 2020-12-02 NOTE — PROGRESS NOTES
Bedside and Verbal shift change report given to Johanna Hook (oncoming nurse) by DEMI Gill (offgoing nurse). Report included the following information SBAR, Kardex, Intake/Output, MAR, Recent Results and Cardiac Rhythm sinus techy.

## 2020-12-02 NOTE — PROGRESS NOTES
Bedside and Verbal shift change report given to Sharda Alicea (oncoming nurse) by Lore Bettencourt (offgoing nurse). Report included the following information SBAR, Kardex, Intake/Output, MAR, Recent Results and Cardiac Rhythm NSR.

## 2020-12-03 LAB
ALBUMIN SERPL ELPH-MCNC: 2.1 G/DL (ref 2.9–4.4)
ALBUMIN SERPL-MCNC: 1.6 G/DL (ref 3.5–5)
ALBUMIN/GLOB SERPL: 0.6 {RATIO} (ref 1.1–2.2)
ALBUMIN/GLOB SERPL: 1 {RATIO} (ref 0.7–1.7)
ALP SERPL-CCNC: 58 U/L (ref 45–117)
ALPHA1 GLOB SERPL ELPH-MCNC: 0.4 G/DL (ref 0–0.4)
ALPHA2 GLOB SERPL ELPH-MCNC: 0.3 G/DL (ref 0.4–1)
ALT SERPL-CCNC: 12 U/L (ref 12–78)
ANA SER QL: NEGATIVE
ANION GAP SERPL CALC-SCNC: 7 MMOL/L (ref 5–15)
AST SERPL-CCNC: 10 U/L (ref 15–37)
B-GLOBULIN SERPL ELPH-MCNC: 1.3 G/DL (ref 0.7–1.3)
BASOPHILS # BLD: 0 K/UL (ref 0–0.1)
BASOPHILS # BLD: NORMAL K/UL
BASOPHILS NFR BLD: 3 % (ref 0–1)
BASOPHILS NFR BLD: NORMAL % (ref 0–1)
BILIRUB SERPL-MCNC: 0.4 MG/DL (ref 0.2–1)
BUN SERPL-MCNC: 24 MG/DL (ref 6–20)
BUN/CREAT SERPL: 50 (ref 12–20)
CALCIUM SERPL-MCNC: 8.3 MG/DL (ref 8.5–10.1)
CHLORIDE SERPL-SCNC: 119 MMOL/L (ref 97–108)
CO2 SERPL-SCNC: 21 MMOL/L (ref 21–32)
COMMENT, HOLDF: NORMAL
CREAT SERPL-MCNC: 0.48 MG/DL (ref 0.55–1.02)
DIFFERENTIAL METHOD BLD: ABNORMAL
DIFFERENTIAL METHOD BLD: NORMAL
EOSINOPHIL # BLD: 0.1 K/UL (ref 0–0.4)
EOSINOPHIL # BLD: NORMAL K/UL
EOSINOPHIL NFR BLD: 6 % (ref 0–7)
EOSINOPHIL NFR BLD: NORMAL % (ref 0–5)
ERYTHROCYTE [DISTWIDTH] IN BLOOD BY AUTOMATED COUNT: 22.8 % (ref 11.5–14.5)
ERYTHROCYTE [DISTWIDTH] IN BLOOD BY AUTOMATED COUNT: 23.3 % (ref 11.5–14.5)
ERYTHROCYTE [DISTWIDTH] IN BLOOD BY AUTOMATED COUNT: NORMAL % (ref 11.5–14.5)
EST. AVERAGE GLUCOSE BLD GHB EST-MCNC: 88 MG/DL
GAMMA GLOB SERPL ELPH-MCNC: 0.3 G/DL (ref 0.4–1.8)
GLOBULIN SER CALC-MCNC: 2.2 G/DL (ref 2.2–3.9)
GLOBULIN SER CALC-MCNC: 2.9 G/DL (ref 2–4)
GLUCOSE SERPL-MCNC: 83 MG/DL (ref 65–100)
HBA1C MFR BLD: 4.7 % (ref 4–5.6)
HCT VFR BLD AUTO: 27.4 % (ref 35–47)
HCT VFR BLD AUTO: 28.6 % (ref 35–47)
HCT VFR BLD AUTO: NORMAL %
HGB BLD-MCNC: 8.5 G/DL (ref 11.5–16)
HGB BLD-MCNC: 8.8 G/DL (ref 11.5–16)
HGB BLD-MCNC: NORMAL G/DL (ref 12–16)
IMM GRANULOCYTES # BLD AUTO: 0 K/UL (ref 0–0.04)
IMM GRANULOCYTES # BLD AUTO: NORMAL K/UL (ref 0–0.5)
IMM GRANULOCYTES NFR BLD AUTO: 0 % (ref 0–0.5)
IMM GRANULOCYTES NFR BLD AUTO: NORMAL % (ref 0–5)
LYMPHOCYTES # BLD: 0.5 K/UL (ref 0.8–3.5)
LYMPHOCYTES # BLD: NORMAL K/UL
LYMPHOCYTES NFR BLD: 41 % (ref 12–49)
LYMPHOCYTES NFR BLD: NORMAL % (ref 12–49)
M PROTEIN SERPL ELPH-MCNC: ABNORMAL G/DL
MCH RBC QN AUTO: 34.1 PG (ref 26–34)
MCH RBC QN AUTO: 34.3 PG (ref 26–34)
MCH RBC QN AUTO: NORMAL PG (ref 25–35)
MCHC RBC AUTO-ENTMCNC: 30.8 G/DL (ref 30–36.5)
MCHC RBC AUTO-ENTMCNC: 31 G/DL (ref 30–36.5)
MCHC RBC AUTO-ENTMCNC: NORMAL G/DL (ref 31–37)
MCV RBC AUTO: 110.5 FL (ref 80–99)
MCV RBC AUTO: 110.9 FL (ref 80–99)
MCV RBC AUTO: NORMAL FL (ref 78–102)
MONOCYTES # BLD: 0.4 K/UL (ref 0–1)
MONOCYTES # BLD: NORMAL K/UL
MONOCYTES NFR BLD: 35 % (ref 5–13)
MONOCYTES NFR BLD: NORMAL % (ref 5–13)
NEUTS SEG # BLD: 0.2 K/UL (ref 1.8–8)
NEUTS SEG # BLD: NORMAL K/UL
NEUTS SEG NFR BLD: 15 % (ref 32–75)
NEUTS SEG NFR BLD: NORMAL % (ref 42–75)
NRBC # BLD: 0.02 K/UL (ref 0–0.01)
NRBC # BLD: 0.02 K/UL (ref 0–0.01)
NRBC # BLD: NORMAL K/UL
NRBC BLD-RTO: 1.7 PER 100 WBC
NRBC BLD-RTO: 1.9 PER 100 WBC
NRBC BLD-RTO: NORMAL PER 100 WBC
PLATELET # BLD AUTO: 243 K/UL (ref 150–400)
PLATELET # BLD AUTO: 275 K/UL (ref 150–400)
PLATELET # BLD AUTO: NORMAL K/UL
PMV BLD AUTO: 10.9 FL (ref 8.9–12.9)
PMV BLD AUTO: 11 FL (ref 8.9–12.9)
PMV BLD AUTO: NORMAL FL (ref 7.4–10.4)
POTASSIUM SERPL-SCNC: 4.2 MMOL/L (ref 3.5–5.1)
PROT SERPL-MCNC: 4.3 G/DL (ref 6–8.5)
PROT SERPL-MCNC: 4.5 G/DL (ref 6.4–8.2)
RBC # BLD AUTO: 2.48 M/UL (ref 3.8–5.2)
RBC # BLD AUTO: 2.58 M/UL (ref 3.8–5.2)
RBC # BLD AUTO: NORMAL M/UL
RBC MORPH BLD: ABNORMAL
SAMPLES BEING HELD,HOLD: NORMAL
SODIUM SERPL-SCNC: 147 MMOL/L (ref 136–145)
WBC # BLD AUTO: 1.1 K/UL (ref 3.6–11)
WBC # BLD AUTO: 1.2 K/UL (ref 3.6–11)
WBC # BLD AUTO: NORMAL K/UL

## 2020-12-03 PROCEDURE — 85027 COMPLETE CBC AUTOMATED: CPT

## 2020-12-03 PROCEDURE — 99232 SBSQ HOSP IP/OBS MODERATE 35: CPT | Performed by: INTERNAL MEDICINE

## 2020-12-03 PROCEDURE — 77030038269 HC DRN EXT URIN PURWCK BARD -A

## 2020-12-03 PROCEDURE — 99232 SBSQ HOSP IP/OBS MODERATE 35: CPT | Performed by: NURSE PRACTITIONER

## 2020-12-03 PROCEDURE — 74011250636 HC RX REV CODE- 250/636: Performed by: INTERNAL MEDICINE

## 2020-12-03 PROCEDURE — 97110 THERAPEUTIC EXERCISES: CPT

## 2020-12-03 PROCEDURE — 97530 THERAPEUTIC ACTIVITIES: CPT

## 2020-12-03 PROCEDURE — 74011250637 HC RX REV CODE- 250/637: Performed by: NURSE PRACTITIONER

## 2020-12-03 PROCEDURE — 51798 US URINE CAPACITY MEASURE: CPT

## 2020-12-03 PROCEDURE — 97165 OT EVAL LOW COMPLEX 30 MIN: CPT

## 2020-12-03 PROCEDURE — 65660000000 HC RM CCU STEPDOWN

## 2020-12-03 PROCEDURE — 74011250636 HC RX REV CODE- 250/636: Performed by: FAMILY MEDICINE

## 2020-12-03 PROCEDURE — 85025 COMPLETE CBC W/AUTO DIFF WBC: CPT

## 2020-12-03 PROCEDURE — 74011250637 HC RX REV CODE- 250/637: Performed by: FAMILY MEDICINE

## 2020-12-03 PROCEDURE — 74011250637 HC RX REV CODE- 250/637: Performed by: INTERNAL MEDICINE

## 2020-12-03 PROCEDURE — 97535 SELF CARE MNGMENT TRAINING: CPT

## 2020-12-03 PROCEDURE — 80053 COMPREHEN METABOLIC PANEL: CPT

## 2020-12-03 PROCEDURE — 83036 HEMOGLOBIN GLYCOSYLATED A1C: CPT

## 2020-12-03 PROCEDURE — 74011250636 HC RX REV CODE- 250/636: Performed by: NURSE PRACTITIONER

## 2020-12-03 PROCEDURE — 2709999900 HC NON-CHARGEABLE SUPPLY

## 2020-12-03 PROCEDURE — 36415 COLL VENOUS BLD VENIPUNCTURE: CPT

## 2020-12-03 PROCEDURE — 74011000258 HC RX REV CODE- 258: Performed by: FAMILY MEDICINE

## 2020-12-03 RX ORDER — IBUPROFEN 400 MG/1
400 TABLET ORAL
Status: DISCONTINUED | OUTPATIENT
Start: 2020-12-03 | End: 2020-12-11 | Stop reason: HOSPADM

## 2020-12-03 RX ORDER — FAMOTIDINE 20 MG/1
20 TABLET, FILM COATED ORAL 2 TIMES DAILY
Status: DISCONTINUED | OUTPATIENT
Start: 2020-12-03 | End: 2020-12-11 | Stop reason: HOSPADM

## 2020-12-03 RX ORDER — ONDANSETRON 2 MG/ML
4 INJECTION INTRAMUSCULAR; INTRAVENOUS
Status: DISCONTINUED | OUTPATIENT
Start: 2020-12-03 | End: 2020-12-11 | Stop reason: HOSPADM

## 2020-12-03 RX ORDER — PREGABALIN 75 MG/1
150 CAPSULE ORAL 2 TIMES DAILY
Status: DISCONTINUED | OUTPATIENT
Start: 2020-12-03 | End: 2020-12-03

## 2020-12-03 RX ORDER — PREGABALIN 75 MG/1
75 CAPSULE ORAL 2 TIMES DAILY
Status: COMPLETED | OUTPATIENT
Start: 2020-12-03 | End: 2020-12-03

## 2020-12-03 RX ORDER — PREGABALIN 75 MG/1
150 CAPSULE ORAL 2 TIMES DAILY
Status: DISCONTINUED | OUTPATIENT
Start: 2020-12-04 | End: 2020-12-11 | Stop reason: HOSPADM

## 2020-12-03 RX ADMIN — Medication: at 08:53

## 2020-12-03 RX ADMIN — Medication 10 ML: at 06:52

## 2020-12-03 RX ADMIN — CEFEPIME HYDROCHLORIDE 2 G: 2 INJECTION, POWDER, FOR SOLUTION INTRAVENOUS at 10:48

## 2020-12-03 RX ADMIN — ONDANSETRON 4 MG: 2 INJECTION INTRAMUSCULAR; INTRAVENOUS at 21:05

## 2020-12-03 RX ADMIN — Medication 10 ML: at 21:06

## 2020-12-03 RX ADMIN — PHENAZOPYRIDINE HYDROCHLORIDE 100 MG: 100 TABLET ORAL at 09:03

## 2020-12-03 RX ADMIN — LACTULOSE 15 ML: 20 SOLUTION ORAL at 17:00

## 2020-12-03 RX ADMIN — LACTULOSE 15 ML: 20 SOLUTION ORAL at 12:41

## 2020-12-03 RX ADMIN — DOCUSATE SODIUM 100 MG: 100 CAPSULE, LIQUID FILLED ORAL at 08:52

## 2020-12-03 RX ADMIN — EPOETIN ALFA-EPBX 20000 UNITS: 10000 INJECTION, SOLUTION INTRAVENOUS; SUBCUTANEOUS at 21:05

## 2020-12-03 RX ADMIN — Medication: at 15:25

## 2020-12-03 RX ADMIN — ACETAMINOPHEN 650 MG: 325 TABLET ORAL at 15:25

## 2020-12-03 RX ADMIN — FAMOTIDINE 20 MG: 20 TABLET, FILM COATED ORAL at 08:52

## 2020-12-03 RX ADMIN — HEPARIN SODIUM 5000 UNITS: 5000 INJECTION INTRAVENOUS; SUBCUTANEOUS at 08:52

## 2020-12-03 RX ADMIN — ACETAMINOPHEN 650 MG: 325 TABLET ORAL at 10:48

## 2020-12-03 RX ADMIN — POTASSIUM CHLORIDE 20 MEQ: 750 TABLET, FILM COATED, EXTENDED RELEASE ORAL at 08:52

## 2020-12-03 RX ADMIN — IBUPROFEN 400 MG: 400 TABLET ORAL at 22:08

## 2020-12-03 RX ADMIN — CEFEPIME HYDROCHLORIDE 2 G: 2 INJECTION, POWDER, FOR SOLUTION INTRAVENOUS at 03:37

## 2020-12-03 RX ADMIN — DOCUSATE SODIUM 100 MG: 100 CAPSULE, LIQUID FILLED ORAL at 17:00

## 2020-12-03 RX ADMIN — Medication: at 21:06

## 2020-12-03 RX ADMIN — SODIUM CHLORIDE 75 ML/HR: 900 INJECTION, SOLUTION INTRAVENOUS at 22:14

## 2020-12-03 RX ADMIN — PREGABALIN 75 MG: 75 CAPSULE ORAL at 13:41

## 2020-12-03 RX ADMIN — TBO-FILGRASTIM 300 MCG: 480 INJECTION, SOLUTION SUBCUTANEOUS at 19:04

## 2020-12-03 RX ADMIN — PREGABALIN 75 MG: 75 CAPSULE ORAL at 21:05

## 2020-12-03 RX ADMIN — FAMOTIDINE 20 MG: 20 TABLET, FILM COATED ORAL at 17:05

## 2020-12-03 RX ADMIN — SODIUM CHLORIDE 75 ML/HR: 900 INJECTION, SOLUTION INTRAVENOUS at 03:38

## 2020-12-03 RX ADMIN — HEPARIN SODIUM 5000 UNITS: 5000 INJECTION INTRAVENOUS; SUBCUTANEOUS at 16:58

## 2020-12-03 RX ADMIN — CEFEPIME HYDROCHLORIDE 2 G: 2 INJECTION, POWDER, FOR SOLUTION INTRAVENOUS at 19:05

## 2020-12-03 NOTE — PROGRESS NOTES
Bedside and Verbal shift change report given to DEMI Abdalla (oncoming nurse) by Al Lombardi (offgoing nurse). Report included the following information SBAR, Kardex, MAR, Recent Results and Cardiac Rhythm NSR.

## 2020-12-03 NOTE — PROGRESS NOTES
Neurology Progress Note    Patient ID:  Vicenta Hendrix  [id number]  40 y.o.  1945        Subjective:   No acute events overnight. Unable to complete closed MRI of the brain, due to her being claustrophobic    Objective: All records in [facility] care reviewed and noted    ROS:  Per HPI  All other 12 pt ROS negative    Meds:  Current Facility-Administered Medications   Medication Dose Route Frequency    famotidine (PEPCID) tablet 20 mg  20 mg Oral DAILY    potassium chloride SR (KLOR-CON 10) tablet 20 mEq  20 mEq Oral BID    phenazopyridine (PYRIDIUM) tablet 100 mg  100 mg Oral TID PRN    balsam peru-castor oiL (VENELEX) ointment   Topical TID    epoetin annie-epbx (RETACRIT) injection 20,000 Units  20,000 Units SubCUTAneous Q7D    pregabalin (LYRICA) capsule 150 mg  150 mg Oral QHS    oxyCODONE IR (ROXICODONE) tablet 5 mg  5 mg Oral Q4H PRN    docusate sodium (COLACE) capsule 100 mg  100 mg Oral BID    sodium chloride (NS) flush 5-40 mL  5-40 mL IntraVENous Q8H    sodium chloride (NS) flush 5-40 mL  5-40 mL IntraVENous PRN    0.9% sodium chloride infusion  75 mL/hr IntraVENous CONTINUOUS    acetaminophen (TYLENOL) tablet 650 mg  650 mg Oral Q4H PRN    heparin (porcine) injection 5,000 Units  5,000 Units SubCUTAneous Q8H    cefepime (MAXIPIME) 2 g in 0.9% sodium chloride (MBP/ADV) 100 mL MBP  2 g IntraVENous Q8H       Imaging:  CT Results (most recent):  Results from Hospital Encounter encounter on 11/30/20   CT ABD PELV WO CONT    Narrative EXAM: CT CHEST WO CONT, CT ABD PELV WO CONT    INDICATION: neutropenic fever    COMPARISON: CT ABD and pelvis 8/15/2013 and chest x-ray 11/30/2020. TECHNIQUE:  5 mm axial images were obtained through the chest, abdomen, and  pelvis. Oral and IV contrast were not administered. Coronal and sagittal  reconstructions were generated.  CT dose reduction was achieved through use of a  standardized protocol tailored for this examination and automatic exposure  control for dose modulation. FINDINGS:    THYROID: No nodule. MEDIASTINUM: No mass or lymphadenopathy. RENITA: No mass or lymphadenopathy. THORACIC AORTA: Atherosclerotic calcification without aneurysm. MAIN PULMONARY ARTERY: Normal in caliber. TRACHEA/BRONCHI: Patent. ESOPHAGUS: No wall thickening or dilatation. HEART: The heart is normal in size without pericardial effusion. Coronary artery  calcifications are noted. PLEURA: Bilateral pleural effusions. No pneumothorax. LUNGS: Calcified granulomata. Dependent atelectasis overlying pleural effusions. Aerated lungs are otherwise clear. LIVER: No mass or biliary dilation. GALLBLADDER: Unremarkable. SPLEEN: Calcified granulomata. Otherwise unremarkable. PANCREAS: No mass or ductal dilation. ADRENALS: Unremarkable. KIDNEYS: Early staghorn calculus lower pole collecting system left kidney  measures 1.8 x 1.1 cm. Right renal pelvis calculus measures 7 x 10 mm. No  hydronephrosis, mass, or other abnormality. No ureteral dilation. STOMACH: Unremarkable. SMALL BOWEL: No dilatation or wall thickening. COLON: Noninflamed appearing sigmoid colon diverticula. Moderate rectal fecal  retention. No dilation or wall thickening. APPENDIX: Unremarkable. PERITONEUM: No ascites or pneumoperitoneum. RETROPERITONEUM: Atherosclerotic calcination without aneurysm. No enlarged  lymphadenopathy. REPRODUCTIVE ORGANS: Uterus is surgically absent. Right ovary is not seen and is  likely surgically absent. Probable atrophic left ovary seen which otherwise  appears unremarkable. URINARY BLADDER: Small nondependent intraluminal bubble of air. Otherwise  unremarkable with no mass or calculus. BONES: Posterior too and screw spinal hardware traversing from the upper lumbar  spine to the sacrum. No acute fracture or aggressive lesion. ADDITIONAL COMMENTS: N/A      Impression IMPRESSION:  1. Bilateral pleural effusions with overlying atelectasis.   2. Coronary artery disease  3. Nonobstructed bilateral nephrolithiasis. 4. Moderate rectal fecal retention. 5. Additional incidentals as above. Lab Review   Recent Results (from the past 24 hour(s))   CBC WITH AUTOMATED DIFF    Collection Time: 12/03/20  3:48 AM   Result Value Ref Range    WBC  K/uL     SPECIMEN INTEGRITY QUESTIONABLE. SUGGEST RECOLLECTION    RBC  M/uL     SPECIMEN INTEGRITY QUESTIONABLE. SUGGEST RECOLLECTION    HGB  12.0 - 16.0 g/dL     SPECIMEN INTEGRITY QUESTIONABLE. SUGGEST RECOLLECTION    HCT  %     SPECIMEN INTEGRITY QUESTIONABLE. SUGGEST RECOLLECTION    MCV  78.0 - 102.0 FL     SPECIMEN INTEGRITY QUESTIONABLE. SUGGEST RECOLLECTION    MCH  25.0 - 35.0 PG     SPECIMEN INTEGRITY QUESTIONABLE. SUGGEST RECOLLECTION    MCHC  31.0 - 37.0 g/dL     SPECIMEN INTEGRITY QUESTIONABLE. SUGGEST RECOLLECTION    RDW  11.5 - 14.5 %     SPECIMEN INTEGRITY QUESTIONABLE. SUGGEST RECOLLECTION    PLATELET  K/uL     SPECIMEN INTEGRITY QUESTIONABLE. SUGGEST RECOLLECTION    MPV  7.4 - 10.4 FL     SPECIMEN INTEGRITY QUESTIONABLE. SUGGEST RECOLLECTION    NRBC  0  WBC     SPECIMEN INTEGRITY QUESTIONABLE. SUGGEST RECOLLECTION    ABSOLUTE NRBC  K/uL     SPECIMEN INTEGRITY QUESTIONABLE. SUGGEST RECOLLECTION    NEUTROPHILS  42 - 75 %     SPECIMEN INTEGRITY QUESTIONABLE. SUGGEST RECOLLECTION    LYMPHOCYTES  12 - 49 %     SPECIMEN INTEGRITY QUESTIONABLE. SUGGEST RECOLLECTION    MONOCYTES  5 - 13 %     SPECIMEN INTEGRITY QUESTIONABLE. SUGGEST RECOLLECTION    EOSINOPHILS  0 - 5 %     SPECIMEN INTEGRITY QUESTIONABLE. SUGGEST RECOLLECTION    BASOPHILS  0 - 1 %     SPECIMEN INTEGRITY QUESTIONABLE. SUGGEST RECOLLECTION    IMMATURE GRANULOCYTES  0.0 - 5.0 %     SPECIMEN INTEGRITY QUESTIONABLE. SUGGEST RECOLLECTION    ABS. NEUTROPHILS  K/UL     SPECIMEN INTEGRITY QUESTIONABLE. SUGGEST RECOLLECTION    ABS. LYMPHOCYTES  K/UL     SPECIMEN INTEGRITY QUESTIONABLE. SUGGEST RECOLLECTION    ABS.  MONOCYTES  K/UL     SPECIMEN INTEGRITY QUESTIONABLE. SUGGEST RECOLLECTION    ABS. EOSINOPHILS  K/UL     SPECIMEN INTEGRITY QUESTIONABLE. SUGGEST RECOLLECTION    ABS. BASOPHILS  K/UL     SPECIMEN INTEGRITY QUESTIONABLE. SUGGEST RECOLLECTION    ABS. IMM. GRANS.  0.0 - 0.5 K/UL     SPECIMEN INTEGRITY QUESTIONABLE. SUGGEST RECOLLECTION    DF        SPECIMEN INTEGRITY QUESTIONABLE. SUGGEST RECOLLECTION   METABOLIC PANEL, COMPREHENSIVE    Collection Time: 12/03/20  3:48 AM   Result Value Ref Range    Sodium 147 (H) 136 - 145 mmol/L    Potassium 4.2 3.5 - 5.1 mmol/L    Chloride 119 (H) 97 - 108 mmol/L    CO2 21 21 - 32 mmol/L    Anion gap 7 5 - 15 mmol/L    Glucose 83 65 - 100 mg/dL    BUN 24 (H) 6 - 20 MG/DL    Creatinine 0.48 (L) 0.55 - 1.02 MG/DL    BUN/Creatinine ratio 50 (H) 12 - 20      GFR est AA >60 >60 ml/min/1.73m2    GFR est non-AA >60 >60 ml/min/1.73m2    Calcium 8.3 (L) 8.5 - 10.1 MG/DL    Bilirubin, total 0.4 0.2 - 1.0 MG/DL    ALT (SGPT) 12 12 - 78 U/L    AST (SGOT) 10 (L) 15 - 37 U/L    Alk. phosphatase 58 45 - 117 U/L    Protein, total 4.5 (L) 6.4 - 8.2 g/dL    Albumin 1.6 (L) 3.5 - 5.0 g/dL    Globulin 2.9 2.0 - 4.0 g/dL    A-G Ratio 0.6 (L) 1.1 - 2.2     CBC WITH AUTOMATED DIFF    Collection Time: 12/03/20  6:16 AM   Result Value Ref Range    WBC 1.2 (L) 3.6 - 11.0 K/uL    RBC 2.48 (L) 3.80 - 5.20 M/uL    HGB 8.5 (L) 11.5 - 16.0 g/dL    HCT 27.4 (L) 35.0 - 47.0 %    .5 (H) 80.0 - 99.0 FL    MCH 34.3 (H) 26.0 - 34.0 PG    MCHC 31.0 30.0 - 36.5 g/dL    RDW 22.8 (H) 11.5 - 14.5 %    PLATELET 681 603 - 655 K/uL    MPV 11.0 8.9 - 12.9 FL    NRBC 1.7 (H) 0  WBC    ABSOLUTE NRBC 0.02 (H) 0.00 - 0.01 K/uL    NEUTROPHILS PENDING %    LYMPHOCYTES PENDING %    MONOCYTES PENDING %    EOSINOPHILS PENDING %    BASOPHILS PENDING %    IMMATURE GRANULOCYTES PENDING %    ABS. NEUTROPHILS PENDING K/UL    ABS. LYMPHOCYTES PENDING K/UL    ABS. MONOCYTES PENDING K/UL    ABS. EOSINOPHILS PENDING K/UL    ABS. BASOPHILS PENDING K/UL    ABS. IMM. GRANS. PENDING K/UL    DF PENDING    SAMPLES BEING HELD    Collection Time: 12/03/20  6:16 AM   Result Value Ref Range    SAMPLES BEING HELD  1 TALL PST     COMMENT        Add-on orders for these samples will be processed based on acceptable specimen integrity and analyte stability, which may vary by analyte. Exam:  Visit Vitals  BP (!) 152/74 (BP 1 Location: Right arm, BP Patient Position: At rest)   Pulse 99   Temp 98.3 °F (36.8 °C)   Resp 16   Ht 5' 4.02\" (1.626 m)   Wt 145 lb 11.2 oz (66.1 kg)   SpO2 93%   BMI 25.00 kg/m²     Gen: Well developed  CV: RRR  Neuro: A&O x 3, no dysarthria or aphasia  CN II-XII: PERRL, EOMI, face symmetric, tongue/palate midline  Motor:    Motor:                              RIGHT  LEFT   Elbow Flexion 2-/5 3-/5   Elbow extension 3-/5 4-/5    3/5 3/5   Hip Flexion 4/5 4-/5   Hip Extension 4/5 4/5   Knee Flexion 3/5 4-/5   Knee Extension 4-/5 3/5   Foot plantar flexion 1/5 1/5   Foot dorsiflexion 1/5 1/5    wrist flexion 0/5 Wrist extension 1/5            Sensory: intact to LT  Gait:deferred     Assessment:     Patient Active Problem List   Diagnosis Code    SOB (shortness of breath) R06.02    Disorientation R41.0    Chronic UTI (urinary tract infection) N39.0    PNA (pneumonia) J18.9    Acute metabolic encephalopathy Y62.40    MDS ([facility]elodysplastic syndrome) ([facility]) D46.9    Neutropenic fever ([facility]) D70.9, R50.81    Chronic anemia D64.9    Fall W19. Rima [place]    Severe sepsis ([facility] [address] A41.9, R65.20     Plan:   Chronic bilateral upper and lower extremity weakness.   -Patient agreed to complete workup outpatient    - MRI of the brain and C-spine can be done outpatient with open MRI, Please  discharge patient with orders    -EMG should be schedule outpatient.    -neuropathy still pending. So for unremarkable      Thank you for allowing the Neurology Service the pleasure of participating in the care of your patient.   This patient will be discussed with my collaborating care team physician  and he may have further recommendations regarding this patient's care.      Signed:  Daniela Del Angel NP  12/3/2020  8:17 AM

## 2020-12-03 NOTE — PROGRESS NOTES
12/3/2020 -   TORSTEN:  - RUR: 14%  - Disposition is TBD   - Patient will need IM Letter prior to discharge    - Cultures pending  - ABX continue  - Treatment for stones is pending patient decision  - Patient refused MRI 12/2  - Neuro input pending  - OT Consult pending    CM notes rehab recommendation from PT. CM to await OT input and will discuss disposition planning with attending and patient/family.   CRM: Fabiana Rojas, AMBAR, [address] [place] [id number] [place]; Z: [phone]

## 2020-12-03 NOTE — PROGRESS NOTES
Bedside and Verbal shift change report given to Pedrito Shearer (oncoming nurse) by Felix Al RN (offgoing nurse). Report included the following information SBAR, Kardex, Intake/Output, MAR, Recent Results and Cardiac Rhythm NSR.

## 2020-12-03 NOTE — PROGRESS NOTES
Problem: Mobility Impaired (Adult and Pediatric)  Goal: *Acute Goals and Plan of Care (Insert Text)  Description: FUNCTIONAL STATUS PRIOR TO ADMISSION: Patient was modified independent using a rolling walker for functional mobility. HOME SUPPORT PRIOR TO ADMISSION: The patient lived with  but did not require assist.    Physical Therapy Goals  Initiated 12/2/2020  1. Patient will move from supine to sit and sit to supine  in bed with supervision/set-up within 7 day(s). 2.  Patient will transfer from bed to chair and chair to bed with minimal assistance/contact guard assist using the least restrictive device within 7 day(s). 3.  Patient will perform sit to stand with minimal assistance/contact guard assist within 7 day(s). 4.  Patient will ambulate with minimal assistance/contact guard assist for 10 feet with the least restrictive device within 7 day(s). Outcome: Progressing Towards Goal   PHYSICAL THERAPY TREATMENT  Patient: Alfonso Hernández (55 y.o. female)  Date: 12/3/2020  Diagnosis: SOB (shortness of breath) [R06.02]  SOB (shortness of breath) [R06.02]   PNA (pneumonia)       Precautions:  fall, skin  Chart, physical therapy assessment, plan of care and goals were reviewed. ASSESSMENT  Patient continues with skilled PT services and is slowly progressing towards goals. She is limited by global weakness and rectal pain. On my first attempt to work with pt she was getting cleaned up by the OT and declined to work with me at that time, had just sat at the EOB and was being cleaned up from a BM. She was about to eat her lunch when I arrived. I returned later and she participated in LE ex, see below in the body of this note. Given that she is at increased risk for heel breakdown, I provided her with bed boots. She remains far from her baseline and continue to recommend rehab. Current Level of Function Impacting Discharge (mobility/balance):        Other factors to consider for discharge: medically complex, significant rectal pain limiting participation in PT session. PLAN :  Patient continues to benefit from skilled intervention to address the above impairments. Continue treatment per established plan of care. to address goals. Recommendation for discharge: (in order for the patient to meet his/her long term goals)  Working towards tolerating Therapy 3 hours per day 5-7 days per week, if not that then SNF for rehab, if not that then home with assist with all of mobility and DME. This discharge recommendation:  A follow-up discussion with the attending provider and/or case management is planned    IF patient discharges home will need the following DME: mechanical lift and wheelchair if she were to discharge to home in present mobility state       SUBJECTIVE:   Patient stated that the pain in her rectum was very painful but she was willing to participate in some LE ex    OBJECTIVE DATA SUMMARY:   Chart checked, pt cleared by nursing  Critical Behavior:  Neurologic State: Alert  Orientation Level: Oriented X4  Cognition: Follows commands, Decreased attention/concentration  Safety/Judgement: Awareness of environment, Fall prevention, Home safety  Functional Mobility Training:  Bed Mobility:  Rolling: Minimum assistance        Transfers:                                   Balance:                                                        Stairs: Therapeutic Exercises:   10 reps bilaterally following ex with assist prn: heel slides, ankle pumps, quad sets, glut sets, and hip abd/add  Pain Ratin-8 in her rectum    Activity Tolerance:   Fair, limited by pain    After treatment patient left in no apparent distress:   Supine in bed, Call bell within reach, and wearing bed boots bilaterally    COMMUNICATION/COLLABORATION:   The patients plan of care was discussed with: Occupational therapist and Registered nurse.      Charmaine Dotson   Time Calculation: 29 mins

## 2020-12-03 NOTE — PROGRESS NOTES
Renal Dosing/Monitoring  Medication: famotidine  Current regimen:  20 mg every 24 hr  Recent Labs     12/03/20  0348 12/02/20  0415 12/01/20  0422   CREA 0.48* 0.43* 0.38*   BUN 24* 21* 18     Estimated CrCl:  ~65-70 ml/min  Plan: Change to famotidine 20 mg BID for est CrCl >50 ml/min

## 2020-12-03 NOTE — PROGRESS NOTES
Hematology-Oncology Progress Note      Lyle Search  1945  [id number]  12/3/2020      Subjective:     Weak, c/o constipation and mild nausea  Allergies: Fentanyl; Celebrex [celecoxib];  Duloxetine; and Sulfa (sulfonamide antibiotics)  Current Facility-Administered Medications   Medication Dose Route Frequency Provider Last Rate Last Dose    famotidine (PEPCID) tablet 20 mg  20 mg Oral BID Whitney Magdaleno NP        tbo-filgrastim [facility]) injection 300 mcg  300 mcg SubCUTAneous DAILY Jeffy Bolanos MD        lactulose (CHRONULAC) 10 gram/15 mL solution 15 mL  10 g Oral BID Jeffy Bolanos MD        phenazopyridine (PYRIDIUM) tablet 100 mg  100 mg Oral TID PRN Génesis Fitzgerald NP   100 mg at 12/03/20 1376    balsam peru-castor oiL (VENELEX) ointment   Topical TID Nasima Graham MD        epoetin annie-epbx (RETACRIT) injection 20,000 Units  20,000 Units SubCUTAneous Q7D Brooke Glover MD        pregabalin (LYRICA) capsule 150 mg  150 mg Oral QHS Nasima Graham MD   150 mg at 12/02/20 2105    oxyCODONE IR (ROXICODONE) tablet 5 mg  5 mg Oral Q4H PRN Gray Barker NP   5 mg at 12/01/20 2145    docusate sodium (COLACE) capsule 100 mg  100 mg Oral BID Kristy Verduzco MD   100 mg at 12/03/20 0852    sodium chloride (NS) flush 5-40 mL  5-40 mL IntraVENous Q8H Kristy Verduzco MD   10 mL at 12/03/20 0636    sodium chloride (NS) flush 5-40 mL  5-40 mL IntraVENous PRN Kristy Verduzco MD        0.9% sodium chloride infusion  75 mL/hr IntraVENous CONTINUOUS Kristy Verduzco MD 75 mL/hr at 12/03/20 0338 75 mL/hr at 12/03/20 0338    acetaminophen (TYLENOL) tablet 650 mg  650 mg Oral Q4H PRN Kristy Verduzco MD   650 mg at 12/03/20 1048    heparin (porcine) injection 5,000 Units  5,000 Units SubCUTAneous Q8H Kristy Verduzco MD   5,000 Units at 12/03/20 0852    cefepime (MAXIPIME) 2 g in 0.9% sodium chloride (MBP/ADV) 100 mL MBP  2 g IntraVENous Q8H Kristy Verduzco  mL/hr at 12/03/20 1048 2 g at 12/03/20 1048     Objective:     Patient Vitals for the past 24 hrs:   BP Temp Pulse Resp SpO2 Weight   12/03/20 0718 (!) 152/74 98.3 °F (36.8 °C) 99 16 93 %    12/03/20 0342 (!) 143/66 97.7 °F (36.5 °C) 83 18 95 % 66.1 kg (145 lb 11.2 oz)   12/03/20 0019      66.1 kg (145 lb 11.6 oz)   12/02/20 2308 (!) 128/56 98.2 °F (36.8 °C) 91 18 91 %    12/02/20 1915 (!) 140/77 98 °F (36.7 °C) (!) 103 18 92 %    12/02/20 1533 (!) 125/59 98.3 °F (36.8 °C) 100 18 91 %    12/02/20 1127 (!) 146/70 97.9 °F (36.6 °C) 83 18 98 %        Gen: NAD  HEENT: PERRL, Sclerae anicteric  Cv: RRR without m/r/g  Pulm: aerating well bilat  Abd: NABS, NTND, No HSM  Ext: No c/c/e    Available labs reviewed:  Labs:    Recent Results (from the past 24 hour(s))   CBC WITH AUTOMATED DIFF    Collection Time: 12/03/20  3:48 AM   Result Value Ref Range    WBC  K/uL     SPECIMEN INTEGRITY QUESTIONABLE. SUGGEST RECOLLECTION    RBC  M/uL     SPECIMEN INTEGRITY QUESTIONABLE. SUGGEST RECOLLECTION    HGB  12.0 - 16.0 g/dL     SPECIMEN INTEGRITY QUESTIONABLE. SUGGEST RECOLLECTION    HCT  %     SPECIMEN INTEGRITY QUESTIONABLE. SUGGEST RECOLLECTION    MCV  78.0 - 102.0 FL     SPECIMEN INTEGRITY QUESTIONABLE. SUGGEST RECOLLECTION    MCH  25.0 - 35.0 PG     SPECIMEN INTEGRITY QUESTIONABLE. SUGGEST RECOLLECTION    MCHC  31.0 - 37.0 g/dL     SPECIMEN INTEGRITY QUESTIONABLE. SUGGEST RECOLLECTION    RDW  11.5 - 14.5 %     SPECIMEN INTEGRITY QUESTIONABLE. SUGGEST RECOLLECTION    PLATELET  K/uL     SPECIMEN INTEGRITY QUESTIONABLE. SUGGEST RECOLLECTION    MPV  7.4 - 10.4 FL     SPECIMEN INTEGRITY QUESTIONABLE. SUGGEST RECOLLECTION    NRBC  0  WBC     SPECIMEN INTEGRITY QUESTIONABLE. SUGGEST RECOLLECTION    ABSOLUTE NRBC  K/uL     SPECIMEN INTEGRITY QUESTIONABLE. SUGGEST RECOLLECTION    NEUTROPHILS  42 - 75 %     SPECIMEN INTEGRITY QUESTIONABLE. SUGGEST RECOLLECTION    LYMPHOCYTES  12 - 49 %     SPECIMEN INTEGRITY QUESTIONABLE.  SUGGEST RECOLLECTION MONOCYTES  5 - 13 %     SPECIMEN INTEGRITY QUESTIONABLE. SUGGEST RECOLLECTION    EOSINOPHILS  0 - 5 %     SPECIMEN INTEGRITY QUESTIONABLE. SUGGEST RECOLLECTION    BASOPHILS  0 - 1 %     SPECIMEN INTEGRITY QUESTIONABLE. SUGGEST RECOLLECTION    IMMATURE GRANULOCYTES  0.0 - 5.0 %     SPECIMEN INTEGRITY QUESTIONABLE. SUGGEST RECOLLECTION    ABS. NEUTROPHILS  K/UL     SPECIMEN INTEGRITY QUESTIONABLE. SUGGEST RECOLLECTION    ABS. LYMPHOCYTES  K/UL     SPECIMEN INTEGRITY QUESTIONABLE. SUGGEST RECOLLECTION    ABS. MONOCYTES  K/UL     SPECIMEN INTEGRITY QUESTIONABLE. SUGGEST RECOLLECTION    ABS. EOSINOPHILS  K/UL     SPECIMEN INTEGRITY QUESTIONABLE. SUGGEST RECOLLECTION    ABS. BASOPHILS  K/UL     SPECIMEN INTEGRITY QUESTIONABLE. SUGGEST RECOLLECTION    ABS. IMM. GRANS.  0.0 - 0.5 K/UL     SPECIMEN INTEGRITY QUESTIONABLE. SUGGEST RECOLLECTION    DF        SPECIMEN INTEGRITY QUESTIONABLE. SUGGEST RECOLLECTION   METABOLIC PANEL, COMPREHENSIVE    Collection Time: 12/03/20  3:48 AM   Result Value Ref Range    Sodium 147 (H) 136 - 145 mmol/L    Potassium 4.2 3.5 - 5.1 mmol/L    Chloride 119 (H) 97 - 108 mmol/L    CO2 21 21 - 32 mmol/L    Anion gap 7 5 - 15 mmol/L    Glucose 83 65 - 100 mg/dL    BUN 24 (H) 6 - 20 MG/DL    Creatinine 0.48 (L) 0.55 - 1.02 MG/DL    BUN/Creatinine ratio 50 (H) 12 - 20      GFR est AA >60 >60 ml/min/1.73m2    GFR est non-AA >60 >60 ml/min/1.73m2    Calcium 8.3 (L) 8.5 - 10.1 MG/DL    Bilirubin, total 0.4 0.2 - 1.0 MG/DL    ALT (SGPT) 12 12 - 78 U/L    AST (SGOT) 10 (L) 15 - 37 U/L    Alk.  phosphatase 58 45 - 117 U/L    Protein, total 4.5 (L) 6.4 - 8.2 g/dL    Albumin 1.6 (L) 3.5 - 5.0 g/dL    Globulin 2.9 2.0 - 4.0 g/dL    A-G Ratio 0.6 (L) 1.1 - 2.2     CBC WITH AUTOMATED DIFF    Collection Time: 12/03/20  6:16 AM   Result Value Ref Range    WBC 1.2 (L) 3.6 - 11.0 K/uL    RBC 2.48 (L) 3.80 - 5.20 M/uL    HGB 8.5 (L) 11.5 - 16.0 g/dL    HCT 27.4 (L) 35.0 - 47.0 %    .5 (H) 80.0 - 99.0 FL    MCH 34. 3 (H) 26.0 - 34.0 PG    MCHC 31.0 30.0 - 36.5 g/dL    RDW 22.8 (H) 11.5 - 14.5 %    PLATELET 738 175 - 668 K/uL    MPV 11.0 8.9 - 12.9 FL    NRBC 1.7 (H) 0  WBC    ABSOLUTE NRBC 0.02 (H) 0.00 - 0.01 K/uL    NEUTROPHILS 15 (L) 32 - 75 %    LYMPHOCYTES 41 12 - 49 %    MONOCYTES 35 (H) 5 - 13 %    EOSINOPHILS 6 0 - 7 %    BASOPHILS 3 (H) 0 - 1 %    IMMATURE GRANULOCYTES 0 0.0 - 0.5 %    ABS. NEUTROPHILS 0.2 (L) 1.8 - 8.0 K/UL    ABS. LYMPHOCYTES 0.5 (L) 0.8 - 3.5 K/UL    ABS. MONOCYTES 0.4 0.0 - 1.0 K/UL    ABS. EOSINOPHILS 0.1 0.0 - 0.4 K/UL    ABS. BASOPHILS 0.0 0.0 - 0.1 K/UL    ABS. IMM. GRANS. 0.0 0.00 - 0.04 K/UL    DF SMEAR SCANNED      RBC COMMENTS ANISOCYTOSIS  1+       SAMPLES BEING HELD    Collection Time: 12/03/20  6:16 AM   Result Value Ref Range    SAMPLES BEING HELD  1 TALL PST     COMMENT        Add-on orders for these samples will be processed based on acceptable specimen integrity and analyte stability, which may vary by analyte. Assessment and Plan     71 y/o woman with MDS (MLD-RS, low risk) on outpatient procrit/luspatercept, now admitted with questionable UTI and confusion. 1. Confusion: seen by Neuro, MRI brain ordered and pending. CT normal. I suspect this is related to an occult infection. Seen by ID, on broad spectrum Abx.     2. MDS:she has responded well to grannix in the out patient setting nicely on three occasions. Reid Pantoja given the UTI and severe neutropenia I will start that drug today. 3. Bladder outlet obsruction:Dr. Unique Kay saw her and offered lithotripsy for non obstructing stones. . she is considering this    4. Abd. Pain/bloating. . this is a chronic concern of hers. .. I had offered w/u with CT in the office in October but she wanted [facility] urology to work this up. CT done this admit.  Shows non obstructing renal calculi,.,, no ascites or sign of malignancy    Jing Saenz MD

## 2020-12-03 NOTE — PROGRESS NOTES
Hospitalist Progress Note    NAME: Antwan Stafford   :  1945   MRN:  [id number]       Assessment / Plan:  LLL PNA: neutropenic fever, bone marrow dysfunction that was attributed to Bactrim. -Continue telemetry monitor  -IVF's, continue IV fluid, low urine output today  -SARS-CoV-2: negative  -oxygen support, pulse ox monitoring  -pro-Vlad level, 0.11, on 2020  -CT chest: Bilateral pleural effusions with overlying atelectasis. Coronary artery disease. Nonobstructed bilateral nephrolithiasis. Moderate rectal fecal retention. -ID following, appreciated recommendation  Continue cefepime. Final urine culture pending     Acute metabolic encephalopathy: Improved, due to above. -CT head: Indeterminate hyperdensity just above the sella turcica posteriorly. Contrast-enhanced MRI of the brain for further delineation recommended, findings consistent with mild chronic microvascular ischemic change, moderate to severe temporal predominant degree of cerebral atrophy.  -Neuro following: MRI C spine, Brain  -IV ABT: Continue cefepime  -IVF's  -neurovascular checks  -MRI brain: pending  -MRI C spine: r/o cord compression-pending  -TSH: 1.64  -B12: 2,000.   -fall precautions  -aspiration precautions, symptoms persist may consider further intervention and diagnostics,  final urine culture pending  -BCNGTD, MRSA- negative     Neutropenic Fever:  No fever last 24 hours  -monitor CBC  -Heme-onc consult:  Appreciated recommendation     Myelodysplastic syndrome: Appreciated recommendation,  Anemia: Chronic vs Acute, no obvious signs of bleeding.  -monitor CBC  -Heme-onc consult     Chronic Leukopenia/ Neutropenia: following adverse reaction to sulfa drug. Heme/Onc. Chronic UTI's, Hx Nephrolithiasis and Bladder Obstruction:  Urology following  Acute Hypoxic Respiratory Failure: Resolved, on room oxygen  -ABG stable  -plan as above     Constipation:  Patient has bowel movement now.   POA Left lower leg, abrasion from fall: 5 x 3 x 0.1 cm, 100% pink, no exudate, no odor. Periwound intact from erythema.  -daily cleanse with saline; apply Xeroform and dry dressing.  -Sacrum, buttocks and heels: Venelex TID.     DVTppx: Heparin  Code Status: Full Code  Diet: Cardiac  Activity: OOB to chair TID and PRN  Discharge: TBD     Subjective:     Chief Complaint / Reason for Physician Visit  \"Not feeling well, being nauseous abdominal discomfort. Generalized pain\". Discussed with RN events overnight. Review of Systems:  Symptom Y/N Comments  Symptom Y/N Comments   Fever/Chills    Chest Pain     Poor Appetite    Edema     Cough    Abdominal Pain     Sputum    Joint Pain     SOB/HALL    Pruritis/Rash     Nausea/vomit    Tolerating PT/OT     Diarrhea    Tolerating Diet     Constipation    Other       Could NOT obtain due to:      Objective:     VITALS:   Last 24hrs VS reviewed since prior progress note. Most recent are:  Patient Vitals for the past 24 hrs:   Temp Pulse Resp BP SpO2   12/03/20 1103 98.1 °F (36.7 °C) 96 16 (!) 148/75 93 %   12/03/20 0718 98.3 °F (36.8 °C) 99 16 (!) 152/74 93 %   12/03/20 0342 97.7 °F (36.5 °C) 83 18 (!) 143/66 95 %   12/02/20 2308 98.2 °F (36.8 °C) 91 18 (!) 128/56 91 %   12/02/20 1915 98 °F (36.7 °C) (!) 103 18 (!) 140/77 92 %   12/02/20 1533 98.3 °F (36.8 °C) 100 18 (!) 125/59 91 %       Intake/Output Summary (Last 24 hours) at 12/3/2020 1258  Last data filed at 12/3/2020 1008  Gross per 24 hour   Intake 100 ml   Output 700 ml   Net -600 ml        I had a face to face encounter and independently examined this patient on 12/3/2020, as outlined below:  PHYSICAL EXAM:  General: Ill looking patient, frail. Alert, cooperative, no acute distress    EENT:  EOMI. Anicteric sclerae. MMM  Resp:  CTA bilaterally, no wheezing or rales. No accessory muscle use  CV:  Regular  rhythm,  No edema  GI:  Soft, Non distended, Non tender.   +Bowel sounds  Neurologic:  Alert and oriented X 3, normal speech,   Psych:   Good insight. Not anxious nor agitated  Skin:  No rashes. No jaundice    Reviewed most current lab test results and cultures  YES  Reviewed most current radiology test results   YES  Review and summation of old records today    NO  Reviewed patient's current orders and MAR    YES  PMH/SH reviewed - no change compared to H&P  ________________________________________________________________________  Care Plan discussed with:    Comments   Patient     Family      RN     Care Manager     Consultant                        Multidiciplinary team rounds were held today with , nursing, pharmacist and clinical coordinator. Patient's plan of care was discussed; medications were reviewed and discharge planning was addressed. ________________________________________________________________________  Total NON critical care TIME: 40 minutes    Total CRITICAL CARE TIME Spent:   Minutes non procedure based      Comments   >50% of visit spent in counseling and coordination of care     ________________________________________________________________________  Humble Owen MD     Procedures: see electronic medical records for all procedures/Xrays and details which were not copied into this note but were reviewed prior to creation of Plan. LABS:  I reviewed today's most current labs and imaging studies. Pertinent labs include:  Recent Labs     12/03/20  0616 12/03/20  0348 12/02/20  0415   WBC 1.2* SPECIMEN INTEGRITY QUESTIONABLE. SUGGEST RECOLLECTION 1.3*   HGB 8.5* SPECIMEN INTEGRITY QUESTIONABLE. SUGGEST RECOLLECTION 8.5*   HCT 27.4* SPECIMEN INTEGRITY QUESTIONABLE. SUGGEST RECOLLECTION 27.2*    SPECIMEN INTEGRITY QUESTIONABLE.  SUGGEST RECOLLECTION 242     Recent Labs     12/03/20  0348 12/02/20  0415 12/01/20  0422   * 147* 147*   K 4.2 3.2* 3.6   * 113* 112*   CO2 21 25 26   GLU 83 94 104*   BUN 24* 21* 18   CREA 0.48* 0.43* 0.38*   CA 8.3* 8.7 8.2*   ALB 1.6* 1.8* 1.9*   TBILI 0.4 0.4 0.5 ALT 12 12 14       Signed: Ld Yang MD

## 2020-12-03 NOTE — PROGRESS NOTES
[facility] Progress Note           Kamryn Crowe DO    [phone] Office  [phone]  Fax    12/3/2020      Assessment & Plan:   · GNR UTI. UA bland but will need to treat in setting of fever and neutropenia. CT reveals nephrolithiasis but no obstruction. Blood cultures sterile to date. Continue cefepime. Urology following for nephrolithiasis which will be addressed in the outpatient setting. Narrow antibiotics to cefepime. · Fever. Likely due to above. No further elevated temperatures since admission. · Myelodysplastic syndrome with chronic leukopenia/neutropenia. Oncology following. Patient to get Granix  · AMS. Likely related to infection. Improving. Neurology following  · Sulfa allergy          Subjective:     Sleeping at time of examination    Objective:     Vitals:   Visit Vitals  /63 (BP 1 Location: Right arm, BP Patient Position: At rest)   Pulse 85   Temp 98.1 °F (36.7 °C)   Resp 18   Ht 5' 4.02\" (1.626 m)   Wt 145 lb 11.2 oz (66.1 kg)   SpO2 94%   BMI 25.00 kg/m²        Tmax:  Temp (24hrs), Av.1 °F (36.7 °C), Min:97.7 °F (36.5 °C), Max:98.3 °F (36.8 °C)      Exam:   Patient is intubated:  no    Physical Examination:   General:   NAD   Head:  Normocephalic, atraumatic. Eyes:     Neck: Supple       Lungs:   No distress.   Clear to auscultation anteriorly   Chest wall:     Heart:  Regular rate and rhythm   Abdomen:   Soft, non-tender, non-distended   Extremities:    Skin:  No rash   Neurologic:  Unable to assess     Labs:        No lab exists for component: ITNL   Recent Labs     20   TROIQ <0.05     Recent Labs     20  1231 20  0616 20  0348 20  0415 20  0422   NA  --   --  147* 147* 147*   K  --   --  4.2 3.2* 3.6   CL  --   --  119* 113* 112*   CO2  --   --  21 25 26   BUN  --   --  24* 21* 18   CREA  --   --  0.48* 0.43* 0.38*   GLU  --   --  83 94 104*   ALB  --   --  1.6* 1.8* 1.9*   WBC 1.1* 1.2* SPECIMEN INTEGRITY QUESTIONABLE. SUGGEST RECOLLECTION 1.3* 1.5*   HGB 8.8* 8.5* SPECIMEN INTEGRITY QUESTIONABLE. SUGGEST RECOLLECTION 8.5* 7.2*   HCT 28.6* 27.4* SPECIMEN INTEGRITY QUESTIONABLE. SUGGEST RECOLLECTION 27.2* 23.9*    275 SPECIMEN INTEGRITY QUESTIONABLE. SUGGEST RECOLLECTION 242 216     No results for input(s): INR, PTP, APTT, INREXT, INREXT in the last 72 hours. Needs: urine analysis, urine sodium, protein and creatinine  No results found for: TANISHA, CREAU      Cultures:     Lab Results   Component Value Date/Time    Specimen Description: URINE 04/23/2012 07:45 PM     Lab Results   Component Value Date/Time    Culture result: MRSA NOT PRESENT 12/01/2020 02:27 PM    Culture result:  12/01/2020 02:27 PM         Screening of patient nares for MRSA is for surveillance purposes and, if positive, to facilitate isolation considerations in high risk settings. It is not intended for automatic decolonization interventions per se as regimens are not sufficiently effective to warrant routine use.     Culture result: (A) 12/01/2020 01:15 PM     LACTOSE FERMENTING GRAM NEGATIVE RODS IDENTIFICATION AND SUSCEPTIBILITY TO FOLLOW    Culture result: CHECKING FOR POSSIBLE 2ND GRAM NEGATIVE RICKY (A) 12/01/2020 01:15 PM       Radiology:     Medications       Current Facility-Administered Medications   Medication Dose Route Frequency Last Dose    famotidine (PEPCID) tablet 20 mg  20 mg Oral BID 20 mg at 12/03/20 1705    tbo-filgrastim (GRANIX) injection 300 mcg  300 mcg SubCUTAneous QPM      lactulose (CHRONULAC) 10 gram/15 mL solution 15 mL  10 g Oral BID 15 mL at 12/03/20 1700    [START ON 12/4/2020] pregabalin (LYRICA) capsule 150 mg  150 mg Oral BID      pregabalin (LYRICA) capsule 75 mg  75 mg Oral BID 75 mg at 12/03/20 1341    phenazopyridine (PYRIDIUM) tablet 100 mg  100 mg Oral TID  mg at 12/03/20 0903    balsam peru-castor oiL (VENELEX) ointment   Topical TID      epoetin annie-epbx (RETACRIT) injection 20,000 Units  20,000 Units SubCUTAneous Q7D      oxyCODONE IR (ROXICODONE) tablet 5 mg  5 mg Oral Q4H PRN 5 mg at 12/01/20 2145    docusate sodium (COLACE) capsule 100 mg  100 mg Oral  mg at 12/03/20 1700    sodium chloride (NS) flush 5-40 mL  5-40 mL IntraVENous Q8H 10 mL at 12/03/20 6301    sodium chloride (NS) flush 5-40 mL  5-40 mL IntraVENous PRN      0.9% sodium chloride infusion  75 mL/hr IntraVENous CONTINUOUS 75 mL/hr at 12/03/20 0338    acetaminophen (TYLENOL) tablet 650 mg  650 mg Oral Q4H  mg at 12/03/20 1525    heparin (porcine) injection 5,000 Units  5,000 Units SubCUTAneous Q8H 5,000 Units at 12/03/20 1658    cefepime (MAXIPIME) 2 g in 0.9% sodium chloride (MBP/ADV) 100 mL MBP  2 g IntraVENous Q8H 2 g at 12/03/20 1048           Case discussed with:      Emily Bills, DO

## 2020-12-03 NOTE — PROGRESS NOTES
Physician Progress Note      PATIENT:               Donnie Montgomery  CSN #:                  [id number]  :                       1945  ADMIT DATE:       2020 11:09 AM  DISCH DATE:  RESPONDING  PROVIDER #:        YEN Banks MD          QUERY TEXT:    Patient admitted with Neutropenic Fever, Encephalopathy, and documentation of LLL PNA in the H&P and also the 12/3 Progress note, but 'No evidence of PNA' is documented in the ID Consult note and also in the  Attending progress note. If possible, please document in progress notes and discharge summary if you are evaluating and /or treating any of the following: The medical record reflects the following:  Risk Factors: Immunocompromised  Clinical Indicators: pt with fevers at home to ER and found to be hypoxic, confused, with Temp 99.3, , RR 22. WBC 1.2, CXR showing 'LLL Infiltrate and possible effusion suggestive of PNA', however CTA Chest shows pleural effusion w/ overlying atelectasis. PNA is documented in the H&P and 12/3 progress note, however ID has documented 'No e/o PNA' and the  Progress Note says no e/o PNA as well. Treatment: Maxipime/ Zosyn/ Vanc/ Flagyl IV      Thank you, if you have any questions please e-mail me at  [email or web address]  Options provided:  -- LLL PNA confirmed  -- LLL PNA ruled out  -- Other - I will add my own diagnosis  -- Disagree - Not applicable / Not valid  -- Disagree - Clinically unable to determine / Unknown  -- Refer to Clinical Documentation Reviewer    PROVIDER RESPONSE TEXT:    After study, LLL PNA ruled out.     Query created by: Ilda Meza on 12/3/2020 2:38 PM      Electronically signed by:  Ena Banks MD 12/3/2020 5:16 PM

## 2020-12-03 NOTE — PROGRESS NOTES
Problem: Self Care Deficits Care Plan (Adult)  Goal: *Acute Goals and Plan of Care (Insert Text)  Description:   FUNCTIONAL STATUS PRIOR TO ADMISSION: Patient was modified independent using a rolling walker for functional mobility. HOME SUPPORT: The patient lived at home with her . Per report, patient has recently required much increased assist from  with all self-care and has been providing supervision/assist prn with mobility as well. Patient's spouse reports patient has declined significantly, had been much more independent - time period of decline unclear, requires further clarification. Occupational Therapy Goals  Initiated 12/3/2020  1. Patient will perform self-feeding with moderate assistance using most appropriate AE/set-up within 7 day(s). 2.  Patient will perform EOB grooming with supervision/set-up within 7 day(s). 3.  Patient will perform stand-pivot transfer to [facility] with moderate assistance x2 and RW within 7 days. 4.  Patient will perform all aspects of toileting with consistent maximal assistance within 7 day(s). 6.  Patient will participate in upper extremity therapeutic exercise/activities with moderate assistance for 5 minutes within 7 day(s). Outcome: Progressing Towards Goal     OCCUPATIONAL THERAPY EVALUATION  Patient: Kalie Kyle (56 y.o. female)  Date: 12/3/2020  Primary Diagnosis: SOB (shortness of breath) [R06.02]  SOB (shortness of breath) [R06.02]        Precautions:        ASSESSMENT  Based on the objective data described below, the patient presents with global weakness, decreased activity tolerance, impaired functional mobility and balance, significant buttocks pain, and grossly impaired fine motor skills. Patient reports decreased function in B hands/wrists since March of this year with patient and spouse attributing this to a sulfa allergy. This date, patient requiring mod-max assist to come to EOB.  Patient with fair dynamic balance and good static balance, tolerating position for approx 5 minutes before requiring return to supine 2/2 pain in sacral region. Noted patient incontinent of bowel and bladder - patient able to roll to B sides multiple trials for hygiene and linen management. Patient following cues for hand placement to her ability - increased time and occasional manual assist needed to facilitate grasp on bed rail. Of note, patient unable to extend wrists against gravity but able to complete partial movement in gravity-eliminated plane with physical assist to stabilize forearm and minimize compensatory movements. Patient with much difficulty coordinating bimanual tasks 2/2 grossly decreased strength, impaired dexterity/coordination, and poor activity tolerance. This date, patient progress primarily limited by pain in buttocks region - anticipate improved tolerance with pain better controlled. If progresses and able to tolerate, recommend IPR to maximize patient functional gains as she is well below her reported baseline at this time. Current Level of Function Impacting Discharge (ADLs/self-care): max-total    Functional Outcome Measure: The patient scored 5/100 on the Barthel Index. Other factors to consider for discharge: reports  significant decline over several months(?), motivated with good spouse support     Patient will benefit from skilled therapy intervention to address the above noted impairments. PLAN :  Recommendations and Planned Interventions: self care training, functional mobility training, therapeutic exercise, balance training, therapeutic activities, endurance activities, neuromuscular re-education, patient education, home safety training, and family training/education    Frequency/Duration: Patient will be followed by occupational therapy 5 times a week to address goals. Recommendation for discharge: (in order for the patient to meet his/her long term goals)  Therapy 3 hours per day 5-7 days per week;  If unable, would require SNF 2/2 significant physical assist required for all mobility and self-care with  unable to provide this level of support. This discharge recommendation:  Has been made in collaboration with the attending provider and/or case management    IF patient discharges home will need the following DME: TBD       SUBJECTIVE:   Patient stated the pain is incredible - I've never had this before.     OBJECTIVE DATA SUMMARY:   HISTORY:   Past Medical History:   Diagnosis Date    Chronic pain     Chronic pain disorder 1984-current     Degenerated intervertebral disc     Melanosis of colon     Nausea & vomiting     Psychiatric disorder     chronic Pain     PUD (peptic ulcer disease)      Past Surgical History:   Procedure Laterality Date    ABDOMEN SURGERY PROC UNLISTED      HX GYN      HX ORTHOPAEDIC      HX UROLOGICAL      NEUROLOGICAL PROCEDURE UNLISTED         Expanded or extensive additional review of patient history:   Home Situation  Home Environment: Private residence  # Steps to Enter: 7  Rails to Enter: Yes  Hand Rails : Bilateral  One/Two Story Residence: One story  Living Alone: No  Support Systems: Spouse/Significant Other/Partner  Patient Expects to be Discharged to[de-identified] Private residence  Current DME Used/Available at Home: viviana Clayton, [address] [facility], [place], straight, Commode, bedside(transport chair)  Tub or Shower Type: Shower(built-in seat)    Hand dominance: Right    EXAMINATION OF PERFORMANCE DEFICITS:  Cognitive/Behavioral Status:  Neurologic State: Alert  Orientation Level: Oriented X4  Cognition: Follows commands;Decreased attention/concentration  Perception: Cues to maintain midline in sitting  Perseveration: No perseveration noted  Safety/Judgement: Awareness of environment; Fall prevention;Home safety    Skin: Patient with skin breakdown on buttocks and one clear blister on L thigh - patient and spouse report RN aware. Hearing:   Auditory  Auditory Impairment: None    Vision/Perceptual: Acuity: Within Functional Limits       Range of Motion:  AROM: Generally decreased, functional  PROM: Generally decreased, functional    Strength:  Strength: Generally decreased, functional    Coordination:  Coordination: Grossly decreased, non-functional  Fine Motor Skills-Upper: Left Impaired;Right Impaired    Gross Motor Skills-Upper: Left Impaired;Right Impaired    Tone & Sensation:  Tone: Abnormal  Sensation: Intact    Balance:  Sitting - Static: Good (unsupported)  Sitting - Dynamic: Good (unsupported); Fair (occasional)    Functional Mobility and Transfers for ADLs:  Bed Mobility:  Rolling: Minimum assistance  Supine to Sit: Moderate assistance;Maximum assistance  Sit to Supine: Maximum assistance  Scooting: Maximum assistance    Transfers: Toilet Transfer : Minimum assistance(at bed level 2/2 pain and tolerance)    ADL Assessment:  Feeding: Maximum assistance; Total assistance    Oral Facial Hygiene/Grooming: Maximum assistance; Total assistance    Upper Body Dressing: Maximum assistance    Lower Body Dressing: Total assistance    Toileting: Total assistance    ADL Intervention and task modifications:  Feeding  Feeding Assistance: Maximum assistance; Total assistance (dependent)  Container Management: Total assistance (dependent)  Drink to Mouth: Maximum assistance  Cues: Verbal cues provided;Visual cues provided; Tactile cues provided;Physical assistance    Lower Body Dressing Assistance  Dressing Assistance: Total assistance(dependent)  Underpants: Maximum assistance; Total assistance (dependent)  Socks: Total assistance (dependent)  Position Performed: Seated edge of bed;Supine  Cues: Verbal cues provided;Visual cues provided; Tactile cues provided;Physical assistance    Toileting  Toileting Assistance: Total assistance(dependent)  Bowel Hygiene: Total assistance (dependent)  Clothing Management: Total assistance (dependent)  Cues: Verbal cues provided;Visual cues provided; Tactile cues provided    Cognitive Retraining  Safety/Judgement: Awareness of environment; Fall prevention;Home safety    Therapeutic Exercise:  Educated patient and spouse on completing wrist strengthening exercises within gravity eliminated plane - emphasis on wrist extension. Also encouraged full finger flexion/extension and coordination exercises in prep for improved grasp and participation in self-care. Functional Measure:  Barthel Index:    Bathin  Bladder: 0  Bowels: 5  Groomin  Dressin  Feedin  Mobility: 0  Stairs: 0  Toilet Use: 0  Transfer (Bed to Chair and Back): 0  Total: 5/100        The Barthel ADL Index: Guidelines  1. The index should be used as a record of what a patient does, not as a record of what a patient could do. 2. The main aim is to establish degree of independence from any help, physical or verbal, however minor and for whatever reason. 3. The need for supervision renders the patient not independent. 4. A patient's performance should be established using the best available evidence. Asking the patient, friends/relatives and nurses are the usual sources, but direct observation and common sense are also important. However direct testing is not needed. 5. Usually the patient's performance over the preceding 24-48 hours is important, but occasionally longer periods will be relevant. 6. Middle categories imply that the patient supplies over 50 per cent of the effort. 7. Use of aids to be independent is allowed. Migdalia Arias, Barthel, D.W. ([id number]). Functional evaluation: the Barthel Index. [facility] (14)2. LENORA Urbina, Patt Greenberg., Karen Hathaway., [place], [address] [place] [address] (). Measuring the change indisability after inpatient rehabilitation; comparison of the responsiveness of the Barthel Index and Functional [place] Measure. Journal of Neurology, Neurosurgery, and Psychiatry, 66(4), 244-320.   Con Armenta, N.J.A, CHANELL Coughlin, Luke Perez, MVereniceA. (2004.) Assessment of post-stroke quality of life in cost-effectiveness studies: The usefulness of the Barthel Index and the EuroQoL-5D. Quality of Life Research, 15, 918-32     Occupational Therapy Evaluation Charge Determination   History Examination Decision-Making   LOW Complexity : Brief history review  HIGH Complexity : 5 or more performance deficits relating to physical, cognitive , or psychosocial skils that result in activity limitations and / or participation restrictions HIGH Complexity : Patient presents with comorbidities that affect occupational performance. Signifigant modification of tasks or assistance (eg, physical or verbal) with assessment (s) is necessary to enable patient to complete evaluation       Based on the above components, the patient evaluation is determined to be of the following complexity level: LOW   Pain Rating:  Patient reporting 7/10 pain in buttocks. Activity Tolerance:   Fair and requires rest breaks    After treatment patient left in no apparent distress:    Supine in bed, Call bell within reach, Caregiver / family present, and Side rails x 3    COMMUNICATION/EDUCATION:   The patients plan of care was discussed with: Physical therapist and Registered nurse. Home safety education was provided and the patient/caregiver indicated understanding., Patient/family have participated as able in goal setting and plan of care. , and Patient/family agree to work toward stated goals and plan of care.     Thank you for this referral.  Libra Koroma OT  Time Calculation: 60 mins

## 2020-12-03 NOTE — PROGRESS NOTES
Urology Progress Note    Patient: Jeannine Rodriguez MRN: [id number]  SSN: [id number]    YOB: 1945  Age: 70 y.o. Sex: female        ADMITTED: 2020 to Martha Melgar MD for SOB (shortness of breath) [R06.02]  SOB (shortness of breath) [R06.02]  POD# * No surgery found *     She is feeling better and has no complaints. No fever. VSS. Urine culture - GNR. Blood cultures negative. Vitals: Temp (24hrs), Av.1 °F (36.7 °C), Min:97.7 °F (36.5 °C), Max:98.3 °F (36.8 °C)                   Blood pressure (!) 152/74, pulse 99, temperature 98.3 °F (36.8 °C), resp. rate 16, height 5' 4.02\" (1.626 m), weight 66.1 kg (145 lb 11.2 oz), SpO2 93 %. Intake and Output:   1901 -  0700  In: 2081.3 [I.V.:2081.3]  Out: 700 [Urine:700]            Labs:  Labs:   Lab Results   Component Value Date/Time    WBC 1.2 (L) 2020 06:16 AM    HGB 8.5 (L) 2020 06:16 AM    Creatinine 0.48 (L) 2020 03:48 AM         Assessment/Plan:   Await culture results. Abx per Dr Chandrakant Henriquez. I recommended elective treatment of her stones at some point as they may be contributing to her recurrent infections. She is undecided whether she wants to do that here (she saw Dr Rachel Hernandez last year) or at [facility] (followed by [facility]). No urgent intervention needed. We will be following peripherally. Call if any questions.        Signed By: Queta Ewing MD - December 3, 2020

## 2020-12-03 NOTE — PROGRESS NOTES
Occupational Therapy    Orders received, chart reviewed and patient evaluated by occupational therapy. Pending progression with skilled acute occupational therapy, recommend:  Therapy 3 hours per day 5-7 days per week    Recommend with nursing patient to complete as able in order to maintain strength, endurance and independence: chair position in bed for meals and encourage max participation in self-care as able/tolerated. Thank you for your assistance. Full evaluation to follow.      Lida Saleh OTR/L

## 2020-12-04 LAB
ALBUMIN SERPL-MCNC: 1.8 G/DL (ref 3.5–5)
ALBUMIN/GLOB SERPL: 0.6 {RATIO} (ref 1.1–2.2)
ALP SERPL-CCNC: 64 U/L (ref 45–117)
ALT SERPL-CCNC: 13 U/L (ref 12–78)
ANION GAP SERPL CALC-SCNC: 8 MMOL/L (ref 5–15)
AST SERPL-CCNC: 7 U/L (ref 15–37)
BACTERIA SPEC CULT: ABNORMAL
BACTERIA SPEC CULT: ABNORMAL
BASOPHILS # BLD: 0.1 K/UL (ref 0–0.1)
BASOPHILS # BLD: NORMAL K/UL (ref 0–0.1)
BASOPHILS NFR BLD: 2 % (ref 0–1)
BASOPHILS NFR BLD: NORMAL % (ref 0–1)
BILIRUB SERPL-MCNC: 0.3 MG/DL (ref 0.2–1)
BUN SERPL-MCNC: 26 MG/DL (ref 6–20)
BUN/CREAT SERPL: 47 (ref 12–20)
CALCIUM SERPL-MCNC: 8.6 MG/DL (ref 8.5–10.1)
CC UR VC: ABNORMAL
CHLORIDE SERPL-SCNC: 118 MMOL/L (ref 97–108)
CO2 SERPL-SCNC: 20 MMOL/L (ref 21–32)
CREAT SERPL-MCNC: 0.55 MG/DL (ref 0.55–1.02)
DIFFERENTIAL METHOD BLD: ABNORMAL
DIFFERENTIAL METHOD BLD: NORMAL
EOSINOPHIL # BLD: 0 K/UL (ref 0–0.4)
EOSINOPHIL # BLD: NORMAL K/UL (ref 0–0.4)
EOSINOPHIL NFR BLD: 1 % (ref 0–7)
EOSINOPHIL NFR BLD: NORMAL % (ref 0–7)
ERYTHROCYTE [DISTWIDTH] IN BLOOD BY AUTOMATED COUNT: 22.5 % (ref 11.5–14.5)
ERYTHROCYTE [DISTWIDTH] IN BLOOD BY AUTOMATED COUNT: NORMAL % (ref 11.5–14.5)
GLOBULIN SER CALC-MCNC: 2.9 G/DL (ref 2–4)
GLUCOSE SERPL-MCNC: 110 MG/DL (ref 65–100)
HCT VFR BLD AUTO: 27.3 % (ref 35–47)
HCT VFR BLD AUTO: NORMAL % (ref 35–47)
HGB BLD-MCNC: 8.4 G/DL (ref 11.5–16)
HGB BLD-MCNC: NORMAL G/DL (ref 11.5–16)
IMM GRANULOCYTES # BLD AUTO: 0 K/UL
IMM GRANULOCYTES # BLD AUTO: NORMAL K/UL (ref 0–0.04)
IMM GRANULOCYTES NFR BLD AUTO: 0 %
IMM GRANULOCYTES NFR BLD AUTO: NORMAL % (ref 0–0.5)
LYMPHOCYTES # BLD: 0.7 K/UL (ref 0.8–3.5)
LYMPHOCYTES # BLD: NORMAL K/UL (ref 0.8–3.5)
LYMPHOCYTES NFR BLD: 22 % (ref 12–49)
LYMPHOCYTES NFR BLD: NORMAL % (ref 12–49)
MCH RBC QN AUTO: 34 PG (ref 26–34)
MCH RBC QN AUTO: NORMAL PG (ref 26–34)
MCHC RBC AUTO-ENTMCNC: 30.8 G/DL (ref 30–36.5)
MCHC RBC AUTO-ENTMCNC: NORMAL G/DL (ref 30–36.5)
MCV RBC AUTO: 110.5 FL (ref 80–99)
MCV RBC AUTO: NORMAL FL (ref 80–99)
MONOCYTES # BLD: 0.6 K/UL (ref 0–1)
MONOCYTES # BLD: NORMAL K/UL (ref 0–1)
MONOCYTES NFR BLD: 21 % (ref 5–13)
MONOCYTES NFR BLD: NORMAL % (ref 5–13)
NEUTS BAND NFR BLD MANUAL: 9 % (ref 0–6)
NEUTS SEG # BLD: 1.6 K/UL (ref 1.8–8)
NEUTS SEG # BLD: NORMAL K/UL (ref 1.8–8)
NEUTS SEG NFR BLD: 45 % (ref 32–75)
NEUTS SEG NFR BLD: NORMAL % (ref 32–75)
NRBC # BLD: 0.04 K/UL (ref 0–0.01)
NRBC # BLD: NORMAL K/UL (ref 0–0.01)
NRBC BLD-RTO: 1.4 PER 100 WBC
NRBC BLD-RTO: NORMAL PER 100 WBC
PLATELET # BLD AUTO: 276 K/UL (ref 150–400)
PLATELET # BLD AUTO: NORMAL K/UL (ref 150–400)
PMV BLD AUTO: 10.8 FL (ref 8.9–12.9)
PMV BLD AUTO: NORMAL FL (ref 8.9–12.9)
POTASSIUM SERPL-SCNC: 4 MMOL/L (ref 3.5–5.1)
PROT SERPL-MCNC: 4.7 G/DL (ref 6.4–8.2)
RBC # BLD AUTO: 2.47 M/UL (ref 3.8–5.2)
RBC # BLD AUTO: NORMAL M/UL (ref 3.8–5.2)
RBC MORPH BLD: ABNORMAL
SERVICE CMNT-IMP: ABNORMAL
SODIUM SERPL-SCNC: 146 MMOL/L (ref 136–145)
WBC # BLD AUTO: 3 K/UL (ref 3.6–11)
WBC # BLD AUTO: NORMAL K/UL (ref 3.6–11)
WBC MORPH BLD: ABNORMAL

## 2020-12-04 PROCEDURE — 74011250636 HC RX REV CODE- 250/636: Performed by: NURSE PRACTITIONER

## 2020-12-04 PROCEDURE — 74011250636 HC RX REV CODE- 250/636: Performed by: INTERNAL MEDICINE

## 2020-12-04 PROCEDURE — 74011250637 HC RX REV CODE- 250/637: Performed by: INTERNAL MEDICINE

## 2020-12-04 PROCEDURE — 74011250637 HC RX REV CODE- 250/637: Performed by: NURSE PRACTITIONER

## 2020-12-04 PROCEDURE — 36415 COLL VENOUS BLD VENIPUNCTURE: CPT

## 2020-12-04 PROCEDURE — 74011250636 HC RX REV CODE- 250/636: Performed by: FAMILY MEDICINE

## 2020-12-04 PROCEDURE — 74011250637 HC RX REV CODE- 250/637: Performed by: FAMILY MEDICINE

## 2020-12-04 PROCEDURE — 80053 COMPREHEN METABOLIC PANEL: CPT

## 2020-12-04 PROCEDURE — 85025 COMPLETE CBC W/AUTO DIFF WBC: CPT

## 2020-12-04 PROCEDURE — 65660000000 HC RM CCU STEPDOWN

## 2020-12-04 PROCEDURE — 99232 SBSQ HOSP IP/OBS MODERATE 35: CPT | Performed by: INTERNAL MEDICINE

## 2020-12-04 PROCEDURE — 74011000258 HC RX REV CODE- 258: Performed by: FAMILY MEDICINE

## 2020-12-04 PROCEDURE — 97530 THERAPEUTIC ACTIVITIES: CPT

## 2020-12-04 RX ORDER — CEPHALEXIN 250 MG/1
500 CAPSULE ORAL EVERY 8 HOURS
Status: DISCONTINUED | OUTPATIENT
Start: 2020-12-04 | End: 2020-12-11 | Stop reason: HOSPADM

## 2020-12-04 RX ADMIN — CEFEPIME HYDROCHLORIDE 2 G: 2 INJECTION, POWDER, FOR SOLUTION INTRAVENOUS at 04:02

## 2020-12-04 RX ADMIN — PREGABALIN 150 MG: 75 CAPSULE ORAL at 21:27

## 2020-12-04 RX ADMIN — CEPHALEXIN 500 MG: 250 CAPSULE ORAL at 17:31

## 2020-12-04 RX ADMIN — IBUPROFEN 400 MG: 400 TABLET ORAL at 07:12

## 2020-12-04 RX ADMIN — Medication 10 ML: at 21:28

## 2020-12-04 RX ADMIN — Medication: at 17:33

## 2020-12-04 RX ADMIN — SODIUM CHLORIDE 75 ML/HR: 900 INJECTION, SOLUTION INTRAVENOUS at 11:06

## 2020-12-04 RX ADMIN — HEPARIN SODIUM 5000 UNITS: 5000 INJECTION INTRAVENOUS; SUBCUTANEOUS at 08:30

## 2020-12-04 RX ADMIN — Medication: at 21:28

## 2020-12-04 RX ADMIN — IBUPROFEN 400 MG: 400 TABLET ORAL at 17:51

## 2020-12-04 RX ADMIN — HEPARIN SODIUM 5000 UNITS: 5000 INJECTION INTRAVENOUS; SUBCUTANEOUS at 17:31

## 2020-12-04 RX ADMIN — TBO-FILGRASTIM 300 MCG: 480 INJECTION, SOLUTION SUBCUTANEOUS at 17:31

## 2020-12-04 RX ADMIN — CEFEPIME HYDROCHLORIDE 2 G: 2 INJECTION, POWDER, FOR SOLUTION INTRAVENOUS at 11:06

## 2020-12-04 RX ADMIN — Medication 10 ML: at 07:12

## 2020-12-04 RX ADMIN — ONDANSETRON 4 MG: 2 INJECTION INTRAMUSCULAR; INTRAVENOUS at 08:53

## 2020-12-04 RX ADMIN — CEPHALEXIN 500 MG: 250 CAPSULE ORAL at 21:27

## 2020-12-04 RX ADMIN — ACETAMINOPHEN 650 MG: 325 TABLET ORAL at 21:27

## 2020-12-04 RX ADMIN — Medication: at 08:31

## 2020-12-04 RX ADMIN — HEPARIN SODIUM 5000 UNITS: 5000 INJECTION INTRAVENOUS; SUBCUTANEOUS at 04:02

## 2020-12-04 RX ADMIN — PREGABALIN 150 MG: 75 CAPSULE ORAL at 08:30

## 2020-12-04 RX ADMIN — FAMOTIDINE 20 MG: 20 TABLET, FILM COATED ORAL at 17:32

## 2020-12-04 RX ADMIN — FAMOTIDINE 20 MG: 20 TABLET, FILM COATED ORAL at 08:30

## 2020-12-04 NOTE — PROGRESS NOTES
Hematology-Oncology Progress Note      Jacqueline Blunt  1945  [id number]  12/4/2020      Subjective:     Weak, awake alert, having some elena-rectal discomfort with bowel movements      Allergies: Fentanyl; Celebrex [celecoxib];  Duloxetine; and Sulfa (sulfonamide antibiotics)  Current Facility-Administered Medications   Medication Dose Route Frequency Provider Last Rate Last Dose    famotidine (PEPCID) tablet 20 mg  20 mg Oral BID Whitney Magdaleno NP   20 mg at 12/04/20 0830    tbo-filgrastim (GRANIX) injection 300 mcg  300 mcg SubCUTAneous QPM Lindsay Reza MD   300 mcg at 12/03/20 1904    lactulose (CHRONULAC) 10 gram/15 mL solution 15 mL  10 g Oral BID Lindsay Reza MD   15 mL at 12/03/20 1700    pregabalin (LYRICA) capsule 150 mg  150 mg Oral BID Samanta Brink MD   150 mg at 12/04/20 0830    ondansetron (ZOFRAN) injection 4 mg  4 mg IntraVENous Q4H PRN Laura Ramirez NP   4 mg at 12/04/20 0853    ibuprofen (MOTRIN) tablet 400 mg  400 mg Oral Q4H PRN Laura Ramirez NP   400 mg at 12/04/20 5094    phenazopyridine (PYRIDIUM) tablet 100 mg  100 mg Oral TID PRN Wesley Anderson NP   100 mg at 12/03/20 9300    balsam peru-castor oiL (VENELEX) ointment   Topical TID Jenni Phillips MD        epoetin annie-epbx (RETACRIT) injection 20,000 Units  20,000 Units SubCUTAneous Q7D Fartun Park MD   20,000 Units at 12/03/20 2105    oxyCODONE IR (ROXICODONE) tablet 5 mg  5 mg Oral Q4H PRN Laura Ramirez NP   5 mg at 12/01/20 2145    docusate sodium (COLACE) capsule 100 mg  100 mg Oral BID Marianne Briceno MD   100 mg at 12/03/20 1700    sodium chloride (NS) flush 5-40 mL  5-40 mL IntraVENous Q8H Alex Castillo MD   10 mL at 12/04/20 5575    sodium chloride (NS) flush 5-40 mL  5-40 mL IntraVENous PRN Marianne Briceno MD        0.9% sodium chloride infusion  75 mL/hr IntraVENous CONTINUOUS Mariannesandie Briceno MD 75 mL/hr at 12/03/20 2214 75 mL/hr at 12/03/20 2214    acetaminophen (TYLENOL) tablet 650 mg  650 mg Oral Q4H PRN Mihir Acevedo MD   650 mg at 12/03/20 1525    heparin (porcine) injection 5,000 Units  5,000 Units SubCUTAneous Q8H Mihir Acevedo MD   5,000 Units at 12/04/20 0830    cefepime (MAXIPIME) 2 g in 0.9% sodium chloride (MBP/ADV) 100 mL MBP  2 g IntraVENous Q8H Mihir Acevedo  mL/hr at 12/04/20 0402 2 g at 12/04/20 0402     Objective:     Patient Vitals for the past 24 hrs:   BP Temp Pulse Resp SpO2 Weight   12/04/20 0730 137/69 98 °F (36.7 °C) 94 16 93 %    12/04/20 0411 138/67 98.2 °F (36.8 °C) 85 16 92 % 68.7 kg (151 lb 7.3 oz)   12/03/20 2323 138/64 97.6 °F (36.4 °C) 86 16 90 %    12/03/20 1953 (!) 159/75 98.6 °F (37 °C) 89 18 92 %    12/03/20 1657 120/63 98.1 °F (36.7 °C) 85 18 94 %    12/03/20 1103 (!) 148/75 98.1 °F (36.7 °C) 96 16 93 %        Gen: NAD  HEENT: PERRL, Sclerae anicteric  Cv: RRR without m/r/g  Pulm: aerating well bilat  Abd: NABS, NTND, No HSM  Ext: No c/c/e    Available labs reviewed:  Labs:    Recent Results (from the past 24 hour(s))   HEMOGLOBIN A1C WITH EAG    Collection Time: 12/03/20 12:31 PM   Result Value Ref Range    Hemoglobin A1c 4.7 4.0 - 5.6 %    Est. average glucose 88 mg/dL   CBC W/O DIFF    Collection Time: 12/03/20 12:31 PM   Result Value Ref Range    WBC 1.1 (L) 3.6 - 11.0 K/uL    RBC 2.58 (L) 3.80 - 5.20 M/uL    HGB 8.8 (L) 11.5 - 16.0 g/dL    HCT 28.6 (L) 35.0 - 47.0 %    .9 (H) 80.0 - 99.0 FL    MCH 34.1 (H) 26.0 - 34.0 PG    MCHC 30.8 30.0 - 36.5 g/dL    RDW 23.3 (H) 11.5 - 14.5 %    PLATELET 845 845 - 562 K/uL    MPV 10.9 8.9 - 12.9 FL    NRBC 1.9 (H) 0  WBC    ABSOLUTE NRBC 0.02 (H) 0.00 - 0.46 K/uL   METABOLIC PANEL, COMPREHENSIVE    Collection Time: 12/04/20  4:13 AM   Result Value Ref Range    Sodium 146 (H) 136 - 145 mmol/L    Potassium 4.0 3.5 - 5.1 mmol/L    Chloride 118 (H) 97 - 108 mmol/L    CO2 20 (L) 21 - 32 mmol/L    Anion gap 8 5 - 15 mmol/L    Glucose 110 (H) 65 - 100 mg/dL    BUN 26 (H) 6 - 20 MG/DL    Creatinine 0.55 0.55 - 1.02 MG/DL    BUN/Creatinine ratio 47 (H) 12 - 20      GFR est AA >60 >60 ml/min/1.73m2    GFR est non-AA >60 >60 ml/min/1.73m2    Calcium 8.6 8.5 - 10.1 MG/DL    Bilirubin, total 0.3 0.2 - 1.0 MG/DL    ALT (SGPT) 13 12 - 78 U/L    AST (SGOT) 7 (L) 15 - 37 U/L    Alk. phosphatase 64 45 - 117 U/L    Protein, total 4.7 (L) 6.4 - 8.2 g/dL    Albumin 1.8 (L) 3.5 - 5.0 g/dL    Globulin 2.9 2.0 - 4.0 g/dL    A-G Ratio 0.6 (L) 1.1 - 2.2     CBC WITH AUTOMATED DIFF    Collection Time: 12/04/20  4:13 AM   Result Value Ref Range    WBC PLEASE DISREGARD RESULTS 3.6 - 11.0 K/uL    RBC PLEASE DISREGARD RESULTS 3.80 - 5.20 M/uL    HGB PLEASE DISREGARD RESULTS 11.5 - 16.0 g/dL    HCT PLEASE DISREGARD RESULTS 35.0 - 47.0 %    MCV Cannot be calculated 80.0 - 99.0 FL    MCH Cannot be calculated 26.0 - 34.0 PG    MCHC Cannot be calculated 30.0 - 36.5 g/dL    RDW PLEASE DISREGARD RESULTS 11.5 - 14.5 %    PLATELET PLEASE DISREGARD RESULTS 150 - 400 K/uL    MPV PLEASE DISREGARD RESULTS 8.9 - 12.9 FL    NRBC PLEASE DISREGARD RESULTS 0  WBC    ABSOLUTE NRBC PLEASE DISREGARD RESULTS 0.00 - 0.01 K/uL    NEUTROPHILS PLEASE DISREGARD RESULTS 32 - 75 %    LYMPHOCYTES PLEASE DISREGARD RESULTS 12 - 49 %    MONOCYTES PLEASE DISREGARD RESULTS 5 - 13 %    EOSINOPHILS PLEASE DISREGARD RESULTS 0 - 7 %    BASOPHILS PLEASE DISREGARD RESULTS 0 - 1 %    IMMATURE GRANULOCYTES PLEASE DISREGARD RESULTS 0.0 - 0.5 %    ABS. NEUTROPHILS PLEASE DISREGARD RESULTS 1.8 - 8.0 K/UL    ABS. LYMPHOCYTES Not performed 0.8 - 3.5 K/UL    ABS. MONOCYTES Not performed 0.0 - 1.0 K/UL    ABS. EOSINOPHILS Not performed 0.0 - 0.4 K/UL    ABS. BASOPHILS Not performed 0.0 - 0.1 K/UL    ABS. IMM. GRANS.  PLEASE DISREGARD RESULTS 0.00 - 0.04 K/UL    DF PLEASE DISREGARD RESULTS     CBC WITH AUTOMATED DIFF    Collection Time: 12/04/20  6:24 AM   Result Value Ref Range    WBC 3.0 (L) 3.6 - 11.0 K/uL    RBC 2.47 (L) 3.80 - 5.20 M/uL    HGB 8.4 (L) 11.5 - 16.0 g/dL    HCT 27.3 (L) 35.0 - 47.0 %    .5 (H) 80.0 - 99.0 FL    MCH 34.0 26.0 - 34.0 PG    MCHC 30.8 30.0 - 36.5 g/dL    RDW 22.5 (H) 11.5 - 14.5 %    PLATELET 856 424 - 099 K/uL    MPV 10.8 8.9 - 12.9 FL    NRBC 1.4 (H) 0  WBC    ABSOLUTE NRBC 0.04 (H) 0.00 - 0.01 K/uL    NEUTROPHILS 45 32 - 75 %    BAND NEUTROPHILS 9 (H) 0 - 6 %    LYMPHOCYTES 22 12 - 49 %    MONOCYTES 21 (H) 5 - 13 %    EOSINOPHILS 1 0 - 7 %    BASOPHILS 2 (H) 0 - 1 %    IMMATURE GRANULOCYTES 0 %    ABS. NEUTROPHILS 1.6 (L) 1.8 - 8.0 K/UL    ABS. LYMPHOCYTES 0.7 (L) 0.8 - 3.5 K/UL    ABS. MONOCYTES 0.6 0.0 - 1.0 K/UL    ABS. EOSINOPHILS 0.0 0.0 - 0.4 K/UL    ABS. BASOPHILS 0.1 0.0 - 0.1 K/UL    ABS. IMM. GRANS. 0.0 K/UL    DF MANUAL      RBC COMMENTS MACROCYTOSIS  2+        RBC COMMENTS ANISOCYTOSIS  2+        RBC COMMENTS OVALOCYTES  PRESENT        RBC COMMENTS HYPOCHROMIA  1+        WBC COMMENTS VACUOLATED POLYS           Assessment and Plan     71 y/o woman with MDS (MLD-RS, low risk) on outpatient procrit/luspatercept, now admitted with questionable UTI and confusion. 1. Confusion: seen by Neuro, MRI brain ordered and pending. CT normal. This appears to be resolving    2. MDS: hgb 8.4 this am,,, continue procrit. ..she has responded well to grannix in the out patient setting nicely on three occasions. Derril Zina given the UTI and severe neutropenia I will started that drug on 12/3 and the count has increased to 3.1 this am.. continue grannix      3. Bladder outlet obsruction:Dr. Lindsay Li saw her and offered lithotripsy for non obstructing stones. . she is considering this    4. Abd. Pain/bloating. . this is a chronic concern of hers. .. I had offered w/u with CT in the office in October but she wanted [facility] urology to work this up. CT done this admit.  Shows non obstructing renal calculi,.,, no ascites or sign of malignancy    Christian Lam MD

## 2020-12-04 NOTE — PROGRESS NOTES
Bedside and Verbal shift change report given to Jordon Galloway (oncoming nurse) by Krupa Acevedo RN (offgoing nurse). Report included the following information SBAR, Kardex, ED Summary, Procedure Summary, Intake/Output, MAR, Recent Results and Cardiac Rhythm NSR.

## 2020-12-04 NOTE — PROGRESS NOTES
[facility] Progress Note           Maryland Sever DO    [phone] Office  [phone]  Fax    2020      Assessment & Plan:   · Escherichia coli  UTI. UA bland but will need to treat in setting of fever and neutropenia. CT reveals nephrolithiasis but no obstruction. Blood cultures sterile to date. Urology following for nephrolithiasis which will be addressed in the outpatient setting. Narrow antibiotics to cephalexin 500 mg p.o. every 8 through 2020     · Fever. Likely due to above. No further elevated temperatures since admission. · Myelodysplastic syndrome with chronic leukopenia/neutropenia. Granix ordered by oncology. ANC up to 1600 today   · AMS. Likely related to infection. Improving. Neurology following  · Sulfa allergy          Subjective:     No new complaints    Objective:     Vitals:   Visit Vitals  BP (!) 147/74 (BP 1 Location: Right arm, BP Patient Position: At rest)   Pulse 79   Temp 97.1 °F (36.2 °C)   Resp 16   Ht 5' 4.02\" (1.626 m)   Wt 151 lb 7.3 oz (68.7 kg)   SpO2 90%   BMI 25.98 kg/m²        Tmax:  Temp (24hrs), Av.9 °F (36.6 °C), Min:97.1 °F (36.2 °C), Max:98.6 °F (37 °C)      Exam:   Patient is intubated:  no    Physical Examination:   General:   NAD   Head:  Normocephalic, atraumatic. Eyes:     Neck: Supple       Lungs:   No distress. Clear to auscultation anteriorly   Chest wall:     Heart:  Regular rate and rhythm   Abdomen:   Soft, non-tender, non-distended   Extremities:    Skin:  No rash   Neurologic:      Labs:        No lab exists for component: ITNL   No results for input(s): CPK, CKMB, TROIQ in the last 72 hours.   Recent Labs     20  0624 20  0413 20  1231  20  0348 20  0415   NA  --  146*  --   --  147* 147*   K  --  4.0  --   --  4.2 3.2*   CL  --  118*  --   --  119* 113*   CO2  --  20*  --   --  21 25   BUN  --  26*  --   --  24* 21*   CREA  --  0.55  --   --  0.48* 0.43*   GLU  -- 110*  --   --  83 94   ALB  --  1.8*  --   --  1.6* 1.8*   WBC 3.0* PLEASE DISREGARD RESULTS 1.1*   < > SPECIMEN INTEGRITY QUESTIONABLE. SUGGEST RECOLLECTION 1.3*   HGB 8.4* PLEASE DISREGARD RESULTS 8.8*   < > SPECIMEN INTEGRITY QUESTIONABLE. SUGGEST RECOLLECTION 8.5*   HCT 27.3* PLEASE DISREGARD RESULTS 28.6*   < > SPECIMEN INTEGRITY QUESTIONABLE. SUGGEST RECOLLECTION 27.2*    PLEASE DISREGARD RESULTS 243   < > SPECIMEN INTEGRITY QUESTIONABLE. SUGGEST RECOLLECTION 242    < > = values in this interval not displayed. No results for input(s): INR, PTP, APTT, INREXT, INREXT in the last 72 hours. Needs: urine analysis, urine sodium, protein and creatinine  No results found for: TANISHA, CREAU      Cultures:     Lab Results   Component Value Date/Time    Specimen Description: URINE 04/23/2012 07:45 PM     Lab Results   Component Value Date/Time    Culture result: MRSA NOT PRESENT 12/01/2020 02:27 PM    Culture result:  12/01/2020 02:27 PM         Screening of patient nares for MRSA is for surveillance purposes and, if positive, to facilitate isolation considerations in high risk settings. It is not intended for automatic decolonization interventions per se as regimens are not sufficiently effective to warrant routine use.     Culture result: ESCHERICHIA COLI (A) 12/01/2020 01:15 PM    Culture result: 2ND STRAIN OF ESCHERICHIA COLI (A) 12/01/2020 01:15 PM       Radiology:     Medications       Current Facility-Administered Medications   Medication Dose Route Frequency Last Dose    famotidine (PEPCID) tablet 20 mg  20 mg Oral BID 20 mg at 12/04/20 0830    tbo-filgrastim (GRANIX) injection 300 mcg  300 mcg SubCUTAneous  mcg at 12/03/20 1904    lactulose (CHRONULAC) 10 gram/15 mL solution 15 mL  10 g Oral BID 15 mL at 12/03/20 1700    pregabalin (LYRICA) capsule 150 mg  150 mg Oral  mg at 12/04/20 0830    ondansetron (ZOFRAN) injection 4 mg  4 mg IntraVENous Q4H PRN 4 mg at 12/04/20 0853    ibuprofen (MOTRIN) tablet 400 mg  400 mg Oral Q4H  mg at 12/04/20 2780    phenazopyridine (PYRIDIUM) tablet 100 mg  100 mg Oral TID  mg at 12/03/20 0903    balsam peru-castor oiL (VENELEX) ointment   Topical TID      epoetin annie-epbx (RETACRIT) injection 20,000 Units  20,000 Units SubCUTAneous Q7D 20,000 Units at 12/03/20 2105    oxyCODONE IR (ROXICODONE) tablet 5 mg  5 mg Oral Q4H PRN 5 mg at 12/01/20 2145    docusate sodium (COLACE) capsule 100 mg  100 mg Oral  mg at 12/03/20 1700    sodium chloride (NS) flush 5-40 mL  5-40 mL IntraVENous Q8H 10 mL at 12/04/20 5049    sodium chloride (NS) flush 5-40 mL  5-40 mL IntraVENous PRN      0.9% sodium chloride infusion  75 mL/hr IntraVENous CONTINUOUS 75 mL/hr at 12/04/20 1106    acetaminophen (TYLENOL) tablet 650 mg  650 mg Oral Q4H  mg at 12/03/20 1525    heparin (porcine) injection 5,000 Units  5,000 Units SubCUTAneous Q8H 5,000 Units at 12/04/20 0830    cefepime (MAXIPIME) 2 g in 0.9% sodium chloride (MBP/ADV) 100 mL MBP  2 g IntraVENous Q8H 2 g at 12/04/20 1106           Case discussed with:      Maryland Sever, DO

## 2020-12-04 NOTE — PROGRESS NOTES
12/4/2020 -   TORSTEN:  - RUR: 9%  - Disposition is TBD dependent on progression: Rehab vs Home with [place]  - Patient will need IM Letter prior to discharge    - ABX Continue  - Cultures pending    09:25 -   CM notes patient's rehab recommendation and rounded on patient with nursing. Per nursing, family's preference has been strongly indicated as having patient discharge to home with potential HH. Spouse is to be at bedside around 16:00, and nursing requested for CM to meet with patient and spouse together. CM to meet with patient later today. CRM: Ceasar Taveras, MPH, [facility]; Z: [phone]    16:20 -   CM attempted to meet with patient do discuss rehab. Patient was sleeping soundly and did not respond to door knock or name call. Spouse is not present at bedside yet. CM to request for Weekend CM Team to follow up re: rehab. Nursing aware of the above.   CRM: Ceasar Taveras, MPH, [address] [place] [address]; Z: [phone]

## 2020-12-04 NOTE — PROGRESS NOTES
Problem: Falls - Risk of  Goal: *Absence of Falls  Description: Document Viry Cummings Fall Risk and appropriate interventions in the flowsheet. Outcome: Progressing Towards Goal  Note: Fall Risk Interventions:  Mobility Interventions: Bed/chair exit alarm, Communicate number of staff needed for ambulation/transfer    Mentation Interventions: Bed/chair exit alarm, Adequate sleep, hydration, pain control, Door open when patient unattended, Evaluate medications/consider consulting pharmacy, Family/sitter at bedside, Reorient patient, Update white board    Medication Interventions: Bed/chair exit alarm, Evaluate medications/consider consulting pharmacy    Elimination Interventions: Bed/chair exit alarm, Call light in reach    History of Falls Interventions: Bed/chair exit alarm, Evaluate medications/consider consulting pharmacy         Problem: Patient Education: Go to Patient Education Activity  Goal: Patient/Family Education  Outcome: Progressing Towards Goal     Problem: Pressure Injury - Risk of  Goal: *Prevention of pressure injury  Description: Document Neeraj Scale and appropriate interventions in the flowsheet.   Outcome: Progressing Towards Goal  Note: Pressure Injury Interventions:  Sensory Interventions: Assess changes in LOC, Assess need for specialty bed, Keep linens dry and wrinkle-free, Maintain/enhance activity level, Minimize linen layers    Moisture Interventions: Absorbent underpads, Apply protective barrier, creams and emollients, Maintain skin hydration (lotion/cream), Minimize layers, Moisture barrier    Activity Interventions: Increase time out of bed    Mobility Interventions: Assess need for specialty bed, HOB 30 degrees or less    Nutrition Interventions: Document food/fluid/supplement intake    Friction and Shear Interventions: Apply protective barrier, creams and emollients, Feet elevated on foot rest, HOB 30 degrees or less, Minimize layers                Problem: Patient Education: Go to Patient Education Activity  Goal: Patient/Family Education  Outcome: Progressing Towards Goal     Problem: Risk for Spread of Infection  Goal: Prevent transmission of infectious organism to others  Description: Prevent the transmission of infectious organisms to other patients, staff members, and visitors.   Outcome: Progressing Towards Goal     Problem: Patient Education:  Go to Education Activity  Goal: Patient/Family Education  Outcome: Progressing Towards Goal     Problem: Pain  Goal: *Control of Pain  Outcome: Progressing Towards Goal  Goal: *PALLIATIVE CARE:  Alleviation of Pain  Outcome: Progressing Towards Goal     Problem: Patient Education: Go to Patient Education Activity  Goal: Patient/Family Education  Outcome: Progressing Towards Goal     Problem: Patient Education: Go to Patient Education Activity  Goal: Patient/Family Education  Outcome: Progressing Towards Goal     Problem: Patient Education: Go to Patient Education Activity  Goal: Patient/Family Education  Outcome: Progressing Towards Goal

## 2020-12-04 NOTE — PROGRESS NOTES
Problem: Mobility Impaired (Adult and Pediatric)  Goal: *Acute Goals and Plan of Care (Insert Text)  Description: FUNCTIONAL STATUS PRIOR TO ADMISSION: Patient was modified independent using a rolling walker for functional mobility. HOME SUPPORT PRIOR TO ADMISSION: The patient lived with  but did not require assist.    Physical Therapy Goals  Initiated 12/2/2020  1. Patient will move from supine to sit and sit to supine  in bed with supervision/set-up within 7 day(s). 2.  Patient will transfer from bed to chair and chair to bed with minimal assistance/contact guard assist using the least restrictive device within 7 day(s). 3.  Patient will perform sit to stand with minimal assistance/contact guard assist within 7 day(s). 4.  Patient will ambulate with minimal assistance/contact guard assist for 10 feet with the least restrictive device within 7 day(s). Outcome: Progressing Towards Goal   PHYSICAL THERAPY TREATMENT  Patient: Soniya Khan (23 y.o. female)  Date: 12/4/2020  Diagnosis: SOB (shortness of breath) [R06.02]  SOB (shortness of breath) [R06.02]   PNA (pneumonia)       Precautions: Fall, Skin  Chart, physical therapy assessment, plan of care and goals were reviewed. ASSESSMENT  Patient continues with skilled PT services and is slowly progressing towards goals. Today she worked on sitting at the EOB and attempted sit <> stand. She has to much global weakness at this time to stand. She was able to participate in LAQs with an emphasis on slow controlled eccentric contractions. In sitting she is unable to toe tap but can initiate in supine. Anticipate slow steady gains. When asked pt reported rapid onset of this weakness stating that she was ambulatory with walker support just days prior to admission. Continue to recommend rehab. Current Level of Function Impacting Discharge (mobility/balance): up to max assist for bed mobility and unable to come to standing.     Other factors to consider for discharge: Myelodysplastic syndrome with chronic leukopenia/neutropenia, high fall risk, 7 steps to access her home         PLAN :  Patient continues to benefit from skilled intervention to address the above impairments. Continue treatment per established plan of care. to address goals. Recommendation for discharge: (in order for the patient to meet his/her long term goals)  Therapy 3 hours per day 5-7 days per week, if not that then SNF for rehab, if not that then home with HHPT and assist with all of mobility. This discharge recommendation:  A follow-up discussion with the attending provider and/or case management is planned    IF patient discharges home will need the following DME: hospital bed, mechanical lift and wheelchair if she were to discharge to home in present mobility state       SUBJECTIVE:   Patient stated I'll try.     OBJECTIVE DATA SUMMARY:   Chart checked,   Critical Behavior:  Neurologic State: Alert, Eyes open spontaneously  Orientation Level: Oriented X4  Cognition: Decreased attention/concentration, Follows commands  Safety/Judgement: Awareness of environment, Fall prevention, Home safety  Functional Mobility Training:  Bed Mobility:  Rolling: Minimum assistance  Supine to Sit: Moderate assistance  Sit to Supine: Maximum assistance           Transfers:  Sit to Stand: (unable to achieve standing )                                Balance:  Sitting: Impaired  Sitting - Static: Fair (occasional)  Sitting - Dynamic: Good (unsupported)  Standing: Impaired  Standing - Static: Poor  Standing - Dynamic : Poor  Ambulation/Gait Training:                                                        Stairs:               Therapeutic Exercises:   Seated LAQs 5 reps bilaterally   Pain Ratin-8 rectal pain    Activity Tolerance:   Good and with rest breaks    After treatment patient left in no apparent distress:   Supine in bed, Heels elevated for pressure relief, Call bell within reach, and Side rails x 3    COMMUNICATION/COLLABORATION:   The patients plan of care was discussed with: Registered nurse.      Carlie Song   Time Calculation: 29 mins

## 2020-12-04 NOTE — PROGRESS NOTES
Hospitalist Progress Note    NAME: Sloane Tomas   :  1945   MRN:  [id number]       Assessment / Plan:  LLL PNA: neutropenic fever, bone marrow dysfunction that was attributed to Bactrim. -Continue telemetry monitor  -IVF's, continue IV fluid, low urine output today  -SARS-CoV-2: negative  -oxygen support, pulse ox monitoring  -pro-Vlad level, 0.11, on 2020  -CT chest: Bilateral pleural effusions with overlying atelectasis. Coronary artery disease. Nonobstructed bilateral nephrolithiasis. Moderate rectal fecal retention. -ID following, appreciated recommendation  Continue cefepime. Discussed with ID, plan for p.o. antibiotic for 7 days.     Acute metabolic encephalopathy: Improved, due to above. -CT head: Indeterminate hyperdensity just above the sella turcica posteriorly. Contrast-enhanced MRI of the brain for further delineation recommended, findings consistent with mild chronic microvascular ischemic change, moderate to severe temporal predominant degree of cerebral atrophy.  -Neuro following: MRI C spine, Brain  -IV ABT: Continue cefepime  -IVF's  -neurovascular checks  -MRI brain: pending  -MRI C spine: r/o cord compression-pending  -TSH: 1.64  -B12: 2,000.   -fall precautions  -aspiration precautions, symptoms persist may consider further intervention and diagnostics,  final urine culture pending  -BCNGTD, MRSA- negative     Neutropenic Fever:  No fever last 24 hours  -monitor CBC  -Heme-onc consult:  Appreciated recommendation     Myelodysplastic syndrome: Appreciated recommendation,  Anemia: Chronic vs Acute, no obvious signs of bleeding.  -monitor CBC  -Heme-onc consult     Chronic Leukopenia/ Neutropenia: following adverse reaction to sulfa drug. Heme/Onc. Chronic UTI's, Hx Nephrolithiasis and Bladder Obstruction:  Urology following  Acute Hypoxic Respiratory Failure: Resolved, on room oxygen  -ABG stable  -plan as above     Constipation:  Patient has bowel movement now.   POA Left lower leg, abrasion from fall: 5 x 3 x 0.1 cm, 100% pink, no exudate, no odor. Periwound intact from erythema.  -daily cleanse with saline; apply Xeroform and dry dressing.  -Sacrum, buttocks and heels: Venelex TID.     DVTppx: Heparin  Code Status: Full Code  Diet: Cardiac  Activity: OOB to chair TID and PRN  Discharge: TBD     Subjective:     Chief Complaint / Reason for Physician Visit  \"Not feeling well today, very weak generalized pain. \".  Discussed with RN events overnight. Review of Systems:  Symptom Y/N Comments  Symptom Y/N Comments   Fever/Chills    Chest Pain     Poor Appetite    Edema     Cough    Abdominal Pain     Sputum    Joint Pain     SOB/HALL    Pruritis/Rash     Nausea/vomit    Tolerating PT/OT     Diarrhea    Tolerating Diet     Constipation    Other       Could NOT obtain due to:      Objective:     VITALS:   Last 24hrs VS reviewed since prior progress note. Most recent are:  Patient Vitals for the past 24 hrs:   Temp Pulse Resp BP SpO2   12/04/20 1057 97.1 °F (36.2 °C) 79 16 (!) 147/74 90 %   12/04/20 0730 98 °F (36.7 °C) 94 16 137/69 93 %   12/04/20 0411 98.2 °F (36.8 °C) 85 16 138/67 92 %   12/03/20 2323 97.6 °F (36.4 °C) 86 16 138/64 90 %   12/03/20 1953 98.6 °F (37 °C) 89 18 (!) 159/75 92 %   12/03/20 1657 98.1 °F (36.7 °C) 85 18 120/63 94 %       Intake/Output Summary (Last 24 hours) at 12/4/2020 1247  Last data filed at 12/4/2020 0411  Gross per 24 hour   Intake    Output 400 ml   Net -400 ml        I had a face to face encounter and independently examined this patient on 12/4/2020, as outlined below:  PHYSICAL EXAM:  General: Ill looking patient, frail. Alert, cooperative, no acute distress    EENT:  EOMI. Anicteric sclerae. MMM  Resp:  CTA bilaterally, no wheezing or rales. No accessory muscle use  CV:  Regular  rhythm,  No edema  GI:  Soft, Non distended, Non tender. +Bowel sounds  Neurologic:  Alert and oriented X 3, normal speech,   Psych:   Good insight.  Not anxious nor agitated  Skin:  No rashes. No jaundice    Reviewed most current lab test results and cultures  YES  Reviewed most current radiology test results   YES  Review and summation of old records today    NO  Reviewed patient's current orders and MAR    YES  PMH/SH reviewed - no change compared to H&P  ________________________________________________________________________  Care Plan discussed with:    Comments   Patient     Family      RN     Care Manager     Consultant                        Multidiciplinary team rounds were held today with , nursing, pharmacist and clinical coordinator. Patient's plan of care was discussed; medications were reviewed and discharge planning was addressed. ________________________________________________________________________  Total NON critical care TIME: 40 minutes    Total CRITICAL CARE TIME Spent:   Minutes non procedure based      Comments   >50% of visit spent in counseling and coordination of care     ________________________________________________________________________  Marisela Mendenhall MD     Procedures: see electronic medical records for all procedures/Xrays and details which were not copied into this note but were reviewed prior to creation of Plan. LABS:  I reviewed today's most current labs and imaging studies.   Pertinent labs include:  Recent Labs     12/04/20  0624 12/04/20 0413 12/03/20  1231   WBC 3.0* PLEASE DISREGARD RESULTS 1.1*   HGB 8.4* PLEASE DISREGARD RESULTS 8.8*   HCT 27.3* PLEASE DISREGARD RESULTS 28.6*    PLEASE DISREGARD RESULTS 243     Recent Labs     12/04/20  0413 12/03/20  0348 12/02/20  0415   * 147* 147*   K 4.0 4.2 3.2*   * 119* 113*   CO2 20* 21 25   * 83 94   BUN 26* 24* 21*   CREA 0.55 0.48* 0.43*   CA 8.6 8.3* 8.7   ALB 1.8* 1.6* 1.8*   TBILI 0.3 0.4 0.4   ALT 13 12 12       Signed: Marisela Mendenhall MD

## 2020-12-04 NOTE — PROGRESS NOTES
Patient: Audi Ernst MRN: [id number]  SSN: [id number]    YOB: 1945  Age: 70 y.o. Sex: female        ADMITTED: 2020 to Ankur Mena MD by Guerrero Jimenez MD for SOB (shortness of breath) [R06.02]  SOB (shortness of breath) [R06.02]    1.8 x 1.1 cm Left Lower Pole Staghorn Calculus  Right renal pelvis calculus measures 7 x 10 mm.  +Gram Negative Tao UTI  Hx of incomplete bladder emptying     She is feeling better. No complaints. Afebrile. VSS. Voiding independently with brief. Hx of incomplete bladder emptying. Last documented bladder scan 357 cc, unsure if bladder scan was post-void. Denies suprapubic pain. Abdomen soft. AFVSS  WBC 3.0, Heme/Onc following  Hgb 8.4  Cr 0.55  UA 0-4 WBCs, 0-5 RBCs, 4+ bacteria  UCx + LACTOSE FERMENTING GRAM NEGATIVE RODS  BCx NGTD  +Maxipime- ID following   Covid -     CT A/P wo 20:  KIDNEYS: Early staghorn calculus lower pole collecting system left kidney  measures 1.8 x 1.1 cm. Right renal pelvis calculus measures 7 x 10 mm. No  hydronephrosis, mass, or other abnormality. No ureteral dilation. URINARY BLADDER: Small nondependent intraluminal bubble of air. Otherwise  unremarkable with no mass or calculus.     IMPRESSION:  1. Bilateral pleural effusions with overlying atelectasis. 2. Coronary artery disease  3. Nonobstructed bilateral nephrolithiasis. 4. Moderate rectal fecal retention. 5. Additional incidentals as above. Vitals: Temp (24hrs), Av.1 °F (36.7 °C), Min:97.6 °F (36.4 °C), Max:98.6 °F (37 °C)    Blood pressure 137/69, pulse 94, temperature 98 °F (36.7 °C), resp. rate 16, height 5' 4.02\" (1.626 m), weight 68.7 kg (151 lb 7.3 oz), SpO2 93 %.     Intake and Output:   1901 -  0700  In: 25 [P.O.:25]  Out: 650 [Urine:650]    Labs:  CBC:   Lab Results   Component Value Date/Time    WBC 3.0 (L) 2020 06:24 AM    HCT 27.3 (L) 2020 06:24 AM    PLATELET 037  06:24 AM     BMP:   Lab Results Component Value Date/Time    Glucose 110 (H) 12/04/2020 04:13 AM    Sodium 146 (H) 12/04/2020 04:13 AM    Potassium 4.0 12/04/2020 04:13 AM    Chloride 118 (H) 12/04/2020 04:13 AM    CO2 20 (L) 12/04/2020 04:13 AM    BUN 26 (H) 12/04/2020 04:13 AM    Creatinine 0.55 12/04/2020 04:13 AM    Calcium 8.6 12/04/2020 04:13 AM       Assessment/Plan:     1.8 x 1.1 cm Left Lower Pole Staghorn Calculus  Right renal pelvis calculus measures 7 x 10 mm.  +Gram Negative Tao UTI  Hx of incomplete bladder emptying      - Continue broad spectrum antibiotics, follow Urine Culture and treat with culture sensitive antibiotics. - Hx of incomplete bladder emptying. Bladder scan 357 cc, unclear if scan was post-void. H/o axonal polyneuropathy, lumbar spine surgery. Moderate fecal retention on CT. Recommend continued colace/lactulose and Urodynamics study outpatient with  or [facility] urology. - Pyridium PRN dysuria.   - Once medically stable, recommend definitive stone treatment as they are likely contributing to her recurrent UTIs. Previously discussed possible PCNL for her left sided stone and URS for right sided stone. Had a long discussion with the patient and her spouse. She remains undecided whether she wants to do that here (she saw Dr Al Cadet last year) or at [facility] (followed by [facility]). No urgent intervention needed. She reports that she can call to scheduled once decided. [phone]. - Call with questions.   Kyle Beatty MD, Dr. Sue Payne.      Signed By: Teresa Max NP - December 4, 2020

## 2020-12-04 NOTE — PROGRESS NOTES
Bedside and Verbal shift change report given to Kamryn Dobson RN (oncoming nurse) by Tin Rodriguez (offgoing nurse). Report included the following information SBAR, Kardex, ED Summary, Intake/Output, MAR, Recent Results and Cardiac Rhythm NSR.

## 2020-12-05 ENCOUNTER — APPOINTMENT (OUTPATIENT)
Dept: GENERAL RADIOLOGY | Age: 75
DRG: 689 | End: 2020-12-05
Attending: FAMILY MEDICINE
Payer: MEDICARE

## 2020-12-05 LAB
ALBUMIN SERPL-MCNC: 1.8 G/DL (ref 3.5–5)
ALBUMIN/GLOB SERPL: 0.8 {RATIO} (ref 1.1–2.2)
ALP SERPL-CCNC: 65 U/L (ref 45–117)
ALT SERPL-CCNC: 11 U/L (ref 12–78)
ANION GAP SERPL CALC-SCNC: 8 MMOL/L (ref 5–15)
AST SERPL-CCNC: 8 U/L (ref 15–37)
BACTERIA SPEC CULT: NORMAL
BASOPHILS # BLD: 0 K/UL (ref 0–0.1)
BASOPHILS NFR BLD: 1 % (ref 0–1)
BILIRUB SERPL-MCNC: 0.3 MG/DL (ref 0.2–1)
BUN SERPL-MCNC: 28 MG/DL (ref 6–20)
BUN/CREAT SERPL: 51 (ref 12–20)
CALCIUM SERPL-MCNC: 8.5 MG/DL (ref 8.5–10.1)
CHLORIDE SERPL-SCNC: 118 MMOL/L (ref 97–108)
CO2 SERPL-SCNC: 22 MMOL/L (ref 21–32)
CREAT SERPL-MCNC: 0.55 MG/DL (ref 0.55–1.02)
DIFFERENTIAL METHOD BLD: ABNORMAL
EOSINOPHIL # BLD: 0.1 K/UL (ref 0–0.4)
EOSINOPHIL NFR BLD: 2 % (ref 0–7)
ERYTHROCYTE [DISTWIDTH] IN BLOOD BY AUTOMATED COUNT: 22.6 % (ref 11.5–14.5)
GLOBULIN SER CALC-MCNC: 2.3 G/DL (ref 2–4)
GLUCOSE SERPL-MCNC: 89 MG/DL (ref 65–100)
HCT VFR BLD AUTO: 25.8 % (ref 35–47)
HGB BLD-MCNC: 7.8 G/DL (ref 11.5–16)
IMM GRANULOCYTES # BLD AUTO: 0 K/UL
IMM GRANULOCYTES NFR BLD AUTO: 0 %
LYMPHOCYTES # BLD: 0.5 K/UL (ref 0.8–3.5)
LYMPHOCYTES NFR BLD: 10 % (ref 12–49)
MCH RBC QN AUTO: 33.6 PG (ref 26–34)
MCHC RBC AUTO-ENTMCNC: 30.2 G/DL (ref 30–36.5)
MCV RBC AUTO: 111.2 FL (ref 80–99)
MONOCYTES # BLD: 1.2 K/UL (ref 0–1)
MONOCYTES NFR BLD: 25 % (ref 5–13)
NEUTS BAND NFR BLD MANUAL: 6 % (ref 0–6)
NEUTS SEG # BLD: 2.8 K/UL (ref 1.8–8)
NEUTS SEG NFR BLD: 56 % (ref 32–75)
NRBC # BLD: 0.05 K/UL (ref 0–0.01)
NRBC BLD-RTO: 1.1 PER 100 WBC
PLATELET # BLD AUTO: 252 K/UL (ref 150–400)
PLATELET COMMENTS,PCOM: ABNORMAL
PMV BLD AUTO: 11 FL (ref 8.9–12.9)
POTASSIUM SERPL-SCNC: 4 MMOL/L (ref 3.5–5.1)
PROT SERPL-MCNC: 4.1 G/DL (ref 6.4–8.2)
RBC # BLD AUTO: 2.32 M/UL (ref 3.8–5.2)
RBC MORPH BLD: ABNORMAL
SERVICE CMNT-IMP: NORMAL
SODIUM SERPL-SCNC: 148 MMOL/L (ref 136–145)
WBC # BLD AUTO: 4.6 K/UL (ref 3.6–11)

## 2020-12-05 PROCEDURE — 80053 COMPREHEN METABOLIC PANEL: CPT

## 2020-12-05 PROCEDURE — 65660000000 HC RM CCU STEPDOWN

## 2020-12-05 PROCEDURE — 71045 X-RAY EXAM CHEST 1 VIEW: CPT

## 2020-12-05 PROCEDURE — 74011250636 HC RX REV CODE- 250/636: Performed by: FAMILY MEDICINE

## 2020-12-05 PROCEDURE — 74011250637 HC RX REV CODE- 250/637: Performed by: INTERNAL MEDICINE

## 2020-12-05 PROCEDURE — 74011250637 HC RX REV CODE- 250/637: Performed by: NURSE PRACTITIONER

## 2020-12-05 PROCEDURE — 74011250637 HC RX REV CODE- 250/637: Performed by: FAMILY MEDICINE

## 2020-12-05 PROCEDURE — 85025 COMPLETE CBC W/AUTO DIFF WBC: CPT

## 2020-12-05 PROCEDURE — 74011250636 HC RX REV CODE- 250/636: Performed by: INTERNAL MEDICINE

## 2020-12-05 PROCEDURE — 36415 COLL VENOUS BLD VENIPUNCTURE: CPT

## 2020-12-05 RX ADMIN — Medication: at 17:20

## 2020-12-05 RX ADMIN — Medication 10 ML: at 06:56

## 2020-12-05 RX ADMIN — IBUPROFEN 400 MG: 400 TABLET ORAL at 02:56

## 2020-12-05 RX ADMIN — IBUPROFEN 400 MG: 400 TABLET ORAL at 14:03

## 2020-12-05 RX ADMIN — PREGABALIN 150 MG: 75 CAPSULE ORAL at 21:20

## 2020-12-05 RX ADMIN — Medication: at 14:04

## 2020-12-05 RX ADMIN — HEPARIN SODIUM 5000 UNITS: 5000 INJECTION INTRAVENOUS; SUBCUTANEOUS at 00:28

## 2020-12-05 RX ADMIN — HEPARIN SODIUM 5000 UNITS: 5000 INJECTION INTRAVENOUS; SUBCUTANEOUS at 17:19

## 2020-12-05 RX ADMIN — SODIUM CHLORIDE 75 ML/HR: 900 INJECTION, SOLUTION INTRAVENOUS at 02:57

## 2020-12-05 RX ADMIN — HEPARIN SODIUM 5000 UNITS: 5000 INJECTION INTRAVENOUS; SUBCUTANEOUS at 09:27

## 2020-12-05 RX ADMIN — IBUPROFEN 400 MG: 400 TABLET ORAL at 19:28

## 2020-12-05 RX ADMIN — CEPHALEXIN 500 MG: 250 CAPSULE ORAL at 21:20

## 2020-12-05 RX ADMIN — Medication: at 21:21

## 2020-12-05 RX ADMIN — FAMOTIDINE 20 MG: 20 TABLET, FILM COATED ORAL at 09:25

## 2020-12-05 RX ADMIN — CEPHALEXIN 500 MG: 250 CAPSULE ORAL at 06:56

## 2020-12-05 RX ADMIN — Medication 10 ML: at 21:21

## 2020-12-05 RX ADMIN — CEPHALEXIN 500 MG: 250 CAPSULE ORAL at 14:03

## 2020-12-05 RX ADMIN — FAMOTIDINE 20 MG: 20 TABLET, FILM COATED ORAL at 17:19

## 2020-12-05 RX ADMIN — TBO-FILGRASTIM 300 MCG: 480 INJECTION, SOLUTION SUBCUTANEOUS at 19:28

## 2020-12-05 RX ADMIN — PREGABALIN 150 MG: 75 CAPSULE ORAL at 09:25

## 2020-12-05 NOTE — PROGRESS NOTES
Bedside and Verbal shift change report given to DEMI Abdalla (oncoming nurse) by Payal Dong RN (offgoing nurse). Report included the following information SBAR, Kardex, Procedure Summary, MAR, Recent Results and Cardiac Rhythm NSR.

## 2020-12-05 NOTE — PROGRESS NOTES
Hematology-Oncology Progress Note      Alfonso Hernández  1945  [id number]  12/5/2020      Subjective:     Weak, awake alert, feels some better, abdo pain is neg, reports small BM without pain      Allergies: Fentanyl; Celebrex [celecoxib];  Duloxetine; and Sulfa (sulfonamide antibiotics)  Current Facility-Administered Medications   Medication Dose Route Frequency Provider Last Rate Last Dose    cephALEXin (KEFLEX) capsule 500 mg  500 mg Oral Q8H Wilber Victor, DO   500 mg at 12/05/20 0656    famotidine (PEPCID) tablet 20 mg  20 mg Oral BID Whitney Magdaleno NP   20 mg at 12/05/20 0925    tbo-filgrastim (GRANIX) injection 300 mcg  300 mcg SubCUTAneous QPM Alessandra Lemon MD   300 mcg at 12/04/20 1731    lactulose (CHRONULAC) 10 gram/15 mL solution 15 mL  10 g Oral BID Alessandra Lemon MD   15 mL at 12/03/20 1700    pregabalin (LYRICA) capsule 150 mg  150 mg Oral BID Tc Barney MD   150 mg at 12/05/20 0925    ondansetron (ZOFRAN) injection 4 mg  4 mg IntraVENous Q4H PRN Clyde Herron NP   4 mg at 12/04/20 0853    ibuprofen (MOTRIN) tablet 400 mg  400 mg Oral Q4H PRN Clyde Herron NP   400 mg at 12/05/20 0256    phenazopyridine (PYRIDIUM) tablet 100 mg  100 mg Oral TID PRN Lenox Fleischer, NP   100 mg at 12/03/20 1732    balsam peru-castor oiL (VENELEX) ointment   Topical TID Rodney Garay MD        epoetin annie-epbx (RETACRIT) injection 20,000 Units  20,000 Units SubCUTAneous Q7D Brissa Davenport MD   20,000 Units at 12/03/20 2105    oxyCODONE IR (ROXICODONE) tablet 5 mg  5 mg Oral Q4H PRN Clyde Herron NP   5 mg at 12/01/20 2145    docusate sodium (COLACE) capsule 100 mg  100 mg Oral BID Lashae Hernandez MD   100 mg at 12/03/20 1700    sodium chloride (NS) flush 5-40 mL  5-40 mL IntraVENous Q8H Cesar Castillo MD   10 mL at 12/05/20 0656    sodium chloride (NS) flush 5-40 mL  5-40 mL IntraVENous PRN Lashae Hernandez MD        0.9% sodium chloride infusion  75 mL/hr IntraVENous CONTINUOUS Cisco Cardoza MD 75 mL/hr at 12/05/20 0257 75 mL/hr at 12/05/20 0257    acetaminophen (TYLENOL) tablet 650 mg  650 mg Oral Q4H PRN Cisco Cardoza MD   650 mg at 12/04/20 2127    heparin (porcine) injection 5,000 Units  5,000 Units SubCUTAneous German Sanchez MD   5,000 Units at 12/05/20 4241     Objective:     Patient Vitals for the past 24 hrs:   BP Temp Pulse Resp SpO2 Weight   12/05/20 1123     95 %    12/05/20 0905 (!) 146/73 97.5 °F (36.4 °C) 73 20 94 %    12/05/20 0317 127/71 97.7 °F (36.5 °C) 63 18 93 % 69.1 kg (152 lb 5.4 oz)   12/05/20 0023      69.8 kg (153 lb 12.8 oz)   12/04/20 2324 (!) 132/59 97.7 °F (36.5 °C) 67 16 90 %    12/04/20 1929 (!) 142/63 97.8 °F (36.6 °C) 70 18 91 %    12/04/20 1647 (!) 144/63 97.3 °F (36.3 °C) 74 16 90 %        Gen: NAD  HEENT: PERRL, Sclerae anicteric  Cv: RRR without m/r/g  Pulm: aerating well bilat  Abd: NABS, NTND, No HSM  Ext: No c/c/e    Available labs reviewed:  Labs:    Recent Results (from the past 24 hour(s))   METABOLIC PANEL, COMPREHENSIVE    Collection Time: 12/05/20  2:59 AM   Result Value Ref Range    Sodium 148 (H) 136 - 145 mmol/L    Potassium 4.0 3.5 - 5.1 mmol/L    Chloride 118 (H) 97 - 108 mmol/L    CO2 22 21 - 32 mmol/L    Anion gap 8 5 - 15 mmol/L    Glucose 89 65 - 100 mg/dL    BUN 28 (H) 6 - 20 MG/DL    Creatinine 0.55 0.55 - 1.02 MG/DL    BUN/Creatinine ratio 51 (H) 12 - 20      GFR est AA >60 >60 ml/min/1.73m2    GFR est non-AA >60 >60 ml/min/1.73m2    Calcium 8.5 8.5 - 10.1 MG/DL    Bilirubin, total 0.3 0.2 - 1.0 MG/DL    ALT (SGPT) 11 (L) 12 - 78 U/L    AST (SGOT) 8 (L) 15 - 37 U/L    Alk.  phosphatase 65 45 - 117 U/L    Protein, total 4.1 (L) 6.4 - 8.2 g/dL    Albumin 1.8 (L) 3.5 - 5.0 g/dL    Globulin 2.3 2.0 - 4.0 g/dL    A-G Ratio 0.8 (L) 1.1 - 2.2     CBC WITH AUTOMATED DIFF    Collection Time: 12/05/20  2:59 AM   Result Value Ref Range    WBC 4.6 3.6 - 11.0 K/uL    RBC 2.32 (L) 3.80 - 5.20 M/uL    HGB 7.8 (L) 11.5 - 16.0 g/dL    HCT 25.8 (L) 35.0 - 47.0 %    .2 (H) 80.0 - 99.0 FL    MCH 33.6 26.0 - 34.0 PG    MCHC 30.2 30.0 - 36.5 g/dL    RDW 22.6 (H) 11.5 - 14.5 %    PLATELET 598 683 - 513 K/uL    MPV 11.0 8.9 - 12.9 FL    NRBC 1.1 (H) 0  WBC    ABSOLUTE NRBC 0.05 (H) 0.00 - 0.01 K/uL    NEUTROPHILS 56 32 - 75 %    BAND NEUTROPHILS 6 0 - 6 %    LYMPHOCYTES 10 (L) 12 - 49 %    MONOCYTES 25 (H) 5 - 13 %    EOSINOPHILS 2 0 - 7 %    BASOPHILS 1 0 - 1 %    IMMATURE GRANULOCYTES 0 %    ABS. NEUTROPHILS 2.8 1.8 - 8.0 K/UL    ABS. LYMPHOCYTES 0.5 (L) 0.8 - 3.5 K/UL    ABS. MONOCYTES 1.2 (H) 0.0 - 1.0 K/UL    ABS. EOSINOPHILS 0.1 0.0 - 0.4 K/UL    ABS. BASOPHILS 0.0 0.0 - 0.1 K/UL    ABS. IMM. GRANS. 0.0 K/UL    DF MANUAL      PLATELET COMMENTS Large Platelets      RBC COMMENTS ANISOCYTOSIS  2+        RBC COMMENTS MACROCYTOSIS  2+        RBC COMMENTS OVALOCYTES  1+        RBC COMMENTS SCHISTOCYTES  PRESENT             Assessment and Plan     71 y/o woman with MDS (MLD-RS, low risk) on outpatient procrit/luspatercept, now admitted with questionable UTI and confusion. 1. Confusion: seen by Neuro, MRI brain ordered and pending. CT normal. This appears to be resolving    2. MDS: hgb 8.4-->7.8 this am,,, continue procrit. ..she has responded well to grannix in the out patient setting nicely on three occasions. Neeru Mccray given the UTI and severe neutropenia,  starteddrug on 12/3 and the count has increased. . continue grannix. ANC at 2.6  -E Coli UTI with bland UA - Being treated per ID given neutropenia and fevers on admit. Abx tailored to cephalexin - finish 12/9.      3. Bladder outlet obsruction:Dr. Matt Christiansen saw her and offered lithotripsy for non obstructing stones. . she is considering this    4. Abd. Pain/bloating. . this is a chronic concern of hers. Neeru Gold I had offered w/u with CT in the office in October but she wanted [facility] urology to work this up. CT done this admit.  Shows non obstructing renal calculi,.,, no ascites or sign of malignancy    May need [facility], available Sunday if needed. Will review chart in [facility]. We will fu on Monday    Briseyda Gutierrez MD

## 2020-12-05 NOTE — PROGRESS NOTES
Hospitalist Progress Note    NAME: Juany Perez   :  1945   MRN:  [id number]       Assessment / Plan:  LLL PNA: neutropenic fever, bone marrow dysfunction that was attributed to Bactrim. -Continue telemetry monitor  -IVF's, continue IV fluid, low urine output today  -SARS-CoV-2: negative  -oxygen support, pulse ox monitoring  -pro-Vlad level, 0.11, on 2020  -CT chest: Bilateral pleural effusions with overlying atelectasis. Coronary artery disease. Nonobstructed bilateral nephrolithiasis. Moderate rectal fecal retention. -ID following, appreciated recommendation  Continue cefepime. Discussed with ID, plan for p.o. antibiotic for 7 days.     Acute metabolic encephalopathy: Improved, due to above. -CT head: Indeterminate hyperdensity just above the sella turcica posteriorly. Contrast-enhanced MRI of the brain for further delineation recommended, findings consistent with mild chronic microvascular ischemic change, moderate to severe temporal predominant degree of cerebral atrophy.  -Neuro following: MRI C spine, Brain  Appreciated infectious disease recommendation. Continues Keflex. -IVF's  -neurovascular checks  -MRI brain: pending  -MRI C spine: r/o cord compression-pending  -TSH: 1.64  -B12: 2,000.   -fall precautions  -aspiration precautions, symptoms persist may consider further intervention and diagnostics,  final urine culture pending  -BCNGTD, MRSA- negative     Neutropenic Fever:  No fever last 24 hours  -monitor CBC  -Heme-onc consult:  Appreciated recommendation     Myelodysplastic syndrome: Appreciated recommendation,  Anemia: Chronic vs Acute, no obvious signs of bleeding.  -monitor CBC  -Heme-onc consult     Chronic Leukopenia/ Neutropenia: following adverse reaction to sulfa drug. Heme/Onc.   Chronic UTI's, Hx Nephrolithiasis and Bladder Obstruction:  Urology following  Acute Hypoxic Respiratory Failure: Resolved, on room oxygen  -ABG stable  -plan as above     Constipation: Patient has bowel movement now. POA Left lower leg, abrasion from fall: 5 x 3 x 0.1 cm, 100% pink, no exudate, no odor. Periwound intact from erythema.  -daily cleanse with saline; apply Xeroform and dry dressing.  -Sacrum, buttocks and heels: Venelex TID. 12/5/2020: Shortness of breath: On oxygen by nasal cannula 2 L. Maintains good saturations. Chest x-ray. Incentive spirometry. Lung sounds clear at this time. DVTppx: Heparin  Code Status: Full Code  Diet: Cardiac  Activity: OOB to chair TID and PRN  Discharge: Inpatient rehab. Subjective:     Chief Complaint / Reason for Physician Visit  \"Reports some shortness of breath today when woke up. \". Discussed with RN events overnight. Review of Systems:  Symptom Y/N Comments  Symptom Y/N Comments   Fever/Chills    Chest Pain     Poor Appetite    Edema     Cough    Abdominal Pain     Sputum    Joint Pain     SOB/HALL    Pruritis/Rash     Nausea/vomit    Tolerating PT/OT     Diarrhea    Tolerating Diet     Constipation    Other       Could NOT obtain due to:      Objective:     VITALS:   Last 24hrs VS reviewed since prior progress note. Most recent are:  Patient Vitals for the past 24 hrs:   Temp Pulse Resp BP SpO2   12/05/20 0905 97.5 °F (36.4 °C) 73 20 (!) 146/73 94 %   12/05/20 0317 97.7 °F (36.5 °C) 63 18 127/71 93 %   12/04/20 2324 97.7 °F (36.5 °C) 67 16 (!) 132/59 90 %   12/04/20 1929 97.8 °F (36.6 °C) 70 18 (!) 142/63 91 %   12/04/20 1647 97.3 °F (36.3 °C) 74 16 (!) 144/63 90 %       Intake/Output Summary (Last 24 hours) at 12/5/2020 1123  Last data filed at 12/5/2020 4813  Gross per 24 hour   Intake 60 ml   Output 400 ml   Net -340 ml        I had a face to face encounter and independently examined this patient on 12/5/2020, as outlined below:  PHYSICAL EXAM:  General: Ill looking patient, frail. Alert, cooperative, no acute distress    EENT:  EOMI. Anicteric sclerae. MMM  Resp:  CTA bilaterally, no wheezing or rales.   No accessory muscle use  CV:  Regular  rhythm,  No edema  GI:  Soft, Non distended, Non tender. +Bowel sounds  Neurologic:  Alert and oriented X 3, normal speech,   Psych:   Good insight. Not anxious nor agitated  Skin:  No rashes. No jaundice    Reviewed most current lab test results and cultures  YES  Reviewed most current radiology test results   YES  Review and summation of old records today    NO  Reviewed patient's current orders and MAR    YES  PMH/SH reviewed - no change compared to H&P  ________________________________________________________________________  Care Plan discussed with:    Comments   Patient     Family      RN     Care Manager     Consultant                        Multidiciplinary team rounds were held today with , nursing, pharmacist and clinical coordinator. Patient's plan of care was discussed; medications were reviewed and discharge planning was addressed. ________________________________________________________________________  Total NON critical care TIME: 40 minutes    Total CRITICAL CARE TIME Spent:   Minutes non procedure based      Comments   >50% of visit spent in counseling and coordination of care     ________________________________________________________________________  Pj De Los Santos MD     Procedures: see electronic medical records for all procedures/Xrays and details which were not copied into this note but were reviewed prior to creation of Plan. LABS:  I reviewed today's most current labs and imaging studies.   Pertinent labs include:  Recent Labs     12/05/20  0259 12/04/20  0624 12/04/20  0413   WBC 4.6 3.0* PLEASE DISREGARD RESULTS   HGB 7.8* 8.4* PLEASE DISREGARD RESULTS   HCT 25.8* 27.3* PLEASE DISREGARD RESULTS    276 PLEASE DISREGARD RESULTS     Recent Labs     12/05/20  0259 12/04/20  0413 12/03/20  0348   * 146* 147*   K 4.0 4.0 4.2   * 118* 119*   CO2 22 20* 21   GLU 89 110* 83   BUN 28* 26* 24*   CREA 0.55 0.55 0.48*   CA 8.5 8.6 8.3* ALB 1.8* 1.8* 1.6*   TBILI 0.3 0.3 0.4   ALT 11* 13 12       Signed: Carol Andrea MD

## 2020-12-05 NOTE — PROGRESS NOTES
Bedside shift change report given to Erica Wagner (oncoming nurse) by Brinda Parson RN (offgoing nurse). Report included the following information SBAR, ED Summary, Intake/Output, MAR, Med Rec Status and Cardiac Rhythm NSR   Last 3 Recorded Weights in this Encounter    12/04/20 0411 12/05/20 0023 12/05/20 0317   Weight: 68.7 kg (151 lb 7.3 oz) 69.8 kg (153 lb 12.8 oz) 69.1 kg (152 lb 5.4 oz)     . Problem: Falls - Risk of  Goal: *Absence of Falls  Description: Document Joon Lermas Fall Risk and appropriate interventions in the flowsheet. Outcome: Progressing Towards Goal  Note: Fall Risk Interventions:  Mobility Interventions: Communicate number of staff needed for ambulation/transfer, Patient to call before getting OOB, PT Consult for mobility concerns    Mentation Interventions: Adequate sleep, hydration, pain control, Familiar objects from home, Family/sitter at bedside, Eyeglasses and hearing aids, Reorient patient, Toileting rounds, Update white board    Medication Interventions: Evaluate medications/consider consulting pharmacy, Patient to call before getting OOB    Elimination Interventions: Call light in reach, Patient to call for help with toileting needs    History of Falls Interventions: Vital signs minimum Q4HRs X 24 hrs (comment for end date), Consult care management for discharge planning, Door open when patient unattended         Problem: Pressure Injury - Risk of  Goal: *Prevention of pressure injury  Description: Document Neeraj Scale and appropriate interventions in the flowsheet.   Outcome: Progressing Towards Goal  Note: Pressure Injury Interventions:  Sensory Interventions: Assess changes in LOC, Assess need for specialty bed, Discuss PT/OT consult with provider, Keep linens dry and wrinkle-free, Pressure redistribution bed/mattress (bed type), Minimize linen layers    Moisture Interventions: Absorbent underpads, Apply protective barrier, creams and emollients, Check for incontinence Q2 hours and as needed, Internal/External urinary devices, Maintain skin hydration (lotion/cream), Minimize layers    Activity Interventions: Pressure redistribution bed/mattress(bed type), PT/OT evaluation    Mobility Interventions: Pressure redistribution bed/mattress (bed type), PT/OT evaluation    Nutrition Interventions: Document food/fluid/supplement intake    Friction and Shear Interventions: Minimize layers, HOB 30 degrees or less  Problem: Pain  Goal: *Control of Pain  Outcome: Progressing Towards Goal

## 2020-12-05 NOTE — PROGRESS NOTES
Bedside and Verbal shift change report given to [place] (oncoming nurse) by Jasvir Crump (offgoing nurse). Report included the following information SBAR, Kardex, OR Summary, Intake/Output, MAR, Recent Results and Cardiac Rhythm NSR.

## 2020-12-06 PROCEDURE — 94761 N-INVAS EAR/PLS OXIMETRY MLT: CPT

## 2020-12-06 PROCEDURE — 74011250637 HC RX REV CODE- 250/637: Performed by: FAMILY MEDICINE

## 2020-12-06 PROCEDURE — 74011250637 HC RX REV CODE- 250/637: Performed by: NURSE PRACTITIONER

## 2020-12-06 PROCEDURE — 94762 N-INVAS EAR/PLS OXIMTRY CONT: CPT

## 2020-12-06 PROCEDURE — 74011250636 HC RX REV CODE- 250/636: Performed by: FAMILY MEDICINE

## 2020-12-06 PROCEDURE — 77010033678 HC OXYGEN DAILY

## 2020-12-06 PROCEDURE — 74011250636 HC RX REV CODE- 250/636: Performed by: INTERNAL MEDICINE

## 2020-12-06 PROCEDURE — 74011250637 HC RX REV CODE- 250/637: Performed by: INTERNAL MEDICINE

## 2020-12-06 PROCEDURE — 65660000000 HC RM CCU STEPDOWN

## 2020-12-06 RX ORDER — FUROSEMIDE 10 MG/ML
10 INJECTION INTRAMUSCULAR; INTRAVENOUS 2 TIMES DAILY
Status: DISCONTINUED | OUTPATIENT
Start: 2020-12-06 | End: 2020-12-06

## 2020-12-06 RX ADMIN — Medication 10 ML: at 05:22

## 2020-12-06 RX ADMIN — FAMOTIDINE 20 MG: 20 TABLET, FILM COATED ORAL at 17:52

## 2020-12-06 RX ADMIN — IBUPROFEN 400 MG: 400 TABLET ORAL at 18:12

## 2020-12-06 RX ADMIN — PREGABALIN 150 MG: 75 CAPSULE ORAL at 09:24

## 2020-12-06 RX ADMIN — CEPHALEXIN 500 MG: 250 CAPSULE ORAL at 14:10

## 2020-12-06 RX ADMIN — HEPARIN SODIUM 5000 UNITS: 5000 INJECTION INTRAVENOUS; SUBCUTANEOUS at 14:11

## 2020-12-06 RX ADMIN — IBUPROFEN 400 MG: 400 TABLET ORAL at 14:11

## 2020-12-06 RX ADMIN — Medication 10 ML: at 21:55

## 2020-12-06 RX ADMIN — IBUPROFEN 400 MG: 400 TABLET ORAL at 21:55

## 2020-12-06 RX ADMIN — TBO-FILGRASTIM 300 MCG: 480 INJECTION, SOLUTION SUBCUTANEOUS at 17:52

## 2020-12-06 RX ADMIN — PREGABALIN 150 MG: 75 CAPSULE ORAL at 21:54

## 2020-12-06 RX ADMIN — CEPHALEXIN 500 MG: 250 CAPSULE ORAL at 05:21

## 2020-12-06 RX ADMIN — HEPARIN SODIUM 5000 UNITS: 5000 INJECTION INTRAVENOUS; SUBCUTANEOUS at 04:17

## 2020-12-06 RX ADMIN — DOCUSATE SODIUM 100 MG: 100 CAPSULE, LIQUID FILLED ORAL at 09:22

## 2020-12-06 RX ADMIN — DOCUSATE SODIUM 100 MG: 100 CAPSULE, LIQUID FILLED ORAL at 17:52

## 2020-12-06 RX ADMIN — Medication: at 21:56

## 2020-12-06 RX ADMIN — Medication: at 17:52

## 2020-12-06 RX ADMIN — CEPHALEXIN 500 MG: 250 CAPSULE ORAL at 21:54

## 2020-12-06 RX ADMIN — FAMOTIDINE 20 MG: 20 TABLET, FILM COATED ORAL at 09:24

## 2020-12-06 RX ADMIN — IBUPROFEN 400 MG: 400 TABLET ORAL at 05:20

## 2020-12-06 RX ADMIN — Medication 10 ML: at 14:11

## 2020-12-06 RX ADMIN — Medication: at 09:25

## 2020-12-06 NOTE — PROGRESS NOTES
Problem: Falls - Risk of  Goal: *Absence of Falls  Description: Document Elida Romero Fall Risk and appropriate interventions in the flowsheet. Outcome: Progressing Towards Goal  Note: Fall Risk Interventions:  Mobility Interventions: Communicate number of staff needed for ambulation/transfer    Mentation Interventions: Adequate sleep, hydration, pain control    Medication Interventions: Teach patient to arise slowly    Elimination Interventions: Call light in reach    History of Falls Interventions: Bed/chair exit alarm         Problem: Pressure Injury - Risk of  Goal: *Prevention of pressure injury  Description: Document Neeraj Scale and appropriate interventions in the flowsheet. Outcome: Progressing Towards Goal  Note: Pressure Injury Interventions:  Sensory Interventions: Assess changes in LOC    Moisture Interventions: Absorbent underpads    Activity Interventions: Pressure redistribution bed/mattress(bed type)    Mobility Interventions: Float heels    Nutrition Interventions: Document food/fluid/supplement intake    Friction and Shear Interventions: HOB 30 degrees or less    Bedside and Verbal shift change report given to Alex Matos (oncoming nurse) by Susan Stewart (offgoing nurse). Report included the following information SBAR, Kardex, Intake/Output, MAR, Accordion, Recent Results, Med Rec Status and Cardiac Rhythm NSR.

## 2020-12-06 NOTE — PROGRESS NOTES
Problem: Falls - Risk of  Goal: *Absence of Falls  Description: Document Mario Solomon Fall Risk and appropriate interventions in the flowsheet. Outcome: Progressing Towards Goal  Note: Fall Risk Interventions:  Mobility Interventions: Communicate number of staff needed for ambulation/transfer    Mentation Interventions: Adequate sleep, hydration, pain control, Familiar objects from home, Room close to nurse's station, Reorient patient, More frequent rounding, Increase mobility, Toileting rounds    Medication Interventions: Teach patient to arise slowly, Patient to call before getting OOB    Elimination Interventions: Bed/chair exit alarm, Call light in reach, Patient to call for help with toileting needs, Stay With Me (per policy), Toilet paper/wipes in reach, Toileting schedule/hourly rounds    History of Falls Interventions: Investigate reason for fall, Room close to nurse's station, Utilize gait belt for transfer/ambulation         Problem: Pressure Injury - Risk of  Goal: *Prevention of pressure injury  Description: Document Neeraj Scale and appropriate interventions in the flowsheet. Outcome: Progressing Towards Goal  Note: Pressure Injury Interventions:  Sensory Interventions: Assess need for specialty bed, Assess changes in LOC, Chair cushion, Check visual cues for pain, Maintain/enhance activity level, Keep linens dry and wrinkle-free, Monitor skin under medical devices, Minimize linen layers    Moisture Interventions: Internal/External urinary devices, Maintain skin hydration (lotion/cream), Limit adult briefs, Moisture barrier, Minimize layers, Offer toileting Q_hr, Absorbent underpads, Apply protective barrier, creams and emollients, Check for incontinence Q2 hours and as needed    Activity Interventions: Increase time out of bed, Pressure redistribution bed/mattress(bed type), PT/OT evaluation    Mobility Interventions: Float heels, Turn and reposition approx.  every two hours(pillow and wedges)    Nutrition Interventions: Document food/fluid/supplement intake, Discuss nutritional consult with provider    Friction and Shear Interventions: Apply protective barrier, creams and emollients, HOB 30 degrees or less                Problem: Pain  Goal: *Control of Pain  Outcome: Progressing Towards Goal

## 2020-12-06 NOTE — PROGRESS NOTES
Continues to use incentive spirometer, increased from yesterday when she was at 250, today she is getting up to 600. Sat on the edge of the bed for 3 minutes, moderate assist to turn hips in the bed. Able to sit with legs over the edge without support. Performed range of motion of both legs an arms. Has intermittently napping.

## 2020-12-06 NOTE — PROGRESS NOTES
Hospitalist Progress Note    NAME: Atul Pryor   :  1945   MRN:  [id number]       Assessment / Plan:  LLL PNA: neutropenic fever, bone marrow dysfunction that was attributed to Bactrim. -Continue telemetry monitor  -IVF's, continue IV fluid, low urine output today  -SARS-CoV-2: negative  -oxygen support, pulse ox monitoring  -pro-Vlad level, 0.11, on 2020  -CT chest: Bilateral pleural effusions with overlying atelectasis. Coronary artery disease. Nonobstructed bilateral nephrolithiasis. Moderate rectal fecal retention. -ID following, appreciated recommendation  Continue cefepime. Discussed with ID, plan for p.o. antibiotic for 7 days.     Acute metabolic encephalopathy: Improved, due to above. -CT head: Indeterminate hyperdensity just above the sella turcica posteriorly. Contrast-enhanced MRI of the brain for further delineation recommended, findings consistent with mild chronic microvascular ischemic change, moderate to severe temporal predominant degree of cerebral atrophy.  -Neuro following: MRI C spine, Brain  Appreciated infectious disease recommendation. Continues Keflex. -IVF's  -neurovascular checks  -MRI brain: pending  -MRI C spine: r/o cord compression-pending  -TSH: 1.64  -B12: 2,000.   -fall precautions  -aspiration precautions, symptoms persist may consider further intervention and diagnostics,  final urine culture pending  -BCNGTD, MRSA- negative     Neutropenic Fever:  No fever last 24 hours  -monitor CBC  -Heme-onc consult:  Appreciated recommendation     Myelodysplastic syndrome: Appreciated recommendation,  Anemia: Chronic vs Acute, no obvious signs of bleeding.  -monitor CBC  -Heme-onc consult     Chronic Leukopenia/ Neutropenia: following adverse reaction to sulfa drug. Heme/Onc.   Chronic UTI's, Hx Nephrolithiasis and Bladder Obstruction:  Urology following  Acute Hypoxic Respiratory Failure: Resolved, on room oxygen  -ABG stable  -plan as above     Constipation: Patient has bowel movement now. POA Left lower leg, abrasion from fall: 5 x 3 x 0.1 cm, 100% pink, no exudate, no odor. Periwound intact from erythema.  -daily cleanse with saline; apply Xeroform and dry dressing.  -Sacrum, buttocks and heels: Venelex TID. 12/5/2020: Shortness of breath: On oxygen by nasal cannula 2 L. Maintains good saturations. Chest x-ray. Incentive spirometry. Lung sounds clear at this time. 12/6/2020: More alert today. Reports feeling better. 's report that she was more energetic last night. Denies shortness of breath at rest.  Her lungs clear to auscultation does not have inspiratory crackles. Will continue monitoring with stop IV fluid  DVTppx: Heparin  Code Status: Full Code  Diet: Cardiac  Activity: OOB to chair TID and PRN  Discharge: Inpatient rehab. Subjective:     Chief Complaint / Reason for Physician Visit  \"Feeling better. Reports some swelling in her arms and legs. At rest in supine no shortness of breath on oxygen. \". Discussed with RN events overnight. Review of Systems:  Symptom Y/N Comments  Symptom Y/N Comments   Fever/Chills    Chest Pain     Poor Appetite    Edema     Cough    Abdominal Pain     Sputum    Joint Pain     SOB/HALL    Pruritis/Rash     Nausea/vomit    Tolerating PT/OT     Diarrhea    Tolerating Diet     Constipation    Other       Could NOT obtain due to:      Objective:     VITALS:   Last 24hrs VS reviewed since prior progress note.  Most recent are:  Patient Vitals for the past 24 hrs:   Temp Pulse Resp BP SpO2   12/06/20 0730 98.2 °F (36.8 °C) 70 18 (!) 142/63 94 %   12/06/20 0337 98 °F (36.7 °C) 63 16 (!) 154/71 95 %   12/05/20 2353    (!) 153/74    12/05/20 2350 98.7 °F (37.1 °C) 78 16 (!) 158/72 96 %   12/05/20 2000 97.6 °F (36.4 °C) 63 18 (!) 142/81 92 %   12/05/20 1620 98.7 °F (37.1 °C) 90 18 (!) 153/69 96 %   12/05/20 1458 97.6 °F (36.4 °C) 73 16 139/62 95 %       Intake/Output Summary (Last 24 hours) at 12/6/2020 [address] filed at 12/5/2020 2000  Gross per 24 hour   Intake 240 ml   Output 200 ml   Net 40 ml        I had a face to face encounter and independently examined this patient on 12/6/2020, as outlined below:  PHYSICAL EXAM:  General: Ill looking patient, frail. Alert, cooperative, no acute distress    EENT:  EOMI. Anicteric sclerae. MMM  Resp:  CTA bilaterally, no wheezing or rales. No accessory muscle use  CV:  Regular  rhythm,  No edema  GI:  Soft, Non distended, Non tender. +Bowel sounds  Neurologic:  Alert and oriented X 3, normal speech,   Psych:   Good insight. Not anxious nor agitated  Skin:  No rashes. No jaundice    Reviewed most current lab test results and cultures  YES  Reviewed most current radiology test results   YES  Review and summation of old records today    NO  Reviewed patient's current orders and MAR    YES  PMH/ reviewed - no change compared to H&P  ________________________________________________________________________  Care Plan discussed with:    Comments   Patient     Family      RN     Care Manager     Consultant                        Multidiciplinary team rounds were held today with , nursing, pharmacist and clinical coordinator. Patient's plan of care was discussed; medications were reviewed and discharge planning was addressed. ________________________________________________________________________  Total NON critical care TIME: 40 minutes    Total CRITICAL CARE TIME Spent:   Minutes non procedure based      Comments   >50% of visit spent in counseling and coordination of care     ________________________________________________________________________  Damon Pablo MD     Procedures: see electronic medical records for all procedures/Xrays and details which were not copied into this note but were reviewed prior to creation of Plan. LABS:  I reviewed today's most current labs and imaging studies.   Pertinent labs include:  Recent Labs 12/05/20 0259 12/04/20 0624 12/04/20 0413   WBC 4.6 3.0* PLEASE DISREGARD RESULTS   HGB 7.8* 8.4* PLEASE DISREGARD RESULTS   HCT 25.8* 27.3* PLEASE DISREGARD RESULTS    276 PLEASE DISREGARD RESULTS     Recent Labs     12/05/20 0259 12/04/20 0413   * 146*   K 4.0 4.0   * 118*   CO2 22 20*   GLU 89 110*   BUN 28* 26*   CREA 0.55 0.55   CA 8.5 8.6   ALB 1.8* 1.8*   TBILI 0.3 0.3   ALT 11* 13       Signed: Sandy Capellan MD

## 2020-12-07 ENCOUNTER — APPOINTMENT (OUTPATIENT)
Dept: NON INVASIVE DIAGNOSTICS | Age: 75
DRG: 689 | End: 2020-12-07
Attending: FAMILY MEDICINE
Payer: MEDICARE

## 2020-12-07 LAB
ECHO AV PEAK GRADIENT: 4.95 MMHG
ECHO AV PEAK VELOCITY: 111.2 CM/S
ECHO LV INTERNAL DIMENSION DIASTOLIC: 3.39 CM (ref 3.9–5.3)
ECHO LV INTERNAL DIMENSION SYSTOLIC: 2.03 CM
ECHO LV IVSD: 1.05 CM (ref 0.6–0.9)
ECHO LV MASS 2D: 95.6 G (ref 67–162)
ECHO LV MASS INDEX 2D: 54.6 G/M2 (ref 43–95)
ECHO LV POSTERIOR WALL DIASTOLIC: 0.91 CM (ref 0.6–0.9)
ECHO LVOT PEAK GRADIENT: 4.68 MMHG
ECHO LVOT PEAK VELOCITY: 108.14 CM/S
ECHO MV A VELOCITY: 89.6 CM/S
ECHO MV AREA PHT: 6.74 CM2
ECHO MV E DECELERATION TIME (DT): 112.48 MS
ECHO MV E VELOCITY: 48.1 CM/S
ECHO MV E/A RATIO: 0.54
ECHO MV PRESSURE HALF TIME (PHT): 32.62 MS
ECHO PV PEAK INSTANTANEOUS GRADIENT SYSTOLIC: 4.92 MMHG
ECHO RV TAPSE: 1.87 CM (ref 1.5–2)

## 2020-12-07 PROCEDURE — 77030038269 HC DRN EXT URIN PURWCK BARD -A

## 2020-12-07 PROCEDURE — 74011250637 HC RX REV CODE- 250/637: Performed by: NURSE PRACTITIONER

## 2020-12-07 PROCEDURE — 74011250637 HC RX REV CODE- 250/637: Performed by: INTERNAL MEDICINE

## 2020-12-07 PROCEDURE — 74011250637 HC RX REV CODE- 250/637: Performed by: FAMILY MEDICINE

## 2020-12-07 PROCEDURE — 97110 THERAPEUTIC EXERCISES: CPT

## 2020-12-07 PROCEDURE — 93306 TTE W/DOPPLER COMPLETE: CPT | Performed by: SPECIALIST

## 2020-12-07 PROCEDURE — 97112 NEUROMUSCULAR REEDUCATION: CPT

## 2020-12-07 PROCEDURE — 97530 THERAPEUTIC ACTIVITIES: CPT

## 2020-12-07 PROCEDURE — 74011000250 HC RX REV CODE- 250: Performed by: FAMILY MEDICINE

## 2020-12-07 PROCEDURE — 74011250636 HC RX REV CODE- 250/636: Performed by: INTERNAL MEDICINE

## 2020-12-07 PROCEDURE — 74011250636 HC RX REV CODE- 250/636: Performed by: NURSE PRACTITIONER

## 2020-12-07 PROCEDURE — 74011250636 HC RX REV CODE- 250/636: Performed by: FAMILY MEDICINE

## 2020-12-07 PROCEDURE — 65660000000 HC RM CCU STEPDOWN

## 2020-12-07 PROCEDURE — 97535 SELF CARE MNGMENT TRAINING: CPT

## 2020-12-07 PROCEDURE — C8929 TTE W OR WO FOL WCON,DOPPLER: HCPCS

## 2020-12-07 RX ORDER — FUROSEMIDE 20 MG/1
20 TABLET ORAL
Status: DISCONTINUED | OUTPATIENT
Start: 2020-12-07 | End: 2020-12-11 | Stop reason: HOSPADM

## 2020-12-07 RX ADMIN — PREGABALIN 150 MG: 75 CAPSULE ORAL at 08:48

## 2020-12-07 RX ADMIN — CEPHALEXIN 500 MG: 250 CAPSULE ORAL at 13:50

## 2020-12-07 RX ADMIN — Medication: at 17:35

## 2020-12-07 RX ADMIN — FUROSEMIDE 20 MG: 20 TABLET ORAL at 17:36

## 2020-12-07 RX ADMIN — IBUPROFEN 400 MG: 400 TABLET ORAL at 08:55

## 2020-12-07 RX ADMIN — IBUPROFEN 400 MG: 400 TABLET ORAL at 21:37

## 2020-12-07 RX ADMIN — CEPHALEXIN 500 MG: 250 CAPSULE ORAL at 07:21

## 2020-12-07 RX ADMIN — FAMOTIDINE 20 MG: 20 TABLET, FILM COATED ORAL at 17:36

## 2020-12-07 RX ADMIN — ONDANSETRON 4 MG: 2 INJECTION INTRAMUSCULAR; INTRAVENOUS at 13:58

## 2020-12-07 RX ADMIN — FAMOTIDINE 20 MG: 20 TABLET, FILM COATED ORAL at 08:48

## 2020-12-07 RX ADMIN — PREGABALIN 150 MG: 75 CAPSULE ORAL at 21:37

## 2020-12-07 RX ADMIN — CEPHALEXIN 500 MG: 250 CAPSULE ORAL at 21:37

## 2020-12-07 RX ADMIN — Medication: at 21:37

## 2020-12-07 RX ADMIN — FUROSEMIDE 20 MG: 20 TABLET ORAL at 10:00

## 2020-12-07 RX ADMIN — LACTULOSE 15 ML: 20 SOLUTION ORAL at 17:36

## 2020-12-07 RX ADMIN — DOCUSATE SODIUM 100 MG: 100 CAPSULE, LIQUID FILLED ORAL at 17:36

## 2020-12-07 RX ADMIN — TBO-FILGRASTIM 300 MCG: 480 INJECTION, SOLUTION SUBCUTANEOUS at 18:47

## 2020-12-07 RX ADMIN — Medication 10 ML: at 14:00

## 2020-12-07 RX ADMIN — IBUPROFEN 400 MG: 400 TABLET ORAL at 13:50

## 2020-12-07 RX ADMIN — Medication 10 ML: at 21:37

## 2020-12-07 RX ADMIN — Medication: at 09:00

## 2020-12-07 RX ADMIN — Medication 10 ML: at 07:20

## 2020-12-07 RX ADMIN — LACTULOSE 15 ML: 20 SOLUTION ORAL at 09:00

## 2020-12-07 RX ADMIN — SODIUM CHLORIDE 1.5 ML: 9 INJECTION INTRAMUSCULAR; INTRAVENOUS; SUBCUTANEOUS at 17:00

## 2020-12-07 NOTE — PROGRESS NOTES
Spiritual Care Assessment/Progress Note  [facility]      NAME: Vicenta Hendrix      MRN: [id number]  AGE: 70 y.o. SEX: female  Lutheran Affiliation: Yazdanism   Language: English     12/7/2020     Total Time (in minutes): 5     Spiritual Assessment begun in [facility] through conversation with:         []Patient        [] Family    [] Friend(s)        Reason for Consult: Initial/Spiritual assessment, patient floor     Spiritual beliefs: (Please include comment if needed)     [] Identifies with a rian tradition:         [] Supported by a rian community:            [] Claims no spiritual orientation:           [] Seeking spiritual identity:                [] Adheres to an individual form of spirituality:           [x] Not able to assess:                           Identified resources for coping:      [] Prayer                               [] Music                  [] Guided Imagery     [] Family/friends                 [] Pet visits     [] Devotional reading                         [x] Unknown     [] Other:                                              Interventions offered during this visit: (See comments for more details)                Plan of Care:     [] Support spiritual and/or cultural needs    [] Support AMD and/or advance care planning process      [] Support grieving process   [] Coordinate Rites and/or Rituals    [] Coordination with community clergy   [] No spiritual needs identified at this time   [] Detailed Plan of Care below (See Comments)  [] Make referral to Music Therapy  [] Make referral to Pet Therapy     [] Make referral to Addiction services  [] Make referral to [facility]  [] Make referral to Spiritual Care Partner  [] No future visits requested        [x] Follow up upon further referrals     Comments:  visit for initial spiritual assessment. Patient resting, appears comfortable. Did not awaken to verbal greeting or presence in room.   Please contact the  for referral or consult. Visited by Rev. Eitan Friend MDiv, [facility], [facility]   paging service: [phone])

## 2020-12-07 NOTE — PROGRESS NOTES
Hematology-Oncology Progress Note      Vicenta Hendrix  1945  [id number]  12/7/2020      Subjective:     Weak, awake alert,working with PT this am      Allergies: Fentanyl; Celebrex [celecoxib];  Duloxetine; and Sulfa (sulfonamide antibiotics)  Current Facility-Administered Medications   Medication Dose Route Frequency Provider Last Rate Last Dose    furosemide (LASIX) tablet 20 mg  20 mg Oral ACB&D Sera Guerrier MD        cephALEXin (KEFLEX) capsule 500 mg  500 mg Oral Q8H Warden Maine DO   500 mg at 12/07/20 0721    famotidine (PEPCID) tablet 20 mg  20 mg Oral BID Cecil Magdaleno, NP   20 mg at 12/07/20 0848    tbo-filgrastim (GRANIX) injection 300 mcg  300 mcg SubCUTAneous QPM Ilda Stallworth MD   300 mcg at 12/06/20 1752    lactulose (CHRONULAC) 10 gram/15 mL solution 15 mL  10 g Oral BID Ilda Stallworth MD   15 mL at 12/07/20 0900    pregabalin (LYRICA) capsule 150 mg  150 mg Oral BID Sera Guerrier MD   150 mg at 12/07/20 0848    ondansetron (ZOFRAN) injection 4 mg  4 mg IntraVENous Q4H PRN Kacie Dress, NP   4 mg at 12/04/20 0853    ibuprofen (MOTRIN) tablet 400 mg  400 mg Oral Q4H PRN Kacie Dress, NP   400 mg at 12/07/20 0855    phenazopyridine (PYRIDIUM) tablet 100 mg  100 mg Oral TID PRN Prashant Harm, NP   100 mg at 12/03/20 1205    balsam peru-castor oiL (VENELEX) ointment   Topical TID Alfonso Ladd MD        epoetin annie-epbx (RETACRIT) injection 20,000 Units  20,000 Units SubCUTAneous Q7D Yamile Van MD   20,000 Units at 12/03/20 2105    oxyCODONE IR (ROXICODONE) tablet 5 mg  5 mg Oral Q4H PRN Kacie Dress, NP   5 mg at 12/01/20 2145    docusate sodium (COLACE) capsule 100 mg  100 mg Oral BID Delores Funes MD   Stopped at 12/07/20 0900    sodium chloride (NS) flush 5-40 mL  5-40 mL IntraVENous Q8H Delores Funes MD   10 mL at 12/07/20 0720    sodium chloride (NS) flush 5-40 mL  5-40 mL IntraVENous PRN Delores Funes MD        acetaminophen (TYLENOL) tablet 650 mg  650 mg Oral Q4H PRN Jazmyn Gomez MD   650 mg at 12/04/20 2127    [Held by provider] heparin (porcine) injection 5,000 Units  5,000 Units SubCUTAneous Q8H Jazmyn Gomez MD   5,000 Units at 12/06/20 1411     Objective:     Patient Vitals for the past 24 hrs:   BP Temp Pulse Resp SpO2 Weight   12/07/20 0838 137/78 97.4 °F (36.3 °C) 90 16 94 %    12/07/20 0455 (!) 144/78 97.7 °F (36.5 °C) 80 16 94 % 70.1 kg (154 lb 8.7 oz)   12/07/20 0011 138/69 97.5 °F (36.4 °C) 71 16 91 %    12/06/20 2037 (!) 148/84 98.5 °F (36.9 °C) 87 16 95 %    12/06/20 2020   93 24 95 %    12/06/20 1522 (!) 154/72 97.9 °F (36.6 °C) 71 18 93 %    12/06/20 1225 (!) 146/75 97.6 °F (36.4 °C) 81 18 93 %        Gen: NAD  HEENT: PERRL, Sclerae anicteric  Cv: RRR without m/r/g  Pulm: aerating well bilat  Abd: NABS, NTND, No HSM  Ext: No c/c/e    Available labs reviewed:  Labs:    No results found for this or any previous visit (from the past 24 hour(s)). Assessment and Plan     71 y/o woman with MDS (MLD-RS, low risk) on outpatient procrit/luspatercept, now admitted with questionable UTI and confusion. 1. Confusion: seen by Neuro, MRI brain ordered and pending. CT normal. This appears to have resolved    2. MDS: no labs drawn this am, will order them daily. .. continue granix and procrit. Thomasena Dilling transfuse prn hgb <8      3. Bladder outlet obsruction:Dr. Trinidad Loza saw her and offered lithotripsy for non obstructing stones. . she is considering this    4. Abd. Pain/bloating. . this is a chronic concern of hers. .. I had offered w/u with CT in the office in October but she wanted [facility] urology to work this up. CT done this admit.  Shows non obstructing renal calculi,.,, no ascites or sign of malignancy    Brandon Patiño MD

## 2020-12-07 NOTE — PROGRESS NOTES
12/7/2020 -   TORSTEN:  - RUR: 10%  - Disposition is for discharge to [facility]  - Patient will need BLS transport  - Patient will need a negative COVID Test within 7 days of discharge  - Patient will need IM Letter prior to discharge    - Echo Pending  - ABX continue  - Lasix to be started today, 12/7  - PT and OT pending for today, 12/7    10:30 -   CM met with patient's spouse outside of patient's room to discuss rehab recommendation. Spouse is in agreement and selected [facility] to submit referral via [employer]. Transition of Care Plan:     The Plan for Transition of Care is related to the following treatment goals: IPR    The Patient and/or patient representative, spouse Rayna Adamson, was provided with a choice of provider and agrees  with the discharge plan. Yes [x] No []    A Freedom of choice list was provided with basic dialogue that supports the patient's individualized plan of care/goals and shares the quality data associated with the providers. Yes [x] No []  CRM: Kaitlynn Blair, MPH, [facility]; Z: [phone]    16:50 -   CM received call from [facility]) indicating that patient has been accepted for IPR placement. Patient needs a COVID test for placement.   CRM: Kaitlynn Blair, MPH, [address] [place] [address]; Z: [phone]

## 2020-12-07 NOTE — PROGRESS NOTES
Bedside and Verbal shift change report given to Perez Holman (oncoming nurse) by Gabby Almanzar RN (offgoing nurse). Report included the following information SBAR, Kardex, ED Summary, Procedure Summary, Intake/Output, MAR, Recent Results and Cardiac Rhythm NSR.

## 2020-12-07 NOTE — PROGRESS NOTES
Problem: Self Care Deficits Care Plan (Adult)  Goal: *Acute Goals and Plan of Care (Insert Text)  Description:   FUNCTIONAL STATUS PRIOR TO ADMISSION: Patient was modified independent using a rolling walker for functional mobility. HOME SUPPORT: The patient lived at home with her . Per report, patient has recently required much increased assist from  with all self-care and has been providing supervision/assist prn with mobility as well. Patient's spouse reports patient has declined significantly, had been much more independent - time period of decline unclear, requires further clarification. Occupational Therapy Goals  Initiated 12/3/2020  1. Patient will perform self-feeding with moderate assistance using most appropriate AE/set-up within 7 day(s). 2.  Patient will perform EOB grooming with supervision/set-up within 7 day(s). 3.  Patient will perform stand-pivot transfer to [facility] with moderate assistance x2 and RW within 7 days. 4.  Patient will perform all aspects of toileting with consistent maximal assistance within 7 day(s). 6.  Patient will participate in upper extremity therapeutic exercise/activities with moderate assistance for 5 minutes within 7 day(s). Outcome: Progressing Towards Goal   OCCUPATIONAL THERAPY TREATMENT  Patient: Juany Perez (56 y.o. female)  Date: 12/7/2020  Diagnosis: SOB (shortness of breath) [R06.02]  SOB (shortness of breath) [R06.02]   PNA (pneumonia)       Precautions: Fall, Skin  Chart, occupational therapy assessment, plan of care, and goals were reviewed. ASSESSMENT  Patient continues with skilled OT services and is progressing towards goals. Patient received in bed with  present.  Participation impacted by impaired seated balance, global weakness of extremities with edema, impaired active extension of bilateral wrists and fingers, impaired functional use of bilateral UEs, impaired reach to LEs, fear of falling (history of recent fall). Patient benefits by pacing. Patient states she has bilateral hand splints at home in [place].  states he will be able to get them on Friday.  instructed on and given practice on bilateral UE AAROM for bilateral shoulder and elbow flexion/extension and forearm supination/pronation. Instructed to complete 3 sets of 10 reps a day. Instructed patient and  on positioning bilateral UEs on 2 pillows with hands higher than elbows and wrists and fingers straight. Requested nursing to get patient a pancake bell to alert nursing with less difficulty. Current Level of Function Impacting Discharge (ADLs): washes face with min assist, total assist for LE dressing, toileting and self feeding    Other factors to consider for discharge: Highly motivated patient and          PLAN :  Patient continues to benefit from skilled intervention to address the above impairments. Continue treatment per established plan of care. to address goals. Recommend with staff: bed in modified chair position for meals and self care, encourage participation in all patient can do with assist, total assist for meals and self care overall at this time, position bilateral UEs on 2 pillows with hands higher than elbows and fingers straight to assist with edema and prevent soft tissue shortening of hands. Recommend next OT session: cylindrical foam for assisted self feeding, wrist splints or functional hand splints before  gets hers on Friday?     Recommendation for discharge: (in order for the patient to meet his/her long term goals)  Therapy 3 hours per day 5-7 days per week  If discharged to home, will need assist for all self care and mobility, mechanical lift for OOB, HH  This discharge recommendation:  Has been made in collaboration with the attending provider and/or case management    IF patient discharges home will need the following DME: bedside commode, hospital bed, mechanical lift, shower chair, and wheelchair       SUBJECTIVE:   Patient stated I'll try.     OBJECTIVE DATA SUMMARY:   Cognitive/Behavioral Status:  Neurologic State: Alert  Orientation Level: Oriented X4  Cognition: Appropriate for age attention/concentration; Follows commands  Perception: Appears intact  Perseveration: No perseveration noted  Safety/Judgement: Awareness of environment    Functional Mobility and Transfers for ADLs:  Bed Mobility:  Rolling: Moderate assistance;Assist x1;Additional time  Supine to Sit: Moderate assistance  Sit to Supine: Maximum assistance  Scooting: Maximum assistance;Assist x2         Balance:  Sitting: Impaired; Without support  Sitting - Static: Fair (occasional)  Sitting - Dynamic: Fair (occasional)         ADL Intervention:  Feeding  Feeding Assistance: Total assistance (dependent)    Grooming  Position Performed: Seated edge of bed  Washing Face: Minimum assistance  Brushing/Combing Hair: Total assistance (dependent)          Lower Body Dressing Assistance  Dressing Assistance: Total assistance(dependent)(inferred from mobility)    Toileting  Toileting Assistance: Total assistance(dependent)    Cognitive Retraining  Organizing/Sequencing: Breaking task down  Attention to Task: Single task  Following Commands:  Follows one step commands/directions  Safety/Judgement: Awareness of environment  Cues: Tactile cues provided;Verbal cues provided;Visual cues provided    Therapeutic Exercises: Bilateral UEs    EXERCISE   Sets   Reps   Active Active Assist   Passive   Comments   Shoulder Flexion 1 10 []               [x]               []                  Shoulder Abduction   []               []               []                  Shoulder external rotation   []               []               []                  Elbow flexion/extension 1 10 []               [x]               []                  Wrist extension 1 10 []               []               [x]                  Finger extension 1 10 []               [] [x]                        Activity Tolerance:   Fair    After treatment patient left in no apparent distress:   Heels elevated for pressure relief, Call bell within reach, Caregiver / family present, Side rails x 3, and seated upright in bed    COMMUNICATION/COLLABORATION:   The patients plan of care was discussed with: Physical therapist and Registered nurse.      DORIS Orlando  Time Calculation: 65 mins

## 2020-12-07 NOTE — PROGRESS NOTES
Problem: Falls - Risk of  Goal: *Absence of Falls  Description: Document Sergiojose alberto Win Fall Risk and appropriate interventions in the flowsheet. Outcome: Progressing Towards Goal  Note: Fall Risk Interventions:  Mobility Interventions: Communicate number of staff needed for ambulation/transfer, Strengthening exercises (ROM-active/passive), PT Consult for mobility concerns    Mentation Interventions: Adequate sleep, hydration, pain control, Door open when patient unattended, Family/sitter at bedside, More frequent rounding    Medication Interventions: Evaluate medications/consider consulting pharmacy    Elimination Interventions: Call light in reach    History of Falls Interventions: Door open when patient unattended, Evaluate medications/consider consulting pharmacy, Investigate reason for fall         Problem: Patient Education: Go to Patient Education Activity  Goal: Patient/Family Education  Outcome: Progressing Towards Goal     Problem: Pressure Injury - Risk of  Goal: *Prevention of pressure injury  Description: Document Neeraj Scale and appropriate interventions in the flowsheet.   Outcome: Progressing Towards Goal  Note: Pressure Injury Interventions:  Sensory Interventions: Assess need for specialty bed, Keep linens dry and wrinkle-free, Maintain/enhance activity level, Minimize linen layers    Moisture Interventions: Apply protective barrier, creams and emollients, Absorbent underpads, Minimize layers, Moisture barrier    Activity Interventions: Increase time out of bed    Mobility Interventions: HOB 30 degrees or less, Float heels    Nutrition Interventions: Document food/fluid/supplement intake    Friction and Shear Interventions: Apply protective barrier, creams and emollients, HOB 30 degrees or less                Problem: Patient Education: Go to Patient Education Activity  Goal: Patient/Family Education  Outcome: Progressing Towards Goal     Problem: Risk for Spread of Infection  Goal: Prevent transmission of infectious organism to others  Description: Prevent the transmission of infectious organisms to other patients, staff members, and visitors.   Outcome: Progressing Towards Goal     Problem: Patient Education:  Go to Education Activity  Goal: Patient/Family Education  Outcome: Progressing Towards Goal     Problem: Pain  Goal: *Control of Pain  Outcome: Progressing Towards Goal  Goal: *PALLIATIVE CARE:  Alleviation of Pain  Outcome: Progressing Towards Goal     Problem: Patient Education: Go to Patient Education Activity  Goal: Patient/Family Education  Outcome: Progressing Towards Goal     Problem: Patient Education: Go to Patient Education Activity  Goal: Patient/Family Education  Outcome: Progressing Towards Goal     Problem: Patient Education: Go to Patient Education Activity  Goal: Patient/Family Education  Outcome: Progressing Towards Goal

## 2020-12-07 NOTE — PROGRESS NOTES
Hospitalist Progress Note    NAME: Soniya Khan   :  1945   MRN:  [id number]       Assessment / Plan:  LLL PNA: neutropenic fever, bone marrow dysfunction that was attributed to Bactrim. -Continue telemetry monitor  -IVF's, continue IV fluid, low urine output today  -SARS-CoV-2: negative  -oxygen support, pulse ox monitoring  -pro-Vlad level, 0.11, on 2020  -CT chest: Bilateral pleural effusions with overlying atelectasis. Coronary artery disease. Nonobstructed bilateral nephrolithiasis. Moderate rectal fecal retention. -ID following, appreciated recommendation  Continue cefepime. Discussed with ID, plan for p.o. antibiotic for 7 days.     Acute metabolic encephalopathy: Improved, due to above. -CT head: Indeterminate hyperdensity just above the sella turcica posteriorly. Contrast-enhanced MRI of the brain for further delineation recommended, findings consistent with mild chronic microvascular ischemic change, moderate to severe temporal predominant degree of cerebral atrophy.  -Neuro following: MRI C spine, Brain  Appreciated infectious disease recommendation. Continues Keflex. -IVF's  -neurovascular checks  -MRI brain: pending  -MRI C spine: r/o cord compression-pending  -TSH: 1.64  -B12: 2,000.   -fall precautions  -aspiration precautions, symptoms persist may consider further intervention and diagnostics,  final urine culture pending  -BCNGTD, MRSA- negative     Neutropenic Fever:  No fever last 24 hours  -monitor CBC  -Heme-onc consult:  Appreciated recommendation     Myelodysplastic syndrome: Appreciated recommendation,  Anemia: Chronic vs Acute, no obvious signs of bleeding.  -monitor CBC  -Heme-onc consult     Chronic Leukopenia/ Neutropenia: following adverse reaction to sulfa drug. Heme/Onc.   Chronic UTI's, Hx Nephrolithiasis and Bladder Obstruction:  Urology following  Acute Hypoxic Respiratory Failure: Resolved, on room oxygen  -ABG stable  -plan as above  Generalized swelling: Conjunctival edema, bilateral lower extremity edema, upper extremity edema. Patient has normal renal function. Ordered echo, start Lasix daily. Continue daily weight intake output. POA Left lower leg, abrasion from fall: 5 x 3 x 0.1 cm, 100% pink, no exudate, no odor. Periwound intact from erythema.  -daily cleanse with saline; apply Xeroform and dry dressing.  -Sacrum, buttocks and heels: Venelex TID. 12/5/2020: Shortness of breath: On oxygen by nasal cannula 2 L. Maintains good saturations. Chest x-ray. Incentive spirometry. Lung sounds clear at this time. 12/6/2020: More alert today. Reports feeling better. 's report that she was more energetic last night. Denies shortness of breath at rest.  Her lungs clear to auscultation does not have inspiratory crackles. Will continue monitoring with stop IV fluid  DVTppx: Heparin  Code Status: Full Code  Diet: Cardiac  Activity: OOB to chair TID and PRN  Discharge: Inpatient rehab. Subjective:     Chief Complaint / Reason for Physician Visit  \"Extremely tired, woke up 4 AM for blood work. .\". Discussed with RN events overnight. Review of Systems:  Symptom Y/N Comments  Symptom Y/N Comments   Fever/Chills    Chest Pain     Poor Appetite    Edema     Cough    Abdominal Pain     Sputum    Joint Pain     SOB/HALL    Pruritis/Rash     Nausea/vomit    Tolerating PT/OT     Diarrhea    Tolerating Diet     Constipation    Other       Could NOT obtain due to:      Objective:     VITALS:   Last 24hrs VS reviewed since prior progress note.  Most recent are:  Patient Vitals for the past 24 hrs:   Temp Pulse Resp BP SpO2   12/07/20 0838 97.4 °F (36.3 °C) 90 16 137/78 94 %   12/07/20 0455 97.7 °F (36.5 °C) 80 16 (!) 144/78 94 %   12/07/20 0011 97.5 °F (36.4 °C) 71 16 138/69 91 %   12/06/20 2037 98.5 °F (36.9 °C) 87 16 (!) 148/84 95 %   12/06/20 2020  93 24  95 %   12/06/20 1522 97.9 °F (36.6 °C) 71 18 (!) 154/72 93 %   12/06/20 1225 97.6 °F (36.4 °C) 81 18 (!) 146/75 93 %       Intake/Output Summary (Last 24 hours) at 12/7/2020 0959  Last data filed at 12/6/2020 1410  Gross per 24 hour   Intake 300 ml   Output 400 ml   Net -100 ml        I had a face to face encounter and independently examined this patient on 12/7/2020, as outlined below:  PHYSICAL EXAM:  General: Ill looking patient, frail. Alert, cooperative, no acute distress    EENT:  EOMI. Anicteric sclerae. MMM. Conjunctival edema  Resp:  CTA bilaterally, no wheezing or rales. No accessory muscle use  CV:  Regular  rhythm,  lower extremity edema. GI:  Soft, Non distended, Non tender. +Bowel sounds  Neurologic:  Alert and oriented X 3, normal speech,   Psych:   Good insight. Not anxious nor agitated  Skin:  No rashes. No jaundice    Reviewed most current lab test results and cultures  YES  Reviewed most current radiology test results   YES  Review and summation of old records today    NO  Reviewed patient's current orders and MAR    YES  PMH/ reviewed - no change compared to H&P  ________________________________________________________________________  Care Plan discussed with:    Comments   Patient     Family      RN     Care Manager     Consultant                        Multidiciplinary team rounds were held today with , nursing, pharmacist and clinical coordinator. Patient's plan of care was discussed; medications were reviewed and discharge planning was addressed. ________________________________________________________________________  Total NON critical care TIME: 40 minutes    Total CRITICAL CARE TIME Spent:   Minutes non procedure based      Comments   >50% of visit spent in counseling and coordination of care     ________________________________________________________________________  Essence Dorantes MD     Procedures: see electronic medical records for all procedures/Xrays and details which were not copied into this note but were reviewed prior to creation of Plan.       LABS:  I reviewed today's most current labs and imaging studies.   Pertinent labs include:  Recent Labs     12/05/20 0259   WBC 4.6   HGB 7.8*   HCT 25.8*        Recent Labs     12/05/20 0259   *   K 4.0   *   CO2 22   GLU 89   BUN 28*   CREA 0.55   CA 8.5   ALB 1.8*   TBILI 0.3   ALT 11*       Signed: Atul Anne MD

## 2020-12-07 NOTE — PROGRESS NOTES
Problem: Mobility Impaired (Adult and Pediatric)  Goal: *Acute Goals and Plan of Care (Insert Text)  Description: FUNCTIONAL STATUS PRIOR TO ADMISSION: Patient was modified independent using a rolling walker for functional mobility. HOME SUPPORT PRIOR TO ADMISSION: The patient lived with  but did not require assist.    Physical Therapy Goals  Initiated 12/2/2020  1. Patient will move from supine to sit and sit to supine  in bed with supervision/set-up within 7 day(s). 2.  Patient will transfer from bed to chair and chair to bed with minimal assistance/contact guard assist using the least restrictive device within 7 day(s). 3.  Patient will perform sit to stand with minimal assistance/contact guard assist within 7 day(s). 4.  Patient will ambulate with minimal assistance/contact guard assist for 10 feet with the least restrictive device within 7 day(s). Outcome: Progressing Towards Goal   PHYSICAL THERAPY TREATMENT  Patient: Audi Ernst (77 y.o. female)  Date: 12/7/2020  Diagnosis: SOB (shortness of breath) [R06.02]  SOB (shortness of breath) [R06.02]   PNA (pneumonia)       Precautions: Fall, Skin  Chart, physical therapy assessment, plan of care and goals were reviewed. ASSESSMENT  Patient continues with skilled PT services and is progressing towards goals. Patient continue to be limited by global weakness, today global edema (more edematous than when last seen for PT which was on Friday), impaired sitting balance, and is now on supplemental 02 at 3 liters and her 02 sats were stable ranging from 92-96% during session. Worked with her on LE ex in supine and in sitting, see below in body of this note. Also worked on forward lean in sitting to off load her buttocks as it relates to scooting along the EOB as precursor to sit to stand and transfer options of sliding board/lateral transfers. She required assist X 2 to be able to initiate any clearance of her buttocks.  She tried all that was asked of her and fully participated in session. Continue to recommend rehab. She will greatly benefit from rehab with equipment designed to assist with sit <>stand and for support while in standing. Highly at risk for falls and remains far from her baseline. For OOB I recommend use of jona lift to recliner chair and positioned in reclined position with LEs elevated. .     Current Level of Function Impacting Discharge (mobility/balance): up to max assist X 2 with impaired sitting balance, not ready for sit to stand. Other factors to consider for discharge: Stephane Cuellar Medically complex: Myelodysplastic syndrome with chronic leukopenia/neutropenia, high fall risk, 7 steps to access her home, now on supplemental 02 and does not use supplemental 02 at baseline. PLAN :  Patient continues to benefit from skilled intervention to address the above impairments. Continue treatment per established plan of care. to address goals. Recommendation for discharge: (in order for the patient to meet his/her long term goals)  Therapy 3 hours per day 5-7 days per week    This discharge recommendation:  A follow-up discussion with the attending provider and/or case management is planned    IF patient discharges home will need the following DME: hospital bed, mechanical lift and wheelchair if she were to discharge to home in present mobility state       SUBJECTIVE:   Patient stated That's better, referring to the rectal pain she was having last week    OBJECTIVE DATA SUMMARY:   Chart checked, pt cleared by nursing  Critical Behavior:  Neurologic State: Alert  Orientation Level: Oriented X4  Cognition: Appropriate for age attention/concentration, Follows commands  Safety/Judgement: Awareness of environment  Functional Mobility Training:  Bed Mobility:  Rolling:  Moderate assistance;Assist x1;Additional time  Supine to Sit: Moderate assistance  Sit to Supine: Maximum assistance  Scooting: Maximum assistance;Assist x2 Transfers:               Pt unable, would be total assist                    Balance:  Sitting: Impaired; Without support  Sitting - Static: Fair (occasional)  Sitting - Dynamic: Fair (occasional)  Ambulation/Gait Training:                                                        Stairs: Therapeutic Exercises:   Bilateral: Supine AAROM dorsiflex and plantarflexion, AA hip flex and resisted hip extension from flexed position, assisted bridging, and LAQs while sitting at the EOB and assisted controlled descent from LAQs. Pain Rating:  None rated    Activity Tolerance:   Good and requires rest breaks    After treatment patient left in no apparent distress:   Supine in bed, Heels elevated for pressure relief, Call bell within reach, Caregiver / family present, and working with GEORGE    COMMUNICATION/COLLABORATION:   The patients plan of care was discussed with: Occupational therapy assistant and Registered nurse.      Flor Lopez   Time Calculation: 53 mins

## 2020-12-08 ENCOUNTER — APPOINTMENT (OUTPATIENT)
Dept: CT IMAGING | Age: 75
DRG: 689 | End: 2020-12-08
Attending: FAMILY MEDICINE
Payer: MEDICARE

## 2020-12-08 LAB
ANION GAP SERPL CALC-SCNC: 6 MMOL/L (ref 5–15)
BASOPHILS # BLD: 0 K/UL (ref 0–0.1)
BASOPHILS NFR BLD: 0 % (ref 0–1)
BUN SERPL-MCNC: 28 MG/DL (ref 6–20)
BUN/CREAT SERPL: 53 (ref 12–20)
CALCIUM SERPL-MCNC: 8.7 MG/DL (ref 8.5–10.1)
CHLORIDE SERPL-SCNC: 117 MMOL/L (ref 97–108)
CO2 SERPL-SCNC: 23 MMOL/L (ref 21–32)
CREAT SERPL-MCNC: 0.53 MG/DL (ref 0.55–1.02)
DIFFERENTIAL METHOD BLD: ABNORMAL
EOSINOPHIL # BLD: 0.3 K/UL (ref 0–0.4)
EOSINOPHIL NFR BLD: 2 % (ref 0–7)
ERYTHROCYTE [DISTWIDTH] IN BLOOD BY AUTOMATED COUNT: 22.1 % (ref 11.5–14.5)
GLUCOSE SERPL-MCNC: 103 MG/DL (ref 65–100)
HCT VFR BLD AUTO: 25.5 % (ref 35–47)
HGB BLD-MCNC: 8.3 G/DL (ref 11.5–16)
IMM GRANULOCYTES # BLD AUTO: 0 K/UL
IMM GRANULOCYTES NFR BLD AUTO: 0 %
LYMPHOCYTES # BLD: 0.9 K/UL (ref 0.8–3.5)
LYMPHOCYTES NFR BLD: 6 % (ref 12–49)
MCH RBC QN AUTO: 35 PG (ref 26–34)
MCHC RBC AUTO-ENTMCNC: 32.5 G/DL (ref 30–36.5)
MCV RBC AUTO: 107.6 FL (ref 80–99)
MONOCYTES # BLD: 2.5 K/UL (ref 0–1)
MONOCYTES NFR BLD: 16 % (ref 5–13)
NEUTS BAND NFR BLD MANUAL: 11 % (ref 0–6)
NEUTS SEG # BLD: 12.1 K/UL (ref 1.8–8)
NEUTS SEG NFR BLD: 65 % (ref 32–75)
NRBC # BLD: 0.07 K/UL (ref 0–0.01)
NRBC BLD-RTO: 0.4 PER 100 WBC
PLATELET # BLD AUTO: 260 K/UL (ref 150–400)
PMV BLD AUTO: 10.9 FL (ref 8.9–12.9)
POTASSIUM SERPL-SCNC: 3.4 MMOL/L (ref 3.5–5.1)
RBC # BLD AUTO: 2.37 M/UL (ref 3.8–5.2)
RBC MORPH BLD: ABNORMAL
SODIUM SERPL-SCNC: 146 MMOL/L (ref 136–145)
WBC # BLD AUTO: 15.8 K/UL (ref 3.6–11)

## 2020-12-08 PROCEDURE — 36600 WITHDRAWAL OF ARTERIAL BLOOD: CPT

## 2020-12-08 PROCEDURE — 74011250637 HC RX REV CODE- 250/637: Performed by: NURSE PRACTITIONER

## 2020-12-08 PROCEDURE — 84478 ASSAY OF TRIGLYCERIDES: CPT

## 2020-12-08 PROCEDURE — 89050 BODY FLUID CELL COUNT: CPT

## 2020-12-08 PROCEDURE — 87015 SPECIMEN INFECT AGNT CONCNTJ: CPT

## 2020-12-08 PROCEDURE — 82150 ASSAY OF AMYLASE: CPT

## 2020-12-08 PROCEDURE — 84157 ASSAY OF PROTEIN OTHER: CPT

## 2020-12-08 PROCEDURE — 87205 SMEAR GRAM STAIN: CPT

## 2020-12-08 PROCEDURE — 83986 ASSAY PH BODY FLUID NOS: CPT

## 2020-12-08 PROCEDURE — 85025 COMPLETE CBC W/AUTO DIFF WBC: CPT

## 2020-12-08 PROCEDURE — 77030038269 HC DRN EXT URIN PURWCK BARD -A

## 2020-12-08 PROCEDURE — 36415 COLL VENOUS BLD VENIPUNCTURE: CPT

## 2020-12-08 PROCEDURE — 83615 LACTATE (LD) (LDH) ENZYME: CPT

## 2020-12-08 PROCEDURE — 74011250637 HC RX REV CODE- 250/637: Performed by: FAMILY MEDICINE

## 2020-12-08 PROCEDURE — 65660000000 HC RM CCU STEPDOWN

## 2020-12-08 PROCEDURE — 74011250636 HC RX REV CODE- 250/636: Performed by: FAMILY MEDICINE

## 2020-12-08 PROCEDURE — 82945 GLUCOSE OTHER FLUID: CPT

## 2020-12-08 PROCEDURE — 74011250637 HC RX REV CODE- 250/637: Performed by: INTERNAL MEDICINE

## 2020-12-08 PROCEDURE — 80048 BASIC METABOLIC PNL TOTAL CA: CPT

## 2020-12-08 PROCEDURE — 71250 CT THORAX DX C-: CPT

## 2020-12-08 PROCEDURE — P9047 ALBUMIN (HUMAN), 25%, 50ML: HCPCS | Performed by: FAMILY MEDICINE

## 2020-12-08 RX ORDER — ALBUMIN HUMAN 250 G/1000ML
12.5 SOLUTION INTRAVENOUS EVERY 6 HOURS
Status: DISCONTINUED | OUTPATIENT
Start: 2020-12-08 | End: 2020-12-11 | Stop reason: HOSPADM

## 2020-12-08 RX ADMIN — CEPHALEXIN 500 MG: 250 CAPSULE ORAL at 21:02

## 2020-12-08 RX ADMIN — FAMOTIDINE 20 MG: 20 TABLET, FILM COATED ORAL at 18:45

## 2020-12-08 RX ADMIN — LACTULOSE 15 ML: 20 SOLUTION ORAL at 11:05

## 2020-12-08 RX ADMIN — DOCUSATE SODIUM 100 MG: 100 CAPSULE, LIQUID FILLED ORAL at 11:05

## 2020-12-08 RX ADMIN — ALBUMIN (HUMAN) 12.5 G: 0.25 INJECTION, SOLUTION INTRAVENOUS at 13:07

## 2020-12-08 RX ADMIN — PREGABALIN 150 MG: 75 CAPSULE ORAL at 20:58

## 2020-12-08 RX ADMIN — Medication 10 ML: at 21:02

## 2020-12-08 RX ADMIN — ALBUMIN (HUMAN) 12.5 G: 0.25 INJECTION, SOLUTION INTRAVENOUS at 20:58

## 2020-12-08 RX ADMIN — Medication 10 ML: at 07:19

## 2020-12-08 RX ADMIN — PREGABALIN 150 MG: 75 CAPSULE ORAL at 11:04

## 2020-12-08 RX ADMIN — Medication: at 15:44

## 2020-12-08 RX ADMIN — FUROSEMIDE 20 MG: 20 TABLET ORAL at 07:19

## 2020-12-08 RX ADMIN — CEPHALEXIN 500 MG: 250 CAPSULE ORAL at 13:09

## 2020-12-08 RX ADMIN — Medication: at 21:02

## 2020-12-08 RX ADMIN — FUROSEMIDE 20 MG: 20 TABLET ORAL at 15:45

## 2020-12-08 RX ADMIN — DOCUSATE SODIUM 100 MG: 100 CAPSULE, LIQUID FILLED ORAL at 18:45

## 2020-12-08 RX ADMIN — FAMOTIDINE 20 MG: 20 TABLET, FILM COATED ORAL at 11:05

## 2020-12-08 RX ADMIN — CEPHALEXIN 500 MG: 250 CAPSULE ORAL at 07:19

## 2020-12-08 RX ADMIN — Medication: at 11:04

## 2020-12-08 RX ADMIN — IBUPROFEN 400 MG: 400 TABLET ORAL at 16:14

## 2020-12-08 RX ADMIN — PHENAZOPYRIDINE HYDROCHLORIDE 100 MG: 100 TABLET ORAL at 22:02

## 2020-12-08 RX ADMIN — IBUPROFEN 400 MG: 400 TABLET ORAL at 21:02

## 2020-12-08 NOTE — PROGRESS NOTES
Hospitalist Progress Note    NAME: Rosa Strickland   :  1945   MRN:  [id number]       Assessment / Plan:  LLL PNA: neutropenic fever, bone marrow dysfunction that was attributed to Bactrim. -Continue telemetry monitor  -IVF's, continue IV fluid, low urine output today  -SARS-CoV-2: negative  -oxygen support, pulse ox monitoring  -pro-Vlad level, 0.11, on 2020  -CT chest: Bilateral pleural effusions with overlying atelectasis. Coronary artery disease. Nonobstructed bilateral nephrolithiasis. Moderate rectal fecal retention. -ID following, appreciated recommendation  Continue cefepime. Discussed with ID, plan for p.o. antibiotic for 7 days.     Acute metabolic encephalopathy: Improved, due to above. -CT head: Indeterminate hyperdensity just above the sella turcica posteriorly. Contrast-enhanced MRI of the brain for further delineation recommended, findings consistent with mild chronic microvascular ischemic change, moderate to severe temporal predominant degree of cerebral atrophy.  -Neuro following: MRI C spine, Brain  Appreciated infectious disease recommendation. Continues Keflex. -IVF's  -neurovascular checks  -MRI brain: pending  -MRI C spine: r/o cord compression-pending  -TSH: 1.64  -B12: 2,000.   -fall precautions  -aspiration precautions, symptoms persist may consider further intervention and diagnostics,  final urine culture pending  -BCNGTD, MRSA- negative     Neutropenic Fever:  No fever last 24 hours  -monitor CBC  -Heme-onc consult:  Appreciated recommendation     Myelodysplastic syndrome: Appreciated recommendation,  Anemia: Chronic vs Acute, no obvious signs of bleeding.  -monitor CBC  -Heme-onc consult     Chronic Leukopenia/ Neutropenia: following adverse reaction to sulfa drug. Heme/Onc.   Chronic UTI's, Hx Nephrolithiasis and Bladder Obstruction:  Urology following  Acute Hypoxic Respiratory Failure: Resolved, on room oxygen  -ABG stable  -plan as above  Generalized swelling: Conjunctival edema, bilateral lower extremity edema, upper extremity edema. Patient has normal renal function. Ordered echo, start Lasix daily. Continue daily weight intake output. POA Left lower leg, abrasion from fall: 5 x 3 x 0.1 cm, 100% pink, no exudate, no odor. Periwound intact from erythema.  -daily cleanse with saline; apply Xeroform and dry dressing.  -Sacrum, buttocks and heels: Venelex TID. 12/5/2020: Shortness of breath: On oxygen by nasal cannula 2 L. Maintains good saturations. Chest x-ray. Incentive spirometry. Lung sounds clear at this time. 12/6/2020: More alert today. Reports feeling better. 's report that she was more energetic last night. Denies shortness of breath at rest.  Her lungs clear to auscultation does not have inspiratory crackles. Will continue monitoring with stop IV fluid  12/8/2020: Generalized swelling, anasarca: CT of chest shows pleural effusion, will send patient for paracentesis, albumin, continue Lasix, good urine output, feeling better, discussed with oncologist.  Therese Kiser send pleurocentesis fluid for culture and cell count. DVTppx: Heparin  Code Status: Full Code  Diet: Cardiac  Activity: OOB to chair TID and PRN  Discharge: Inpatient rehab. Subjective:     Chief Complaint / Reason for Physician Visit  \"Feeling okay, denies shortness of breath at rest.  Tries to do some physical therapy today. Had better appetite today. Aster Simpler \". Discussed with RN events overnight. Review of Systems:  Symptom Y/N Comments  Symptom Y/N Comments   Fever/Chills    Chest Pain     Poor Appetite    Edema     Cough    Abdominal Pain     Sputum    Joint Pain     SOB/HALL    Pruritis/Rash     Nausea/vomit    Tolerating PT/OT     Diarrhea    Tolerating Diet     Constipation    Other       Could NOT obtain due to:      Objective:     VITALS:   Last 24hrs VS reviewed since prior progress note.  Most recent are:  Patient Vitals for the past 24 hrs:   Temp Pulse Resp BP SpO2 12/08/20 1025 97.8 °F (36.6 °C) 79 16 132/66 94 %   12/08/20 0859 97.7 °F (36.5 °C) 90 16 128/68 92 %   12/08/20 0508 97.8 °F (36.6 °C) 88 16 131/68 94 %   12/07/20 2252 98.7 °F (37.1 °C) 83 16 125/61 95 %   12/07/20 1900 97.7 °F (36.5 °C) 81 18 138/67 96 %   12/07/20 1628 97.7 °F (36.5 °C) 81 18 127/67 96 %   12/07/20 1559    135/66        Intake/Output Summary (Last 24 hours) at 12/8/2020 1414  Last data filed at 12/7/2020 1543  Gross per 24 hour   Intake 100 ml   Output 600 ml   Net -500 ml        I had a face to face encounter and independently examined this patient on 12/8/2020, as outlined below:  PHYSICAL EXAM:  General: Ill looking patient, frail. Alert, cooperative, no acute distress    EENT:  EOMI. Anicteric sclerae. MMM. Conjunctival edema  Resp:  CTA bilaterally, no wheezing or rales. No accessory muscle use  CV:  Regular  rhythm,  lower extremity edema. GI:  Soft, Non distended, Non tender. +Bowel sounds  Neurologic:  Alert and oriented X 3, normal speech,   Psych:   Good insight. Not anxious nor agitated  Skin:  No rashes. No jaundice    Reviewed most current lab test results and cultures  YES  Reviewed most current radiology test results   YES  Review and summation of old records today    NO  Reviewed patient's current orders and MAR    YES  PMH/ reviewed - no change compared to H&P  ________________________________________________________________________  Care Plan discussed with:    Comments   Patient     Family      RN     Care Manager     Consultant                        Multidiciplinary team rounds were held today with , nursing, pharmacist and clinical coordinator. Patient's plan of care was discussed; medications were reviewed and discharge planning was addressed.      ________________________________________________________________________  Total NON critical care TIME: 40 minutes    Total CRITICAL CARE TIME Spent:   Minutes non procedure based      Comments   >50% of visit spent in counseling and coordination of care     ________________________________________________________________________  Ld Yang MD     Procedures: see electronic medical records for all procedures/Xrays and details which were not copied into this note but were reviewed prior to creation of Plan. LABS:  I reviewed today's most current labs and imaging studies.   Pertinent labs include:  Recent Labs     12/08/20 0512   WBC 15.8*   HGB 8.3*   HCT 25.5*        Recent Labs     12/08/20 0512   *   K 3.4*   *   CO2 23   *   BUN 28*   CREA 0.53*   CA 8.7       Signed: Ld Yang MD

## 2020-12-08 NOTE — PROGRESS NOTES
Occupational Therapy    Chart reviewed, pt cleared for therapy by nursing. Pt politely declined despite encouragement from OT, , nursing to participate. Reported feeling SOB, (SpO2 96% on 3L) and exhausted, requested to defer to later today vs. Tomorrow. Provided education on importance of OOB activity and bed exercises to optimize functional recovery. Pt verbalized understanding. Supportive spouse reported he will assist with bed exercises. Will continue to follow. Thank you for the opportunity to participate in this pts plan of care.     Tarah Cabrera OTR/L

## 2020-12-08 NOTE — PROGRESS NOTES
12/8/2020 -   TORSTEN:  - RUR: 10%  - Disposition is for discharge to [facility]  - Patient will need BLS transport  - Patient will need a negative COVID Test within 7 days of discharge; attending aware  - Patient will need IM Letter prior to discharge    - Labs are being monitored  - Echo is pending  - Huang Campuzano continues  CRM: Karissa Vega, MPH, [facility]; Z: [phone]    09:20 -   CM contacted [facility] Bonita Carton: [phone]) and left a VM indicating that patient is anticipated to be ready for discharge tomorrow, 12/9. Medicare pt has received, reviewed, and signed 2nd IM letter informing them of their right to appeal the discharge. Signed copy has been placed on pt bedside chart.   CRM: Karissa Vega, MPH, [address] [place] [address]; Z: [phone]

## 2020-12-08 NOTE — PROGRESS NOTES
Hematology-Oncology Progress Note      Alfonso Hernández  1945  [id number]  12/8/2020      Subjective:     Pt off floor, d/w  , he states that she is stronger, has better appetite      Allergies: Fentanyl; Celebrex [celecoxib];  Duloxetine; and Sulfa (sulfonamide antibiotics)  Current Facility-Administered Medications   Medication Dose Route Frequency Provider Last Rate Last Dose    furosemide (LASIX) tablet 20 mg  20 mg Oral ACB&D Tc Barney MD   20 mg at 12/08/20 0719    cephALEXin (KEFLEX) capsule 500 mg  500 mg Oral Q8H Molly Heck DO   500 mg at 12/08/20 0719    famotidine (PEPCID) tablet 20 mg  20 mg Oral BID Whitney Magdaleno NP   20 mg at 12/08/20 1105    tbo-filgrastim (GRANIX) injection 300 mcg  300 mcg SubCUTAneous QPM Alessandra Lemon MD   300 mcg at 12/07/20 1847    lactulose (CHRONULAC) 10 gram/15 mL solution 15 mL  10 g Oral BID Alessandra Lemon MD   15 mL at 12/08/20 1105    pregabalin (LYRICA) capsule 150 mg  150 mg Oral BID Tc Barney MD   150 mg at 12/08/20 1104    ondansetron (ZOFRAN) injection 4 mg  4 mg IntraVENous Q4H PRN Clyde Herron NP   4 mg at 12/07/20 1358    ibuprofen (MOTRIN) tablet 400 mg  400 mg Oral Q4H PRN Clyde Herron NP   400 mg at 12/07/20 2137    phenazopyridine (PYRIDIUM) tablet 100 mg  100 mg Oral TID PRN Lenox Fleischer, NP   100 mg at 12/03/20 9018    balsam peru-castor oiL (VENELEX) ointment   Topical TID Rodney Garay MD        epoetin annie-epbx (RETACRIT) injection 20,000 Units  20,000 Units SubCUTAneous Q7D Brissa Davenport MD   20,000 Units at 12/03/20 2105    oxyCODONE IR (ROXICODONE) tablet 5 mg  5 mg Oral Q4H PRN Clyde Herron NP   5 mg at 12/01/20 2145    docusate sodium (COLACE) capsule 100 mg  100 mg Oral BID Lashae Hernandez MD   100 mg at 12/08/20 1105    sodium chloride (NS) flush 5-40 mL  5-40 mL IntraVENous Q8H Lashae Hernandez MD   10 mL at 12/08/20 0719    sodium chloride (NS) flush 5-40 mL  5-40 mL IntraVENous PRN Sudha Babb MD        acetaminophen (TYLENOL) tablet 650 mg  650 mg Oral Q4H PRN Sudha Babb MD   650 mg at 12/04/20 2127    [Held by provider] heparin (porcine) injection 5,000 Units  5,000 Units SubCUTAneous Q8H Sudha Babb MD   5,000 Units at 12/06/20 1411     Objective:     Patient Vitals for the past 24 hrs:   BP Temp Pulse Resp SpO2 Height Weight   12/08/20 1025 132/66 97.8 °F (36.6 °C) 79 16 94 %     12/08/20 0859 128/68 97.7 °F (36.5 °C) 90 16 92 %     12/08/20 0508 131/68 97.8 °F (36.6 °C) 88 16 94 %     12/07/20 2252 125/61 98.7 °F (37.1 °C) 83 16 95 %     12/07/20 1900 138/67 97.7 °F (36.5 °C) 81 18 96 %     12/07/20 1628 127/67 97.7 °F (36.5 °C) 81 18 96 %     12/07/20 1559 135/66     5' 4\" (1.626 m) 70 kg (154 lb 5.2 oz)   12/07/20 1237 135/66 97.1 °F (36.2 °C) 66 16 96 %         Gen: NAD  HEENT: PERRL, Sclerae anicteric  Cv: RRR without m/r/g  Pulm: aerating well bilat  Abd: NABS, NTND, No HSM  Ext: No c/c/e    Available labs reviewed:  Labs:    Recent Results (from the past 24 hour(s))   ECHO ADULT COMPLETE    Collection Time: 12/07/20  4:00 PM   Result Value Ref Range    IVSd 1.05 (A) 0.6 - 0.9 cm    LVIDd 3.39 (A) 3.9 - 5.3 cm    LVIDs 2.03 cm    LVPWd 0.91 (A) 0.6 - 0.9 cm    LVOT Peak Gradient 4.68 mmHg    LVOT Peak Velocity 108.14 cm/s    AoV PG 4.95 mmHg    Aortic Valve Systolic Peak Velocity 886.33 cm/s    MV A Khanh 89.60 cm/s    Mitral Valve E Wave Deceleration Time 112.48 ms    MV E Khanh 48.10 cm/s    Mitral Valve Pressure Half-time 32.62 ms    MVA (PHT) 6.74 cm2    Pulmonic Valve Systolic Peak Instantaneous Gradient 4.92 mmHg    Tapse 1.87 1.5 - 2.0 cm    MV E/A 0.54     LV Mass AL 95.6 67 - 162 g    LV Mass AL Index 54.6 43 - 95 g/m2   METABOLIC PANEL, BASIC    Collection Time: 12/08/20  5:12 AM   Result Value Ref Range    Sodium 146 (H) 136 - 145 mmol/L    Potassium 3.4 (L) 3.5 - 5.1 mmol/L    Chloride 117 (H) 97 - 108 mmol/L    CO2 23 21 - 32 mmol/L    Anion gap 6 5 - 15 mmol/L    Glucose 103 (H) 65 - 100 mg/dL    BUN 28 (H) 6 - 20 MG/DL    Creatinine 0.53 (L) 0.55 - 1.02 MG/DL    BUN/Creatinine ratio 53 (H) 12 - 20      GFR est AA >60 >60 ml/min/1.73m2    GFR est non-AA >60 >60 ml/min/1.73m2    Calcium 8.7 8.5 - 10.1 MG/DL   CBC WITH AUTOMATED DIFF    Collection Time: 12/08/20  5:12 AM   Result Value Ref Range    WBC 15.8 (H) 3.6 - 11.0 K/uL    RBC 2.37 (L) 3.80 - 5.20 M/uL    HGB 8.3 (L) 11.5 - 16.0 g/dL    HCT 25.5 (L) 35.0 - 47.0 %    .6 (H) 80.0 - 99.0 FL    MCH 35.0 (H) 26.0 - 34.0 PG    MCHC 32.5 30.0 - 36.5 g/dL    RDW 22.1 (H) 11.5 - 14.5 %    PLATELET 716 615 - 705 K/uL    MPV 10.9 8.9 - 12.9 FL    NRBC 0.4 (H) 0  WBC    ABSOLUTE NRBC 0.07 (H) 0.00 - 0.01 K/uL    NEUTROPHILS 65 32 - 75 %    BAND NEUTROPHILS 11 (H) 0 - 6 %    LYMPHOCYTES 6 (L) 12 - 49 %    MONOCYTES 16 (H) 5 - 13 %    EOSINOPHILS 2 0 - 7 %    BASOPHILS 0 0 - 1 %    IMMATURE GRANULOCYTES 0 %    ABS. NEUTROPHILS 12.1 (H) 1.8 - 8.0 K/UL    ABS. LYMPHOCYTES 0.9 0.8 - 3.5 K/UL    ABS. MONOCYTES 2.5 (H) 0.0 - 1.0 K/UL    ABS. EOSINOPHILS 0.3 0.0 - 0.4 K/UL    ABS. BASOPHILS 0.0 0.0 - 0.1 K/UL    ABS. IMM. GRANS. 0.0 K/UL    DF MANUAL      RBC COMMENTS ANISOCYTOSIS  2+        RBC COMMENTS MACROCYTOSIS  1+        RBC COMMENTS OVALOCYTES  1+             Assessment and Plan     69 y/o woman with MDS (MLD-RS, low risk) on outpatient procrit/luspatercept, now admitted with questionable UTI and confusion. 1. Confusion: seen by Neuro, MRI brain ordered and pending. CT normal. This appears to have resolved    2. MDS: wbc 15 k today, will stop grannix. . hgb 8.3 today, continue procrit    3. Worsening pleural effusions. Diamond Craig would proceed with thoracentesis for diagnostic/therapeutic purposes. .       4. Bladder outlet obsruction:Dr. Glenda Cruz saw her and offered lithotripsy for non obstructing stones. . she is considering this    5. Abd. Pain/bloating. . this is a chronic concern of hers... I had offered w/u with CT in the office in October but she wanted [facility] urology to work this up. CT done this admit.  Shows non obstructing renal calculi,.,, no ascites or sign of malignancy    Isacc Orozco MD

## 2020-12-08 NOTE — PROGRESS NOTES
Bedside and Verbal shift change report given to Pedrito Shearer (oncoming nurse) by Ana Laura Cope RN (offgoing nurse). Report included the following information SBAR, Kardex, ED Summary, Procedure Summary, Intake/Output, MAR, Recent Results and Cardiac Rhythm NSR.

## 2020-12-08 NOTE — PROGRESS NOTES
Bedside and Verbal shift change report given to Viridiana Sun RN (oncoming nurse) by Harry Jj (offgoing nurse). Report included the following information SBAR, Kardex, Procedure Summary, MAR, Recent Results and Cardiac Rhythm NSR.

## 2020-12-08 NOTE — PROGRESS NOTES
Problem: Falls - Risk of  Goal: *Absence of Falls  Description: Document Cristela Davis Fall Risk and appropriate interventions in the flowsheet. Outcome: Progressing Towards Goal  Note: Fall Risk Interventions:  Mobility Interventions: Strengthening exercises (ROM-active/passive)    Mentation Interventions: Adequate sleep, hydration, pain control, Door open when patient unattended, Evaluate medications/consider consulting pharmacy, Family/sitter at bedside, Update white board    Medication Interventions: Evaluate medications/consider consulting pharmacy    Elimination Interventions: Call light in reach, Toileting schedule/hourly rounds    History of Falls Interventions: Door open when patient unattended, Evaluate medications/consider consulting pharmacy, Investigate reason for fall         Problem: Patient Education: Go to Patient Education Activity  Goal: Patient/Family Education  Outcome: Progressing Towards Goal     Problem: Pressure Injury - Risk of  Goal: *Prevention of pressure injury  Description: Document Neeraj Scale and appropriate interventions in the flowsheet.   Outcome: Progressing Towards Goal  Note: Pressure Injury Interventions:  Sensory Interventions: Assess changes in LOC, Assess need for specialty bed, Float heels, Keep linens dry and wrinkle-free, Maintain/enhance activity level, Minimize linen layers    Moisture Interventions: Absorbent underpads, Apply protective barrier, creams and emollients, Minimize layers, Moisture barrier    Activity Interventions: Increase time out of bed    Mobility Interventions: Assess need for specialty bed, PT/OT evaluation, HOB 30 degrees or less    Nutrition Interventions: Document food/fluid/supplement intake    Friction and Shear Interventions: Apply protective barrier, creams and emollients, HOB 30 degrees or less                Problem: Patient Education: Go to Patient Education Activity  Goal: Patient/Family Education  Outcome: Progressing Towards Goal Problem: Risk for Spread of Infection  Goal: Prevent transmission of infectious organism to others  Description: Prevent the transmission of infectious organisms to other patients, staff members, and visitors.   Outcome: Progressing Towards Goal     Problem: Patient Education:  Go to Education Activity  Goal: Patient/Family Education  Outcome: Progressing Towards Goal     Problem: Pain  Goal: *Control of Pain  Outcome: Progressing Towards Goal  Goal: *PALLIATIVE CARE:  Alleviation of Pain  Outcome: Progressing Towards Goal     Problem: Patient Education: Go to Patient Education Activity  Goal: Patient/Family Education  Outcome: Progressing Towards Goal     Problem: Patient Education: Go to Patient Education Activity  Goal: Patient/Family Education  Outcome: Progressing Towards Goal     Problem: Patient Education: Go to Patient Education Activity  Goal: Patient/Family Education  Outcome: Progressing Towards Goal

## 2020-12-09 ENCOUNTER — APPOINTMENT (OUTPATIENT)
Dept: ULTRASOUND IMAGING | Age: 75
DRG: 689 | End: 2020-12-09
Attending: FAMILY MEDICINE
Payer: MEDICARE

## 2020-12-09 ENCOUNTER — APPOINTMENT (OUTPATIENT)
Dept: GENERAL RADIOLOGY | Age: 75
DRG: 689 | End: 2020-12-09
Attending: RADIOLOGY
Payer: MEDICARE

## 2020-12-09 LAB
AMYLASE FLD-CCNC: 12 U/L
ANION GAP SERPL CALC-SCNC: 5 MMOL/L (ref 5–15)
APPEARANCE FLD: ABNORMAL
BASOPHILS # BLD: 0 K/UL (ref 0–0.1)
BASOPHILS NFR BLD: 0 % (ref 0–1)
BODY FLD TYPE: NORMAL
BUN SERPL-MCNC: 22 MG/DL (ref 6–20)
BUN/CREAT SERPL: 43 (ref 12–20)
CALCIUM SERPL-MCNC: 8.8 MG/DL (ref 8.5–10.1)
CHLORIDE SERPL-SCNC: 113 MMOL/L (ref 97–108)
CO2 SERPL-SCNC: 28 MMOL/L (ref 21–32)
COLOR FLD: YELLOW
CREAT SERPL-MCNC: 0.51 MG/DL (ref 0.55–1.02)
DIFFERENTIAL METHOD BLD: ABNORMAL
EOSINOPHIL # BLD: 0.3 K/UL (ref 0–0.4)
EOSINOPHIL NFR BLD: 3 % (ref 0–7)
ERYTHROCYTE [DISTWIDTH] IN BLOOD BY AUTOMATED COUNT: 21.8 % (ref 11.5–14.5)
GLUCOSE FLD-MCNC: 112 MG/DL
GLUCOSE SERPL-MCNC: 98 MG/DL (ref 65–100)
HCT VFR BLD AUTO: 24.2 % (ref 35–47)
HGB BLD-MCNC: 7.8 G/DL (ref 11.5–16)
IMM GRANULOCYTES # BLD AUTO: 0 K/UL
IMM GRANULOCYTES NFR BLD AUTO: 0 %
LDH FLD L TO P-CCNC: 65 U/L
LYMPHOCYTES # BLD: 0.7 K/UL (ref 0.8–3.5)
LYMPHOCYTES NFR BLD: 6 % (ref 12–49)
LYMPHOCYTES NFR FLD: 53 %
MCH RBC QN AUTO: 33.9 PG (ref 26–34)
MCHC RBC AUTO-ENTMCNC: 32.2 G/DL (ref 30–36.5)
MCV RBC AUTO: 105.2 FL (ref 80–99)
MESOTHL CELL NFR FLD: 2 %
METAMYELOCYTES NFR BLD MANUAL: 2 %
MONOCYTES # BLD: 1.2 K/UL (ref 0–1)
MONOCYTES NFR BLD: 10 % (ref 5–13)
MONOS+MACROS NFR FLD: 34 %
NEUTROPHILS NFR FLD: 11 %
NEUTS BAND NFR BLD MANUAL: 6 % (ref 0–6)
NEUTS SEG # BLD: 9.2 K/UL (ref 1.8–8)
NEUTS SEG NFR BLD: 73 % (ref 32–75)
NRBC # BLD: 0.08 K/UL (ref 0–0.01)
NRBC BLD-RTO: 0.7 PER 100 WBC
NUC CELL # FLD: 250 /CU MM
PH FLD: 7 [PH]
PLATELET # BLD AUTO: 244 K/UL (ref 150–400)
PMV BLD AUTO: 10.9 FL (ref 8.9–12.9)
POTASSIUM SERPL-SCNC: 2.8 MMOL/L (ref 3.5–5.1)
PROT FLD-MCNC: 1.5 G/DL
RBC # BLD AUTO: 2.3 M/UL (ref 3.8–5.2)
RBC # FLD: 51 /CU MM
RBC MORPH BLD: ABNORMAL
SODIUM SERPL-SCNC: 146 MMOL/L (ref 136–145)
SPECIMEN SOURCE FLD: ABNORMAL
SPECIMEN SOURCE FLD: NORMAL
TRIGL FLD-MCNC: <15 MG/DL
WBC # BLD AUTO: 11.6 K/UL (ref 3.6–11)
WBC MORPH BLD: ABNORMAL

## 2020-12-09 PROCEDURE — 87635 SARS-COV-2 COVID-19 AMP PRB: CPT

## 2020-12-09 PROCEDURE — 88112 CYTOPATH CELL ENHANCE TECH: CPT

## 2020-12-09 PROCEDURE — 0W9B3ZZ DRAINAGE OF LEFT PLEURAL CAVITY, PERCUTANEOUS APPROACH: ICD-10-PCS | Performed by: RADIOLOGY

## 2020-12-09 PROCEDURE — 74011250637 HC RX REV CODE- 250/637: Performed by: NURSE PRACTITIONER

## 2020-12-09 PROCEDURE — 97110 THERAPEUTIC EXERCISES: CPT

## 2020-12-09 PROCEDURE — 80048 BASIC METABOLIC PNL TOTAL CA: CPT

## 2020-12-09 PROCEDURE — 65660000000 HC RM CCU STEPDOWN

## 2020-12-09 PROCEDURE — 71045 X-RAY EXAM CHEST 1 VIEW: CPT

## 2020-12-09 PROCEDURE — 85025 COMPLETE CBC W/AUTO DIFF WBC: CPT

## 2020-12-09 PROCEDURE — 36415 COLL VENOUS BLD VENIPUNCTURE: CPT

## 2020-12-09 PROCEDURE — P9047 ALBUMIN (HUMAN), 25%, 50ML: HCPCS | Performed by: FAMILY MEDICINE

## 2020-12-09 PROCEDURE — 74011250637 HC RX REV CODE- 250/637: Performed by: FAMILY MEDICINE

## 2020-12-09 PROCEDURE — 97535 SELF CARE MNGMENT TRAINING: CPT

## 2020-12-09 PROCEDURE — 74011250636 HC RX REV CODE- 250/636: Performed by: FAMILY MEDICINE

## 2020-12-09 PROCEDURE — 74011250637 HC RX REV CODE- 250/637: Performed by: INTERNAL MEDICINE

## 2020-12-09 PROCEDURE — 2709999900 HC NON-CHARGEABLE SUPPLY

## 2020-12-09 PROCEDURE — 32555 ASPIRATE PLEURA W/ IMAGING: CPT

## 2020-12-09 PROCEDURE — 74011250636 HC RX REV CODE- 250/636: Performed by: NURSE PRACTITIONER

## 2020-12-09 PROCEDURE — 97530 THERAPEUTIC ACTIVITIES: CPT

## 2020-12-09 PROCEDURE — 88305 TISSUE EXAM BY PATHOLOGIST: CPT

## 2020-12-09 RX ORDER — POTASSIUM CHLORIDE 7.45 MG/ML
10 INJECTION INTRAVENOUS
Status: COMPLETED | OUTPATIENT
Start: 2020-12-09 | End: 2020-12-09

## 2020-12-09 RX ADMIN — PHENAZOPYRIDINE HYDROCHLORIDE 100 MG: 100 TABLET ORAL at 10:05

## 2020-12-09 RX ADMIN — PHENAZOPYRIDINE HYDROCHLORIDE 100 MG: 100 TABLET ORAL at 06:43

## 2020-12-09 RX ADMIN — ALBUMIN (HUMAN) 12.5 G: 0.25 INJECTION, SOLUTION INTRAVENOUS at 00:03

## 2020-12-09 RX ADMIN — DOCUSATE SODIUM 100 MG: 100 CAPSULE, LIQUID FILLED ORAL at 09:58

## 2020-12-09 RX ADMIN — FAMOTIDINE 20 MG: 20 TABLET, FILM COATED ORAL at 18:31

## 2020-12-09 RX ADMIN — FAMOTIDINE 20 MG: 20 TABLET, FILM COATED ORAL at 09:58

## 2020-12-09 RX ADMIN — Medication 10 ML: at 22:35

## 2020-12-09 RX ADMIN — ALBUMIN (HUMAN) 12.5 G: 0.25 INJECTION, SOLUTION INTRAVENOUS at 13:36

## 2020-12-09 RX ADMIN — CEPHALEXIN 500 MG: 250 CAPSULE ORAL at 13:45

## 2020-12-09 RX ADMIN — ONDANSETRON 4 MG: 2 INJECTION INTRAMUSCULAR; INTRAVENOUS at 10:06

## 2020-12-09 RX ADMIN — PREGABALIN 150 MG: 75 CAPSULE ORAL at 09:58

## 2020-12-09 RX ADMIN — IBUPROFEN 400 MG: 400 TABLET ORAL at 22:31

## 2020-12-09 RX ADMIN — PHENAZOPYRIDINE HYDROCHLORIDE 100 MG: 100 TABLET ORAL at 22:31

## 2020-12-09 RX ADMIN — IBUPROFEN 400 MG: 400 TABLET ORAL at 18:33

## 2020-12-09 RX ADMIN — ALBUMIN (HUMAN) 12.5 G: 0.25 INJECTION, SOLUTION INTRAVENOUS at 06:43

## 2020-12-09 RX ADMIN — POTASSIUM CHLORIDE 10 MEQ: 7.46 INJECTION, SOLUTION INTRAVENOUS at 16:44

## 2020-12-09 RX ADMIN — Medication: at 10:07

## 2020-12-09 RX ADMIN — DOCUSATE SODIUM 100 MG: 100 CAPSULE, LIQUID FILLED ORAL at 18:31

## 2020-12-09 RX ADMIN — ALBUMIN (HUMAN) 12.5 G: 0.25 INJECTION, SOLUTION INTRAVENOUS at 20:44

## 2020-12-09 RX ADMIN — Medication: at 22:31

## 2020-12-09 RX ADMIN — IBUPROFEN 400 MG: 400 TABLET ORAL at 13:50

## 2020-12-09 RX ADMIN — Medication: at 15:42

## 2020-12-09 RX ADMIN — Medication 10 ML: at 13:35

## 2020-12-09 RX ADMIN — POTASSIUM CHLORIDE 10 MEQ: 7.46 INJECTION, SOLUTION INTRAVENOUS at 18:31

## 2020-12-09 RX ADMIN — POTASSIUM BICARBONATE 40 MEQ: 782 TABLET, EFFERVESCENT ORAL at 14:34

## 2020-12-09 RX ADMIN — CEPHALEXIN 500 MG: 250 CAPSULE ORAL at 06:43

## 2020-12-09 RX ADMIN — FUROSEMIDE 20 MG: 20 TABLET ORAL at 06:43

## 2020-12-09 RX ADMIN — PREGABALIN 150 MG: 75 CAPSULE ORAL at 22:31

## 2020-12-09 RX ADMIN — IBUPROFEN 400 MG: 400 TABLET ORAL at 09:58

## 2020-12-09 RX ADMIN — CEPHALEXIN 500 MG: 250 CAPSULE ORAL at 22:31

## 2020-12-09 RX ADMIN — Medication 10 ML: at 06:44

## 2020-12-09 RX ADMIN — POTASSIUM CHLORIDE 10 MEQ: 7.46 INJECTION, SOLUTION INTRAVENOUS at 15:40

## 2020-12-09 NOTE — PROGRESS NOTES
Hospitalist Progress Note NAME: Racheal Arguello :  1945 MRN:  [id number] Assessment / Plan: LLL PNA: neutropenic fever, bone marrow dysfunction that was attributed to Bactrim. -Continue telemetry monitor 
-IVF's, continue IV fluid, low urine output today 
-SARS-CoV-2: negative 
-oxygen support, pulse ox monitoring 
-pro-Vlad level, 0.11, on 2020 
-CT chest: Bilateral pleural effusions with overlying atelectasis. Coronary artery disease. Nonobstructed bilateral nephrolithiasis. Moderate rectal fecal retention. -ID following, appreciated recommendation Continue cefepime. Discussed with ID, plan for p.o. antibiotic for 7 days. 
  
Acute metabolic encephalopathy: Improved, due to above. -CT head: Indeterminate hyperdensity just above the sella turcica posteriorly. Contrast-enhanced MRI of the brain for further delineation recommended, findings consistent with mild chronic microvascular ischemic change, moderate to severe temporal predominant degree of cerebral atrophy. 
-Neuro following: MRI C spine, Brain Appreciated infectious disease recommendation. Continues Keflex. -IVF's 
-neurovascular checks 
-MRI brain: pending 
-MRI C spine: r/o cord compression-pending -TSH: 1.64 
-B12: 2,000.  
-fall precautions 
-aspiration precautions, symptoms persist may consider further intervention and diagnostics,  final urine culture pending -BCNGTD, MRSA- negative 
  
Neutropenic Fever:  No fever last 24 hours 
-monitor CBC 
-Heme-onc consult:  Appreciated recommendation 
  
Myelodysplastic syndrome: Appreciated recommendation, Anemia: Chronic vs Acute, no obvious signs of bleeding. 
-monitor CBC 
-Heme-onc consult 
  
Chronic Leukopenia/ Neutropenia: following adverse reaction to sulfa drug. Heme/Onc. Chronic UTI's, Hx Nephrolithiasis and Bladder Obstruction:  Urology following Acute Hypoxic Respiratory Failure: Resolved, on room oxygen 
-ABG stable 
-plan as above Generalized swelling: Conjunctival edema, bilateral lower extremity edema, upper extremity edema. Patient has normal renal function. Ordered echo, start Lasix daily. Continue daily weight intake output. POA Left lower leg, abrasion from fall: 5 x 3 x 0.1 cm, 100% pink, no exudate, no odor. Periwound intact from erythema. 
-daily cleanse with saline; apply Xeroform and dry dressing. 
-Sacrum, buttocks and heels: Venelex TID. 12/5/2020: Shortness of breath: On oxygen by nasal cannula 2 L. Maintains good saturations. Chest x-ray. Incentive spirometry. Lung sounds clear at this time. 12/6/2020: More alert today. Reports feeling better. 's report that she was more energetic last night. Denies shortness of breath at rest.  Her lungs clear to auscultation does not have inspiratory crackles. Will continue monitoring with stop IV fluid 12/8/2020: Generalized swelling, anasarca: CT of chest shows pleural effusion, will send patient for paracentesis, albumin, continue Lasix, good urine output, feeling better, discussed with oncologist.  Advised send pleurocentesis fluid for culture and cell count. 12/9/2020: Status post ultrasound guided left thoracentesis, 620 mL of fluid removed. Will send for labs. Plan for discharge to rehab. Chest x-ray pending. Continue monitoring. Hypokalemia: Due to Lasix, hold Lasix, replacing. Check BMP tomorrow DVTppx: Heparin Code Status: Full Code Diet: Cardiac Activity: OOB to chair TID and PRN Discharge: Inpatient rehab. Subjective: Chief Complaint / Reason for Physician Visit \"Feeling much better, less shortness of breath after paracentesis still weak. Ronold Kanner Ronold Kanner \". Discussed with RN events overnight. Review of Systems: 
Symptom Y/N Comments  Symptom Y/N Comments Fever/Chills    Chest Pain Poor Appetite    Edema Cough    Abdominal Pain Sputum    Joint Pain SOB/HALL    Pruritis/Rash Nausea/vomit    Tolerating PT/OT Diarrhea    Tolerating Diet Constipation    Other Could NOT obtain due to:   
 
Objective: VITALS:  
Last 24hrs VS reviewed since prior progress note. Most recent are: 
Patient Vitals for the past 24 hrs: 
 Temp Pulse Resp BP SpO2  
12/09/20 0953 98.4 °F (36.9 °C) 91 18 137/67 94 % 12/09/20 0521 97.7 °F (36.5 °C) 70 16 (!) 145/80 95 % 12/08/20 2356 97.6 °F (36.4 °C) 81 16 111/62 95 % 12/08/20 2056 97.5 °F (36.4 °C) 73 16 (!) 110/43 95 % 12/08/20 1542 98.3 °F (36.8 °C) 92 16 137/64 96 % Intake/Output Summary (Last 24 hours) at 12/9/2020 1330 Last data filed at 12/9/2020 8374 Gross per 24 hour Intake  Output 2700 ml Net -2700 ml I had a face to face encounter and independently examined this patient on 12/9/2020, as outlined below: PHYSICAL EXAM: 
General: Ill looking patient, frail. Alert, cooperative, no acute distress EENT:  EOMI. Anicteric sclerae. MMM. Conjunctival edema Resp:  CTA bilaterally, no wheezing or rales. No accessory muscle use CV:  Regular  rhythm,  lower extremity edema. GI:  Soft, Non distended, Non tender. +Bowel sounds Neurologic:  Alert and oriented X 3, normal speech, Psych:   Good insight. Not anxious nor agitated Skin:  No rashes. No jaundice Reviewed most current lab test results and cultures  YES Reviewed most current radiology test results   YES Review and summation of old records today    NO Reviewed patient's current orders and MAR    YES 
PMH/ reviewed - no change compared to H&P 
________________________________________________________________________ Care Plan discussed with: 
  Comments Patient Family RN Care Manager Consultant Multidiciplinary team rounds were held today with , nursing, pharmacist and clinical coordinator. Patient's plan of care was discussed; medications were reviewed and discharge planning was addressed. ________________________________________________________________________ Total NON critical care TIME: 40 minutes Total CRITICAL CARE TIME Spent:   Minutes non procedure based Comments >50% of visit spent in counseling and coordination of care    
________________________________________________________________________ Pj De Los Santos MD  
 
Procedures: see electronic medical records for all procedures/Xrays and details which were not copied into this note but were reviewed prior to creation of Plan. LABS: 
I reviewed today's most current labs and imaging studies. Pertinent labs include: 
Recent Labs 12/09/20 
0840 12/08/20 
8106 WBC 11.6* 15.8* HGB 7.8* 8.3* HCT 24.2* 25.5*  
 260 Recent Labs 12/09/20 
0840 12/08/20 
5790 * 146*  
K 2.8* 3.4*  
* 117* CO2 28 23 GLU 98 103* BUN 22* 28* CREA 0.51* 0.53* CA 8.8 8.7 Signed: Pj De Los Santos MD

## 2020-12-09 NOTE — PROGRESS NOTES
Problem: Mobility Impaired (Adult and Pediatric) Goal: *Acute Goals and Plan of Care (Insert Text) Description: FUNCTIONAL STATUS PRIOR TO ADMISSION: Patient was modified independent using a rolling walker for functional mobility. HOME SUPPORT PRIOR TO ADMISSION: The patient lived with  but did not require assist. 
 
Physical Therapy Goals Initiated 12/9/2020 1. Patient will move from supine to sit and sit to supine , scoot up and down, and roll side to side in bed with minimal assistance within 7 day(s). 2.  Patient will transfer from bed to chair and chair to bed with maximal assistance using the least restrictive device within 7 day(s). 3.  Patient will perform sit to stand with maximal assistance within 7 day(s). 4.  Patient will be mod I with assist from  for supine HEP within 7 days. 5.  Patient will be able to scoot along EOB in prep for lateral/sliding board transfers with maximum assistance within 7 days. Initiated 12/2/2020 1. Patient will move from supine to sit and sit to supine in bed with supervision/set-up within 7 day(s). 2.  Patient will transfer from bed to chair and chair to bed with minimal assistance/contact guard assist using the least restrictive device within 7 day(s). 3.  Patient will perform sit to stand with minimal assistance/contact guard assist within 7 day(s). 4.  Patient will ambulate with minimal assistance/contact guard assist for 10 feet with the least restrictive device within 7 day(s). Outcome: Progressing Towards Goal 
 PHYSICAL THERAPY TREATMENT: WEEKLY REASSESSMENT Patient: [facility] Marylou [de-identified]70 y.o. female) Date: 12/9/2020 Primary Diagnosis: SOB (shortness of breath) [R06.02] SOB (shortness of breath) [R06.02] Precautions:   Fall, Skin ASSESSMENT Patient continues with skilled PT services and is progressing towards goals.  Patient received supine in bed, asleep, supportive  at bedside. Patient very motivated, and able to sit EOB x15 minutes for ADLs with OT and dynamic + static sitting balance activities with PT. Required assist to maintain balance for all dynamic tasks and intermittent verbal and manual cues for static sitting balance. Reviewed supine HEP as below and noted bilateral lack of active DF. PRAFO boot provided and wear schedule written on whiteboard + discussed with RN (alternate feet every 2 hours). Continues to be limited by gross LE weakness and decreased endurance. Patient's progression toward goals since last assessment: despite participation in PT, she has required more assist for mobilization and no longer able to stand as she was on eval-- goals updated to be more appropriate Current Level of Function Impacting Discharge (mobility/balance): min-mod Ax2, total assist for transfers Functional Outcome Measure: The patient scored 0/28 on the Tinetti outcome measure which is indicative of high fall risk. Other factors to consider for discharge: previously mod I with RW, rapid onset of weakness PLAN : 
Goals have been updated based on progression since last assessment. Patient continues to benefit from skilled intervention to address the above impairments. Recommendations and Planned Interventions: bed mobility training, transfer training, therapeutic exercises, neuromuscular re-education, edema management/control, patient and family training/education, and therapeutic activities Frequency/Duration: Patient will be followed by physical therapy:  5 times a week to address goals. Recommendation for discharge: (in order for the patient to meet his/her long term goals) Therapy 3 hours per day 5-7 days per week This discharge recommendation: 
Has been made in collaboration with the attending provider and/or case management IF patient discharges home will need the following DME: to be determined (TBD) SUBJECTIVE:  
 Patient stated Ella Escobarks you all so much for being here.  OBJECTIVE DATA SUMMARY:  
HISTORY:   
Past Medical History:  
Diagnosis Date Chronic pain Chronic pain disorder 1984-current Degenerated intervertebral disc Melanosis of colon Nausea & vomiting Psychiatric disorder   
 chronic Pain PUD (peptic ulcer disease) Past Surgical History:  
Procedure Laterality Date ABDOMEN SURGERY PROC UNLISTED    
 HX GYN    
 HX ORTHOPAEDIC    
 HX UROLOGICAL    
 NEUROLOGICAL PROCEDURE UNLISTED Personal factors and/or comorbidities impacting plan of care: PMH Home Situation Home Environment: Private residence # Steps to Enter: 7 Rails to Enter: Yes Hand Rails : Bilateral 
One/Two Story Residence: One story Living Alone: No 
Support Systems: Spouse/Significant Other/Partner Patient Expects to be Discharged to[de-identified] Private residence Current DME Used/Available at Home: Walker, rolling, Cane, straight, Commode, bedside(transport chair) Tub or Shower Type: Shower(built-in seat) EXAMINATION/PRESENTATION/DECISION MAKING:  
Critical Behavior: 
Neurologic State: Alert Orientation Level: Oriented X4 Cognition: Appropriate for age attention/concentration Safety/Judgement: Awareness of environment Hearing: Auditory Auditory Impairment: None Edema: present L UE and LE> R 
Range Of Motion: 
AROM: Generally decreased, functional(no active DF bilaterally) Strength:   
Strength: Generally decreased, functional(non-functional B DF) Tone & Sensation:  
Tone: Abnormal 
Coordination: 
Coordination: Generally decreased, functional 
Functional Mobility: 
Bed Mobility: 
Supine to Sit: Minimum assistance; Moderate assistance;Assist x2 Sit to Supine: Moderate assistance;Assist x2 Scooting: Maximum assistance Balance:  
Sitting: Impaired; Without support Sitting - Static: Fair (occasional); Poor (constant support) Sitting - Dynamic: Poor (constant support) Therapeutic Exercises: Reviewed attempting DF for neuroplasticity and strengthening [employer] Functional Measure: 
Tinetti test: 
 
Sitting Balance: 0 Arises: 0 Attempts to Rise: 0 Immediate Standing Balance: 0 Standing Balance: 0 Nudged: 0 Eyes Closed: 0 Turn 360 Degrees - Continuous/Discontinuous: 0 Turn 360 Degrees - Steady/Unsteady: 0 Sitting Down: 0 Balance Score: 0 Balance total score Indication of Gait: 0 
R Step Length/Height: 0 
L Step Length/Height: 0 
R Foot Clearance: 0 
L Foot Clearance: 0 Step Symmetry: 0 Step Continuity: 0 Path: 0 Trunk: 0 Walking Time: 0 Gait Score: 0 Gait total score Total Score: 0/28 Overall total score Tinetti Tool Score Risk of Falls 
<19 = High Fall Risk 19-24 = Moderate Fall Risk 25-28 = Low Fall Risk Tinetti ME. Performance-Oriented Assessment of Mobility Problems in Elderly Patients. [facility] 68; [id number]. (Scoring Description: PT Bulletin Feb. 10, 1993) Older adults: Larry Cam et al, 2009; n = [address] [facility] elderly evaluated with ABC, ORQUIDEA, ADL, and IADL) · Mean ORQUIDEA score for males aged 69-68 years = 26.21(3.40) · Mean ORQUIDEA score for females age 69-68 years = 25.16(4.30) · Mean ORQUIDEA score for males over 80 years = 23.29(6.02) · Mean ORQUIDEA score for females over 80 years = 17.20(8.32) Pain Rating: LBP 7/10 Activity Tolerance:  
Fair, desaturates with exertion and requires oxygen, and requires frequent rest breaks After treatment patient left in no apparent distress:  
Supine in bed, Heels elevated for pressure relief, Call bell within reach, Caregiver / family present, and Side rails x 3 
 
COMMUNICATION/EDUCATION:  
The patients plan of care was discussed with: Occupational therapist and Registered nurse. Fall prevention education was provided and the patient/caregiver indicated understanding., Patient/family have participated as able in goal setting and plan of care. , and Patient/family agree to work toward stated goals and plan of care. Thank you for this referral. 
Bay Fisher, PT, DPT Time Calculation: 25 mins

## 2020-12-09 NOTE — PROGRESS NOTES
Bedside and Verbal shift change report given to Ana Alex (oncoming nurse) by Molly Almaguer RN (offgoing nurse). Report included the following information SBAR, Kardex, ED Summary, Procedure Summary, Intake/Output, MAR, Recent Results and Cardiac Rhythm NSR.

## 2020-12-09 NOTE — PROGRESS NOTES
Bedside and Verbal shift change report given to DEMI Toledo (oncoming nurse) by Aster Forbes (offgoing nurse). Report included the following information SBAR, Kardex, Procedure Summary, Intake/Output, MAR, Recent Results, Cardiac Rhythm NSR and Dual Neuro Assessment.

## 2020-12-09 NOTE — PROGRESS NOTES
12/9/2020 -   TORSTEN:  - RUR: 10%  - Disposition is for discharge to [facility]  - Patient will need BLS transport; on WILL CALL with [facility]  - Patient will need a negative COVID Test within 7 days of discharge; ordered 12/8 and swabbed 12/9 @ 06:40  - Patient received IM Letter 12/8    - COVID test is still pending final result  CRM: Brandie Solano, MPH, CHES; Z: [phone]    10:15 -   CM received call from [facility]lich: [phone]) indicating that patient's COVID test will need to result by 13:00 for placement today, 12/9. Otherwise placement will be tomorrow, 12/10. Nursing aware of the above and will follow up with lab. CM placed patient on WILL CALL LIST with [facility] via [employer]. CRM: Brandie Solano, MPH, CHES; Z: [phone]    15:30 -   CM rounded on patient with nursing. Patient is S/P thoracentesis today, 12/9. COVID test is still pending. Patient is not likely to discharge today, 12/9.   CRM: Brandie Solano, MPH,  [facility]; Z: [phone]

## 2020-12-09 NOTE — PROGRESS NOTES
Problem: Self Care Deficits Care Plan (Adult) Goal: *Acute Goals and Plan of Care (Insert Text) Description:  
FUNCTIONAL STATUS PRIOR TO ADMISSION: Patient was modified independent using a rolling walker for functional mobility. HOME SUPPORT: The patient lived at home with her . Per report, patient has recently required much increased assist from  with all self-care and has been providing supervision/assist prn with mobility as well. Patient's spouse reports patient has declined significantly, had been much more independent - time period of decline unclear, requires further clarification. Occupational Therapy Goals Initiated 12/3/2020 1. Patient will perform self-feeding with moderate assistance using most appropriate AE/set-up within 7 day(s). 2.  Patient will perform EOB grooming with supervision/set-up within 7 day(s). 3.  Patient will perform stand-pivot transfer to [facility] with moderate assistance x2 and RW within 7 days. 4.  Patient will perform all aspects of toileting with consistent maximal assistance within 7 day(s). 6.  Patient will participate in upper extremity therapeutic exercise/activities with moderate assistance for 5 minutes within 7 day(s). Outcome: Progressing Towards Goal 
 OCCUPATIONAL THERAPY TREATMENT Patient: Patricia Green [de-identified]70 y.o. female) Date: 12/9/2020 Diagnosis: SOB (shortness of breath) [R06.02] SOB (shortness of breath) [R06.02] PNA (pneumonia) Precautions: Fall Chart, occupational therapy assessment, plan of care, and goals were reviewed. ASSESSMENT Patient continues with skilled OT services and is progressing towards goals.  Bright affect and readily willing to work with therapy; B UEs remain weak with impaired wrist and finger extension; assessed size medium wrist splints- R not able to be used due to IV in volar wrist; L not able to be used due to need for size L related to ongoing edema which family reports is \"better today. \" (later patient sleeping- will retry L large wrist cock up functional splint tomorrow.) Patient completed grooming task max A with used of adapted comb with blue tubigrip, and max Support B UEs; cues to maintain head in upright position needed frequently to maximize safe sitting balance. Current Level of Function Impacting Discharge (ADLs): D-max A overall self care and max A-mod A bed mobility Other factors to consider for discharge: supportive spouse, actively working physician PLAN : 
Patient continues to benefit from skilled intervention to address the above impairments. Continue treatment per established plan of care. to address goals. Recommend with staff: assist with meals Recommend next OT session: L wrist support size large; AAROM B UE 
 
Recommendation for discharge: (in order for the patient to meet his/her long term goals) Therapy 3 hours per day 5-7 days per week This discharge recommendation: 
Has been made in collaboration with the attending provider and/or case management IF patient discharges home will need the following DME: TBA SUBJECTIVE:  
Patient stated Oh this feels good.  (Combing hair) OBJECTIVE DATA SUMMARY:  
Cognitive/Behavioral Status: 
Neurologic State: Alert Orientation Level: Oriented X4 Cognition: Follows commands Perception: Appears intact Perseveration: No perseveration noted Safety/Judgement: Fall prevention; Awareness of environment Functional Mobility and Transfers for ADLs: 
Bed Mobility: 
Supine to Sit: Minimum assistance; Moderate assistance;Assist x2 Sit to Supine: Moderate assistance;Assist x2 Scooting: Maximum assistance Balance: 
Sitting: Impaired; Without support Sitting - Static: Fair (occasional); Poor (constant support) Sitting - Dynamic: Poor (constant support) ADL Intervention: 
 Unable to use call bell at all to call RN; Call bell adapted with jorge call bell which patient now able to use to call for assistance on L hand; not yet able to use with R hand effectively Grooming with max A-D with adapted comb/blue tubigrip, LAUREL HE to facilitate B UE use in ADL tasks, weight bearing B UEs in sitting, with max A Cognitive Retraining Safety/Judgement: Fall prevention; Awareness of environment Activity Tolerance:  
Fair After treatment patient left in no apparent distress:  
Supine in bed, Heels elevated for pressure relief, Call bell within reach, Caregiver / family present, and Side rails x 3 
 
COMMUNICATION/COLLABORATION:  
The patients plan of care was discussed with: Physical therapist, Registered nurse, and Case management. Giovanni Khan OTR/L Time Calculation: 42 mins

## 2020-12-09 NOTE — PROGRESS NOTES
Hematology-Oncology Progress Note      Juany Perez  1945  [id number]  12/9/2020      Subjective:     Denies confusion. Improving appetite. Planned for thoracentesis noted. Allergies: Fentanyl; Celebrex [celecoxib];  Duloxetine; and Sulfa (sulfonamide antibiotics)  Current Facility-Administered Medications   Medication Dose Route Frequency Provider Last Rate Last Dose    albumin human 25% (BUMINATE) solution 12.5 g  12.5 g IntraVENous Q6H Gladys Reed MD   12.5 g at 12/09/20 0643    furosemide (LASIX) tablet 20 mg  20 mg Oral ACB&D Gladys Reed MD   20 mg at 12/09/20 0643    cephALEXin (KEFLEX) capsule 500 mg  500 mg Oral Q8H Rachel Wayne DO   500 mg at 12/09/20 7343    famotidine (PEPCID) tablet 20 mg  20 mg Oral BID Nils Magdaleno NP   20 mg at 12/08/20 1845    tbo-filgrastim (GRANIX) injection 300 mcg  300 mcg SubCUTAneous QPM Ros Lowe MD   Stopped at 12/08/20 1846    lactulose (CHRONULAC) 10 gram/15 mL solution 15 mL  10 g Oral BID Ros Lowe MD   15 mL at 12/08/20 1105    pregabalin (LYRICA) capsule 150 mg  150 mg Oral BID Gladys Reed MD   150 mg at 12/08/20 2058    ondansetron (ZOFRAN) injection 4 mg  4 mg IntraVENous Q4H PRN Dilia Melena, NP   4 mg at 12/07/20 1358    ibuprofen (MOTRIN) tablet 400 mg  400 mg Oral Q4H PRN Dilia Melena, NP   400 mg at 12/08/20 2102    phenazopyridine (PYRIDIUM) tablet 100 mg  100 mg Oral TID PRN Powers Orn, NP   100 mg at 12/09/20 4204    balsam peru-castor oiL (VENELEX) ointment   Topical TID Tanesha Farrell MD        epoetin annie-epbx (RETACRIT) injection 20,000 Units  20,000 Units SubCUTAneous Q7D Francis Medina MD   20,000 Units at 12/03/20 2105    oxyCODONE IR (ROXICODONE) tablet 5 mg  5 mg Oral Q4H PRN Dilia Melena, NP   5 mg at 12/01/20 2145    docusate sodium (COLACE) capsule 100 mg  100 mg Oral BID Kathy Lopes MD   100 mg at 12/08/20 1848    sodium chloride (NS) flush 5-40 mL  5-40 mL IntraVENous Q8H Marina Avelar MD   10 mL at 12/09/20 0644    sodium chloride (NS) flush 5-40 mL  5-40 mL IntraVENous PRN Marina Avelar MD        acetaminophen (TYLENOL) tablet 650 mg  650 mg Oral Q4H PRN Marina Avelar MD   650 mg at 12/04/20 2127    [Held by provider] heparin (porcine) injection 5,000 Units  5,000 Units SubCUTAneous Q8H Marina Avelar MD   5,000 Units at 12/06/20 1411     Objective:     Patient Vitals for the past 24 hrs:   BP Temp Pulse Resp SpO2 Weight   12/09/20 0521 (!) 145/80 97.7 °F (36.5 °C) 70 16 95 % 69.8 kg (153 lb 14.1 oz)   12/08/20 2356 111/62 97.6 °F (36.4 °C) 81 16 95 %    12/08/20 2056 (!) 110/43 97.5 °F (36.4 °C) 73 16 95 %    12/08/20 1542 137/64 98.3 °F (36.8 °C) 92 16 96 %    12/08/20 1025 132/66 97.8 °F (36.6 °C) 79 16 94 %        Gen: NAD  HEENT: PERRL, Sclerae anicteric  Cv: RRR without m/r/g  Pulm: CTA bilaterally  Abd: NABS, NTND, No HSM  Ext: No c/c/e    Available labs reviewed:  Labs:    Recent Results (from the past 24 hour(s))   SARS-COV-2    Collection Time: 12/09/20  6:40 AM   Result Value Ref Range    Specimen source Nasopharyngeal      SARS-CoV-2 PENDING     SARS-CoV-2 PENDING     Specimen source NP SWAB     COVID-19 rapid test PENDING     Specimen type NP Swab      Health status PENDING     COVID-19 PENDING    METABOLIC PANEL, BASIC    Collection Time: 12/09/20  8:40 AM   Result Value Ref Range    Sodium 146 (H) 136 - 145 mmol/L    Potassium 2.8 (L) 3.5 - 5.1 mmol/L    Chloride 113 (H) 97 - 108 mmol/L    CO2 28 21 - 32 mmol/L    Anion gap 5 5 - 15 mmol/L    Glucose 98 65 - 100 mg/dL    BUN 22 (H) 6 - 20 MG/DL    Creatinine 0.51 (L) 0.55 - 1.02 MG/DL    BUN/Creatinine ratio 43 (H) 12 - 20      GFR est AA >60 >60 ml/min/1.73m2    GFR est non-AA >60 >60 ml/min/1.73m2    Calcium 8.8 8.5 - 10.1 MG/DL         Assessment and Plan     69 y/o woman with MDS (MLD-RS, low risk) on outpatient procrit/luspatercept, now admitted with questionable UTI and confusion.    1. Confusion: seen by Neuro. CT normal. This appears to have resolved     2. MDS: CBC from today pending. Would transfuse for HGB < 7 g/dL. On EPO.      3. Worsening pleural effusions. Neeru Mccray would proceed with thoracentesis for diagnostic/therapeutic purposes. .      4. Bladder outlet obsruction:Dr. Matt Christiansen saw her and offered lithotripsy for non obstructing stones. . she is considering this     5. Abd. Pain/bloating: continue eval as an outpatient. Thank you for allowing us to participate in the care of this very pleasant patient.     Diamond Butcher MD  Hematology/Oncology  Phone [phone]

## 2020-12-09 NOTE — PROGRESS NOTES
Bedside and Verbal shift change report given to Paulette Tobar RN (oncoming nurse) by Cailin Baugh RN (offgoing nurse). Report included the following information SBAR, Kardex, ED Summary, [place] and Recent Results.

## 2020-12-10 LAB
ANION GAP SERPL CALC-SCNC: 6 MMOL/L (ref 5–15)
BUN SERPL-MCNC: 23 MG/DL (ref 6–20)
BUN/CREAT SERPL: 38 (ref 12–20)
CALCIUM SERPL-MCNC: 8.7 MG/DL (ref 8.5–10.1)
CHLORIDE SERPL-SCNC: 114 MMOL/L (ref 97–108)
CO2 SERPL-SCNC: 28 MMOL/L (ref 21–32)
CREAT SERPL-MCNC: 0.6 MG/DL (ref 0.55–1.02)
ERYTHROCYTE [DISTWIDTH] IN BLOOD BY AUTOMATED COUNT: 22.4 % (ref 11.5–14.5)
GLUCOSE SERPL-MCNC: 100 MG/DL (ref 65–100)
HCT VFR BLD AUTO: 21.9 % (ref 35–47)
HEALTH STATUS, XMCV2T: NORMAL
HGB BLD-MCNC: 6.9 G/DL (ref 11.5–16)
HISTORY CHECKED?,CKHIST: NORMAL
MCH RBC QN AUTO: 33.7 PG (ref 26–34)
MCHC RBC AUTO-ENTMCNC: 31.5 G/DL (ref 30–36.5)
MCV RBC AUTO: 106.8 FL (ref 80–99)
NRBC # BLD: 0.05 K/UL (ref 0–0.01)
NRBC BLD-RTO: 0.6 PER 100 WBC
PLATELET # BLD AUTO: 222 K/UL (ref 150–400)
PMV BLD AUTO: 10.6 FL (ref 8.9–12.9)
POTASSIUM SERPL-SCNC: 3.4 MMOL/L (ref 3.5–5.1)
RBC # BLD AUTO: 2.05 M/UL (ref 3.8–5.2)
SARS-COV-2, COV2: NOT DETECTED
SODIUM SERPL-SCNC: 148 MMOL/L (ref 136–145)
SOURCE, COVRS: NORMAL
SPECIMEN SOURCE, FCOV2M: NORMAL
SPECIMEN TYPE, XMCV1T: NORMAL
WBC # BLD AUTO: 9 K/UL (ref 3.6–11)

## 2020-12-10 PROCEDURE — 74011250636 HC RX REV CODE- 250/636: Performed by: FAMILY MEDICINE

## 2020-12-10 PROCEDURE — 74011250637 HC RX REV CODE- 250/637: Performed by: NURSE PRACTITIONER

## 2020-12-10 PROCEDURE — 80048 BASIC METABOLIC PNL TOTAL CA: CPT

## 2020-12-10 PROCEDURE — 74011250637 HC RX REV CODE- 250/637: Performed by: FAMILY MEDICINE

## 2020-12-10 PROCEDURE — 86923 COMPATIBILITY TEST ELECTRIC: CPT

## 2020-12-10 PROCEDURE — 85027 COMPLETE CBC AUTOMATED: CPT

## 2020-12-10 PROCEDURE — 74011250636 HC RX REV CODE- 250/636: Performed by: INTERNAL MEDICINE

## 2020-12-10 PROCEDURE — 74011250637 HC RX REV CODE- 250/637: Performed by: INTERNAL MEDICINE

## 2020-12-10 PROCEDURE — 86900 BLOOD TYPING SEROLOGIC ABO: CPT

## 2020-12-10 PROCEDURE — 74011250636 HC RX REV CODE- 250/636: Performed by: NURSE PRACTITIONER

## 2020-12-10 PROCEDURE — P9016 RBC LEUKOCYTES REDUCED: HCPCS

## 2020-12-10 PROCEDURE — 36415 COLL VENOUS BLD VENIPUNCTURE: CPT

## 2020-12-10 PROCEDURE — P9047 ALBUMIN (HUMAN), 25%, 50ML: HCPCS | Performed by: FAMILY MEDICINE

## 2020-12-10 PROCEDURE — 36430 TRANSFUSION BLD/BLD COMPNT: CPT

## 2020-12-10 PROCEDURE — 65660000000 HC RM CCU STEPDOWN

## 2020-12-10 PROCEDURE — 30233N1 TRANSFUSION OF NONAUTOLOGOUS RED BLOOD CELLS INTO PERIPHERAL VEIN, PERCUTANEOUS APPROACH: ICD-10-PCS | Performed by: INTERNAL MEDICINE

## 2020-12-10 RX ORDER — POTASSIUM CHLORIDE 750 MG/1
40 TABLET, FILM COATED, EXTENDED RELEASE ORAL DAILY
Status: DISCONTINUED | OUTPATIENT
Start: 2020-12-10 | End: 2020-12-11 | Stop reason: HOSPADM

## 2020-12-10 RX ORDER — SODIUM CHLORIDE 9 MG/ML
250 INJECTION, SOLUTION INTRAVENOUS AS NEEDED
Status: DISCONTINUED | OUTPATIENT
Start: 2020-12-10 | End: 2020-12-11 | Stop reason: HOSPADM

## 2020-12-10 RX ADMIN — Medication: at 08:34

## 2020-12-10 RX ADMIN — LACTULOSE 15 ML: 20 SOLUTION ORAL at 17:21

## 2020-12-10 RX ADMIN — POTASSIUM CHLORIDE 40 MEQ: 750 TABLET, FILM COATED, EXTENDED RELEASE ORAL at 10:52

## 2020-12-10 RX ADMIN — FAMOTIDINE 20 MG: 20 TABLET, FILM COATED ORAL at 17:20

## 2020-12-10 RX ADMIN — Medication: at 17:26

## 2020-12-10 RX ADMIN — PHENAZOPYRIDINE HYDROCHLORIDE 100 MG: 100 TABLET ORAL at 20:44

## 2020-12-10 RX ADMIN — Medication 10 ML: at 15:16

## 2020-12-10 RX ADMIN — IBUPROFEN 400 MG: 400 TABLET ORAL at 15:14

## 2020-12-10 RX ADMIN — PREGABALIN 150 MG: 75 CAPSULE ORAL at 20:44

## 2020-12-10 RX ADMIN — ALBUMIN (HUMAN) 12.5 G: 0.25 INJECTION, SOLUTION INTRAVENOUS at 08:32

## 2020-12-10 RX ADMIN — ONDANSETRON 4 MG: 2 INJECTION INTRAMUSCULAR; INTRAVENOUS at 05:02

## 2020-12-10 RX ADMIN — PREGABALIN 150 MG: 75 CAPSULE ORAL at 08:31

## 2020-12-10 RX ADMIN — CEPHALEXIN 500 MG: 250 CAPSULE ORAL at 15:14

## 2020-12-10 RX ADMIN — IBUPROFEN 400 MG: 400 TABLET ORAL at 22:17

## 2020-12-10 RX ADMIN — Medication 10 ML: at 22:56

## 2020-12-10 RX ADMIN — IBUPROFEN 400 MG: 400 TABLET ORAL at 05:02

## 2020-12-10 RX ADMIN — CEPHALEXIN 500 MG: 250 CAPSULE ORAL at 09:30

## 2020-12-10 RX ADMIN — LACTULOSE 15 ML: 20 SOLUTION ORAL at 08:32

## 2020-12-10 RX ADMIN — EPOETIN ALFA-EPBX 20000 UNITS: 10000 INJECTION, SOLUTION INTRAVENOUS; SUBCUTANEOUS at 22:54

## 2020-12-10 RX ADMIN — FUROSEMIDE 20 MG: 20 TABLET ORAL at 17:19

## 2020-12-10 RX ADMIN — ALBUMIN (HUMAN) 12.5 G: 0.25 INJECTION, SOLUTION INTRAVENOUS at 20:44

## 2020-12-10 RX ADMIN — IBUPROFEN 400 MG: 400 TABLET ORAL at 10:52

## 2020-12-10 RX ADMIN — Medication: at 22:55

## 2020-12-10 RX ADMIN — PHENAZOPYRIDINE HYDROCHLORIDE 100 MG: 100 TABLET ORAL at 12:28

## 2020-12-10 RX ADMIN — FAMOTIDINE 20 MG: 20 TABLET, FILM COATED ORAL at 08:31

## 2020-12-10 RX ADMIN — CEPHALEXIN 500 MG: 250 CAPSULE ORAL at 22:17

## 2020-12-10 RX ADMIN — ALBUMIN (HUMAN) 12.5 G: 0.25 INJECTION, SOLUTION INTRAVENOUS at 15:12

## 2020-12-10 RX ADMIN — DOCUSATE SODIUM 100 MG: 100 CAPSULE, LIQUID FILLED ORAL at 17:20

## 2020-12-10 NOTE — PROGRESS NOTES
Hospitalist Progress Note NAME: Lyle Search :  1945 MRN:  [id number] Subjective:  
Pt seen and examined. Feels weak. Doesn't like sig edema all over her body. No acute events overnight. HB <7. Will transfuse 1 unit of RBCs. Assessment / Plan: LLL PNA: neutropenic fever, bone marrow dysfunction that was attributed to Bactrim. -IVF's- SARS-CoV-2: negative-oxygen support, pulse ox monitoring 
-CT cap: Bilateral pleural effusions with overlying atelectasis. Coronary artery disease. Nonobstructed bilateral nephrolithiasis. -ID following, appreciated recommendation- po [facility] for 7days. 
  
Acute metabolic encephalopathy: Improved, due to above. -CT head: Indeterminate hyperdensity just above the sella turcica posteriorly. Contrast-enhanced MRI of the brain for further delineation recommended, findings consistent with mild chronic microvascular ischemic change, moderate to severe temporal predominant degree of cerebral atrophy. 
-Neuro following:-neurovascular checks-TSH: 1.64- B12: >2000. Op EMG/MRI brain-c-spine 
-fall precautions-aspiration precautions, -BCs -ve. 
  
Neutropenic Fever:  -monitor CBC-Heme-onc consult following.  
MDS: Appreciated recommendation, Anemia: Chronic vs Acute, no signs of bleeding-monitor CBC- transfuse for HB<7. 
Chronic Leukopenia/ Neutropenia: following adverse reaction to sulfa drug. Chronic UTI's, Hx Nephrolithiasis and Bladder Obstruction:  Urology following Acute Hypoxic Respiratory Failure: Resolved, on room air Generalized edema: Conjunctival edema, b/l LEs edema, UEs edema.   
- Echo preserved ef, start Lasix daily. Continue daily weight intake output. POA Left lower leg, abrasion from fall: 5 x 3 x 0.1 cm, 100% pink, no exudate, no odor. Periwound intact from erythema. Wound care following. 
-daily cleanse with saline; apply Xeroform and dry dressing. 
-Sacrum, buttocks and heels: Venelex TID. s/p US guided left thoracentesis, 12/09: 620 mL of fluid removed. Hypokalemia: Due to Lasix, hold Lasix, replacing. Monitor #. HyperNatremia and HyperChloremia: likely iatrogenic. Encourage water intake. monitor DVTppx: Heparin Code Status: Full Code Discharge: Inpatient rehab. .  
 
Review of Systems: 
Symptom Y/N Comments  Symptom Y/N Comments Fever/Chills    Chest Pain Poor Appetite    Edema Cough    Abdominal Pain Sputum    Joint Pain SOB/HALL    Pruritis/Rash Nausea/vomit    Tolerating PT/OT Diarrhea    Tolerating Diet Constipation    Other Could NOT obtain due to:   
 
Objective: VITALS:  
Last 24hrs VS reviewed since prior progress note. Most recent are: 
Patient Vitals for the past 24 hrs: 
 Temp Pulse Resp BP SpO2  
12/10/20 0826 98.4 °F (36.9 °C) 86 16 139/65 93 % 12/10/20 0426 98.2 °F (36.8 °C)  16 (!) 128/55 97 % 12/09/20 2319 98.5 °F (36.9 °C) 75 16 (!) 145/71 97 % 12/09/20 2030 98.8 °F (37.1 °C) 91 16 (!) 133/52 91 % 12/09/20 1647     94 % 12/09/20 1640     99 % 12/09/20 1520 98.4 °F (36.9 °C) 98 16 (!) 124/57 95 % 12/09/20 1352 98.9 °F (37.2 °C) 97 16 130/68 96 % 12/09/20 0953 98.4 °F (36.9 °C) 91 18 137/67 94 % Intake/Output Summary (Last 24 hours) at 12/10/2020 7360 Last data filed at 12/9/2020 1521 Gross per 24 hour Intake  Output 700 ml Net -700 ml I had a face to face encounter and independently examined this patient on 12/10/2020, as outlined below: PHYSICAL EXAM: 
General: Ill looking patient, frail. Alert, cooperative, no acute distress EENT:  EOMI. Anicteric sclerae. MMM. Conjunctival edema Resp:  CTA bilaterally, no wheezing or rales. No accessory muscle use CV:  Regular  rhythm,  lower extremity edema. GI:  Soft, Non distended, Non tender. +Bowel sounds Neurologic:  Alert and oriented X 3, normal speech, Psych:   Good insight. Not anxious nor agitated Skin:  No rashes. No jaundice Reviewed most current lab test results and cultures  YES Reviewed most current radiology test results   YES Review and summation of old records today    NO Reviewed patient's current orders and MAR    YES 
PMH/SH reviewed - no change compared to H&P 
________________________________________________________________________ Care Plan discussed with: 
  Comments Patient Family RN Care Manager Consultant Multidiciplinary team rounds were held today with , nursing, pharmacist and clinical coordinator. Patient's plan of care was discussed; medications were reviewed and discharge planning was addressed. ________________________________________________________________________ Total NON critical care TIME: 40 minutes Total CRITICAL CARE TIME Spent:   Minutes non procedure based Comments >50% of visit spent in counseling and coordination of care    
________________________________________________________________________ Hussain Michel MD  
 
Procedures: see electronic medical records for all procedures/Xrays and details which were not copied into this note but were reviewed prior to creation of Plan. LABS: 
I reviewed today's most current labs and imaging studies. Pertinent labs include: 
Recent Labs 12/10/20 
8314 12/09/20 
0840 12/08/20 
6989 WBC 9.0 11.6* 15.8* HGB 6.9* 7.8* 8.3* HCT 21.9* 24.2* 25.5*  
 244 260 Recent Labs 12/10/20 
3505 12/09/20 
0840 12/08/20 
4466 * 146* 146*  
K 3.4* 2.8* 3.4*  
* 113* 117* CO2 28 28 23  98 103* BUN 23* 22* 28* CREA 0.60 0.51* 0.53* CA 8.7 8.8 8.7 Signed: Hussain Michel MD

## 2020-12-10 NOTE — PROGRESS NOTES
NUTRITION Pt seen for:    
[]           Supplements  [x]             PO intake check  
[]           Food Allergies  []             Food Preferences/tolerances   
[]           Rescreen   []             Education []           Diet order clarification []             Other RECOMMENDATIONS:  
 
Continue with cardiac diet as ordered SUBJECTIVE/OBJECTIVE:  
 
Pt screened for length of stay. Pt was admitted on 11/30 with AMS. Found to have UTI, LLL PNA, pleural effusion. Pt has been accepted at [facility], but is not quite medically ready for d/c.  Pt's appetite has been improving, although still not great. Based on weight hx pt's weight has been stable. RD will follow on the periphery. Diet:  cardiac Supplements: none Intake: []           Good     [x]           Fair      []           Poor Patient Vitals for the past 100 hrs: 
 % Diet Eaten 12/06/20 1225 10 % Weight Changes:  
[]            Loss 
[]            Gain 
[x]            Stable Wt Readings from Last 10 Encounters:  
12/10/20 71.7 kg (158 lb)  
07/21/15 59 kg (130 lb) 04/23/12 59 kg (130 lb) Nutrition Problems Identified: 
[x]       None: not at nutrition risk 
[]           Specified food preferences  
[]           Dislikes supplements             
[]           Allergies []           Difficulty chewing or swallowing  
[]           Poor dentition  
[]           Nausea/Vomiting 
[]           Constipation []           Diarrhea PLAN:  
[]           Obtained/adjusted food preferences/tolerances and/or snacks options []           Dislikes supplements will try a substitution []           Modify diet for food allergies []           Adjust texture due to difficulty chewing [x]    Continue current diet 
[]           Educated patient 
[]           Add Supplements 
[]          Rescreen Leon Henry RD, CSP Contact via [employer]

## 2020-12-10 NOTE — PROGRESS NOTES
TORSTEN:  Anticipate discharge to [facility] for inpatient rehab. Pt is medically accepted for transfer, covid resulted. Spoke with attending and pt not medically ready for discharge today, will notify [facility] liason. Received call from Mimi Rodrigues, [phone] who did confirm they can accept pt today. Noted need for blood transfusion, will [place] back with attending to ask if pt would be cleared after transfusion. [facility] confirmed they would be able to accept later in the day once transfusion is completed.   
 
Vidal Graf, MSW

## 2020-12-10 NOTE — PROGRESS NOTES
Hematology-Oncology Progress Note [address] [facility] 1945 
[id number] 
12/10/2020 Subjective:  
 
Pt is comfortable, but concerned about lower hemoglobin Allergies: Fentanyl; Celebrex [celecoxib]; Duloxetine; and Sulfa (sulfonamide antibiotics) Current Facility-Administered Medications Medication Dose Route Frequency Provider Last Rate Last Dose  potassium chloride SR (KLOR-CON 10) tablet 40 mEq  40 mEq Oral DAILY Paul Woods MD      
 0.9% sodium chloride infusion 250 mL  250 mL IntraVENous PRN Paul Woods MD      
 albumin human 25% (BUMINATE) solution 12.5 g  12.5 g IntraVENous Q6H Patrick Rosenbaum MD 60 mL/hr at 12/09/20 1336 12.5 g at 12/10/20 3213  furosemide (LASIX) tablet 20 mg  20 mg Oral ACB&D Patrick Rosenbaum MD   20 mg at 12/09/20 3499  cephALEXin (KEFLEX) capsule 500 mg  500 mg Oral Q8H Cecil Rivera, DO   500 mg at 12/10/20 0930  famotidine (PEPCID) tablet 20 mg  20 mg Oral BID Whitney Magdaleno NP   20 mg at 12/10/20 0831  
 lactulose (CHRONULAC) 10 gram/15 mL solution 15 mL  10 g Oral BID Socrates Red MD   15 mL at 12/10/20 6329  pregabalin (LYRICA) capsule 150 mg  150 mg Oral BID Patrick Rosenbaum MD   150 mg at 12/10/20 0831  
 ondansetron [facility]) injection 4 mg  4 mg IntraVENous Q4H PRN Claribel Ridmichelle NP   4 mg at 12/10/20 0502  ibuprofen (MOTRIN) tablet 400 mg  400 mg Oral Q4H PRN Claribel Riddles, NP   400 mg at 12/10/20 0502  phenazopyridine (PYRIDIUM) tablet 100 mg  100 mg Oral TID PRN Keyanna Carlson NP   100 mg at 12/09/20 2231  balsam peru-castor oiL (VENELEX) ointment   Topical TID Cindy Blackman MD      
 epoetin annie-epbx (RETACRIT) injection 20,000 Units  20,000 Units SubCUTAneous Q7D Priya Reddy MD   20,000 Units at 12/03/20 2105  
 oxyCODONE IR (ROXICODONE) tablet 5 mg  5 mg Oral Q4H PRN Claribel Stout, NP   5 mg at 12/01/20 2145  docusate sodium (COLACE) capsule 100 mg  100 mg Oral BID Sudha Blocker, MD   100 mg at 12/09/20 1831  
 sodium chloride (NS) flush 5-40 mL  5-40 mL IntraVENous Q8H Miley Cannon MD   10 mL at 12/09/20 2235  sodium chloride (NS) flush 5-40 mL  5-40 mL IntraVENous PRN Miley Cannon MD      
 acetaminophen (TYLENOL) tablet 650 mg  650 mg Oral Q4H PRN Miley Cannon MD   650 mg at 12/04/20 2127  [Held by provider] heparin (porcine) injection 5,000 Units  5,000 Units SubCUTAneous Q8H Miley Cannon MD   5,000 Units at 12/06/20 1411 Objective:  
 
Patient Vitals for the past 24 hrs: 
 BP Temp Pulse Resp SpO2 Weight 12/10/20 0826 139/65 98.4 °F (36.9 °C) 86 16 93 %   
12/10/20 0426 (!) 128/55 98.2 °F (36.8 °C)  16 97 % 71.7 kg (158 lb) 12/09/20 2319 (!) 145/71 98.5 °F (36.9 °C) 75 16 97 %   
12/09/20 2030 (!) 133/52 98.8 °F (37.1 °C) 91 16 91 %   
12/09/20 1647     94 %   
12/09/20 1640     99 %   
12/09/20 1520 (!) 124/57 98.4 °F (36.9 °C) 98 16 95 %   
12/09/20 1352 130/68 98.9 °F (37.2 °C) 97 16 96 %  Gen: NAD HEENT: PERRL, Sclerae anicteric Cv: RRR without m/r/g Pulm: aerating well bilat Abd: NABS, NTND, No HSM Ext: No c/c/e Available labs reviewed: 
Labs:   
Recent Results (from the past 24 hour(s)) METABOLIC PANEL, BASIC Collection Time: 12/10/20  4:34 AM  
Result Value Ref Range Sodium 148 (H) 136 - 145 mmol/L Potassium 3.4 (L) 3.5 - 5.1 mmol/L Chloride 114 (H) 97 - 108 mmol/L  
 CO2 28 21 - 32 mmol/L Anion gap 6 5 - 15 mmol/L Glucose 100 65 - 100 mg/dL BUN 23 (H) 6 - 20 MG/DL Creatinine 0.60 0.55 - 1.02 MG/DL  
 BUN/Creatinine ratio 38 (H) 12 - 20 GFR est AA >60 >60 ml/min/1.73m2 GFR est non-AA >60 >60 ml/min/1.73m2 Calcium 8.7 8.5 - 10.1 MG/DL  
CBC W/O DIFF Collection Time: 12/10/20  4:36 AM  
Result Value Ref Range WBC 9.0 3.6 - 11.0 K/uL  
 RBC 2.05 (L) 3.80 - 5.20 M/uL HGB 6.9 (L) 11.5 - 16.0 g/dL HCT 21.9 (L) 35.0 - 47.0 %  .8 (H) 80.0 - 99.0 FL  
 MCH 33.7 26.0 - 34.0 PG  
 MCHC 31.5 30.0 - 36.5 g/dL RDW 22.4 (H) 11.5 - 14.5 % PLATELET 023 392 - 017 K/uL MPV 10.6 8.9 - 12.9 FL  
 NRBC 0.6 (H) 0  WBC ABSOLUTE NRBC 0.05 (H) 0.00 - 0.01 K/uL Assessment and Plan  
 
71 y/o woman with MDS (MLD-RS, low risk) on outpatient procrit/luspatercept, now admitted with questionable UTI and confusion. 1. Confusion: seen by Neuro, MRI brain ordered and pending. CT normal. This appears to have resolved 2. MDS: wbc 9 k today,  grannix stopped on 12/9. . hgb 6.9 today, one unit prbc's ordered by hospitalist.. continue procrit 3. Worsening pleural effusions. . pt had 600 ccs of yellow fluid tapped off yesterday. .. cytology pending 4. Bladder outlet obsruction:Dr. Sabina Bryant saw her and offered lithotripsy for non obstructing stones. . she is considering this 5. Abd. Pain/bloating. . this is a chronic concern of hers. .. I had offered w/u with CT in the office in October but she wanted [facility] urology to work this up. CT done this admit. Shows non obstructing renal calculi,.,, no ascites or sign of malignancy 6. Disposition. . [facility] Idolina Gaucher, MD

## 2020-12-10 NOTE — PROGRESS NOTES
Occupational Therapy Patient chart reviewed in prep for OT treatment session. Note patient's most recent Hgb 6.9 and patient currently receiving transfusion. Assisted patient and spouse with positioning for comfort - will follow-up tomorrow for session as able and appropriate. Lida Saleh OTR/L Time Spent: 5 minutes

## 2020-12-11 VITALS
BODY MASS INDEX: 25.1 KG/M2 | HEART RATE: 74 BPM | RESPIRATION RATE: 16 BRPM | TEMPERATURE: 98.7 F | WEIGHT: 147.05 LBS | OXYGEN SATURATION: 92 % | HEIGHT: 64 IN | DIASTOLIC BLOOD PRESSURE: 76 MMHG | SYSTOLIC BLOOD PRESSURE: 189 MMHG

## 2020-12-11 LAB
ABO + RH BLD: NORMAL
BLD PROD TYP BPU: NORMAL
BLOOD GROUP ANTIBODIES SERPL: NORMAL
BPU ID: NORMAL
CROSSMATCH RESULT,%XM: NORMAL
ERYTHROCYTE [DISTWIDTH] IN BLOOD BY AUTOMATED COUNT: 21.2 % (ref 11.5–14.5)
HCT VFR BLD AUTO: 26.7 % (ref 35–47)
HGB BLD-MCNC: 8.5 G/DL (ref 11.5–16)
MCH RBC QN AUTO: 32.9 PG (ref 26–34)
MCHC RBC AUTO-ENTMCNC: 31.8 G/DL (ref 30–36.5)
MCV RBC AUTO: 103.5 FL (ref 80–99)
NRBC # BLD: 0.05 K/UL (ref 0–0.01)
NRBC BLD-RTO: 0.5 PER 100 WBC
PLATELET # BLD AUTO: 197 K/UL (ref 150–400)
PMV BLD AUTO: 10.7 FL (ref 8.9–12.9)
RBC # BLD AUTO: 2.58 M/UL (ref 3.8–5.2)
SPECIMEN EXP DATE BLD: NORMAL
STATUS OF UNIT,%ST: NORMAL
UNIT DIVISION, %UDIV: 0
WBC # BLD AUTO: 9.8 K/UL (ref 3.6–11)

## 2020-12-11 PROCEDURE — P9047 ALBUMIN (HUMAN), 25%, 50ML: HCPCS | Performed by: FAMILY MEDICINE

## 2020-12-11 PROCEDURE — 74011250637 HC RX REV CODE- 250/637: Performed by: INTERNAL MEDICINE

## 2020-12-11 PROCEDURE — 85027 COMPLETE CBC AUTOMATED: CPT

## 2020-12-11 PROCEDURE — 74011250637 HC RX REV CODE- 250/637: Performed by: FAMILY MEDICINE

## 2020-12-11 PROCEDURE — 74011250637 HC RX REV CODE- 250/637: Performed by: NURSE PRACTITIONER

## 2020-12-11 PROCEDURE — 74011250636 HC RX REV CODE- 250/636: Performed by: FAMILY MEDICINE

## 2020-12-11 PROCEDURE — 36415 COLL VENOUS BLD VENIPUNCTURE: CPT

## 2020-12-11 RX ORDER — FAMOTIDINE 20 MG/1
20 TABLET, FILM COATED ORAL 2 TIMES DAILY
Qty: 60 TAB | Refills: 0 | Status: SHIPPED | OUTPATIENT
Start: 2020-12-11 | End: 2021-01-10

## 2020-12-11 RX ORDER — CEPHALEXIN 500 MG/1
500 CAPSULE ORAL EVERY 8 HOURS
Qty: 15 CAP | Refills: 0 | Status: SHIPPED | OUTPATIENT
Start: 2020-12-11 | End: 2020-12-16

## 2020-12-11 RX ORDER — FUROSEMIDE 20 MG/1
20 TABLET ORAL DAILY
Qty: 7 TAB | Refills: 0 | Status: SHIPPED | OUTPATIENT
Start: 2020-12-11 | End: 2020-12-18

## 2020-12-11 RX ORDER — OXYCODONE HYDROCHLORIDE 5 MG/1
5 TABLET ORAL
Qty: 8 TAB | Refills: 0 | Status: SHIPPED | OUTPATIENT
Start: 2020-12-11 | End: 2020-12-14

## 2020-12-11 RX ADMIN — ACETAMINOPHEN 650 MG: 325 TABLET ORAL at 09:01

## 2020-12-11 RX ADMIN — POTASSIUM CHLORIDE 40 MEQ: 750 TABLET, FILM COATED, EXTENDED RELEASE ORAL at 09:02

## 2020-12-11 RX ADMIN — CEPHALEXIN 500 MG: 250 CAPSULE ORAL at 15:01

## 2020-12-11 RX ADMIN — PHENAZOPYRIDINE HYDROCHLORIDE 100 MG: 100 TABLET ORAL at 05:55

## 2020-12-11 RX ADMIN — LACTULOSE 15 ML: 20 SOLUTION ORAL at 09:03

## 2020-12-11 RX ADMIN — DOCUSATE SODIUM 100 MG: 100 CAPSULE, LIQUID FILLED ORAL at 09:02

## 2020-12-11 RX ADMIN — FUROSEMIDE 20 MG: 20 TABLET ORAL at 09:02

## 2020-12-11 RX ADMIN — Medication 10 ML: at 05:55

## 2020-12-11 RX ADMIN — ALBUMIN (HUMAN) 12.5 G: 0.25 INJECTION, SOLUTION INTRAVENOUS at 09:03

## 2020-12-11 RX ADMIN — CEPHALEXIN 500 MG: 250 CAPSULE ORAL at 05:55

## 2020-12-11 RX ADMIN — ALBUMIN (HUMAN) 12.5 G: 0.25 INJECTION, SOLUTION INTRAVENOUS at 15:01

## 2020-12-11 RX ADMIN — Medication: at 17:16

## 2020-12-11 RX ADMIN — Medication 10 ML: at 15:03

## 2020-12-11 RX ADMIN — FAMOTIDINE 20 MG: 20 TABLET, FILM COATED ORAL at 09:02

## 2020-12-11 RX ADMIN — PREGABALIN 150 MG: 75 CAPSULE ORAL at 09:02

## 2020-12-11 RX ADMIN — ALBUMIN (HUMAN) 12.5 G: 0.25 INJECTION, SOLUTION INTRAVENOUS at 02:27

## 2020-12-11 RX ADMIN — Medication: at 15:00

## 2020-12-11 RX ADMIN — IBUPROFEN 400 MG: 400 TABLET ORAL at 18:29

## 2020-12-11 NOTE — PROGRESS NOTES
Hematology-Oncology Progress Note [address] [facility] 1945 
[id number] 
12/11/2020 Subjective:  
 
Pt is comfortable,tolerated transfusion,, generally weak Allergies: Fentanyl; Celebrex [celecoxib]; Duloxetine; and Sulfa (sulfonamide antibiotics) Current Facility-Administered Medications Medication Dose Route Frequency Provider Last Rate Last Dose  potassium chloride SR (KLOR-CON 10) tablet 40 mEq  40 mEq Oral DAILY Paul Woods MD   40 mEq at 12/11/20 0902  
 0.9% sodium chloride infusion 250 mL  250 mL IntraVENous PRN Paul Woods MD      
 albumin human 25% (BUMINATE) solution 12.5 g  12.5 g IntraVENous Q6H Jeffy Lucero MD 60 mL/hr at 12/09/20 1336 12.5 g at 12/11/20 4508  furosemide (LASIX) tablet 20 mg  20 mg Oral ACB&D Jeffy Lucero MD   20 mg at 12/11/20 6147  cephALEXin (KEFLEX) capsule 500 mg  500 mg Oral Q8H Lenell Daily, DO   500 mg at 12/11/20 0183  famotidine (PEPCID) tablet 20 mg  20 mg Oral BID Lary Magdaleno NP   20 mg at 12/11/20 6997  lactulose (CHRONULAC) 10 gram/15 mL solution 15 mL  10 g Oral BID Priscilla Hartmann MD   15 mL at 12/11/20 9687  pregabalin (LYRICA) capsule 150 mg  150 mg Oral BID Jeffy Lucero MD   150 mg at 12/11/20 6150  ondansetron (ZOFRAN) injection 4 mg  4 mg IntraVENous Q4H PRN Ehsan Bermanhoff, NP   4 mg at 12/10/20 0502  ibuprofen (MOTRIN) tablet 400 mg  400 mg Oral Q4H PRN Ehsan Bermanhoff, NP   400 mg at 12/10/20 2217  phenazopyridine (PYRIDIUM) tablet 100 mg  100 mg Oral TID PRN Quincy Schilder, NP   100 mg at 12/11/20 0188  balsam peru-castor oiL (VENELEX) ointment   Topical TID Meryle Sluder, MD      
 epoetin annie-epbx (RETACRIT) injection 20,000 Units  20,000 Units SubCUTAneous Q7D Michelle Clay MD   20,000 Units at 12/10/20 2259  oxyCODONE IR (ROXICODONE) tablet 5 mg  5 mg Oral Q4H PRN Ehsan Gamez NP   5 mg at 12/01/20 6732  docusate sodium (COLACE) capsule 100 mg  100 mg Oral BID Delores Funes MD   100 mg at 12/11/20 0151  sodium chloride (NS) flush 5-40 mL  5-40 mL IntraVENous Q8H Delores Funes MD   10 mL at 12/11/20 7674  sodium chloride (NS) flush 5-40 mL  5-40 mL IntraVENous PRN Delores Funes MD      
 acetaminophen (TYLENOL) tablet 650 mg  650 mg Oral Q4H PRN Delores Funes MD   650 mg at 12/11/20 0901  [Held by provider] heparin (porcine) injection 5,000 Units  5,000 Units SubCUTAneous Q8H Delores Funes MD   5,000 Units at 12/06/20 1411 Objective:  
 
Patient Vitals for the past 24 hrs: 
 BP Temp Pulse Resp SpO2 Weight 12/11/20 0854 136/71 97.6 °F (36.4 °C) 74 16 97 %   
12/11/20 0420 (!) 151/72 97.9 °F (36.6 °C) 72 20 93 % 66.7 kg (147 lb 0.8 oz) 12/10/20 2357 (!) 149/79 97.5 °F (36.4 °C) 69 18 94 %   
12/10/20 2011 (!) 180/83 99 °F (37.2 °C) 99 22 95 %   
12/10/20 1706 (!) 161/69 98.6 °F (37 °C) 74 16 96 %   
12/10/20 1624 (!) 158/63 98.5 °F (36.9 °C) 78 16 96 %   
12/10/20 1501 (!) 156/77 98.1 °F (36.7 °C) 82 16 96 %   
12/10/20 1431 (!) 127/55 98 °F (36.7 °C) 96 16 94 %   
12/10/20 1414 (!) 151/71 98.2 °F (36.8 °C) 99 16 94 %   
12/10/20 1350 (!) 153/74 98.5 °F (36.9 °C) 77 16 95 %   
12/10/20 1156 (!) 155/75 99 °F (37.2 °C) 80 16 96 %  Gen: NAD HEENT: PERRL, Sclerae anicteric Pulm: aerating well bilat Abd: NABS, NTND, No HSM Ext: No c/c/e Available labs reviewed: 
Labs:   
Recent Results (from the past 24 hour(s)) RBC, ALLOCATE Collection Time: 12/10/20 10:00 AM  
Result Value Ref Range HISTORY CHECKED? Historical check performed TYPE & SCREEN Collection Time: 12/10/20 11:05 AM  
Result Value Ref Range Crossmatch Expiration 12/13/2020,2359 ABO/Rh(D) O POSITIVE Antibody screen NEG Unit number [id number] Blood component type  LR Unit division 00 Status of unit TRANSFUSED Crossmatch result Compatible CBC W/O DIFF  
 Collection Time: 12/11/20  5:39 AM  
Result Value Ref Range WBC 9.8 3.6 - 11.0 K/uL  
 RBC 2.58 (L) 3.80 - 5.20 M/uL HGB 8.5 (L) 11.5 - 16.0 g/dL HCT 26.7 (L) 35.0 - 47.0 % .5 (H) 80.0 - 99.0 FL  
 MCH 32.9 26.0 - 34.0 PG  
 MCHC 31.8 30.0 - 36.5 g/dL RDW 21.2 (H) 11.5 - 14.5 % PLATELET 391 532 - 000 K/uL MPV 10.7 8.9 - 12.9 FL  
 NRBC 0.5 (H) 0  WBC ABSOLUTE NRBC 0.05 (H) 0.00 - 0.01 K/uL Assessment and Plan  
 
71 y/o woman with MDS (MLD-RS, low risk) on outpatient procrit/luspatercept, now admitted with questionable UTI and confusion. 1. Confusion: seen by Neuro, MRI brain ordered and pending. CT normal. This appears to have resolved 2. MDS: wbc 9.8 k today,  grannix stopped on 12/9. . hgb 8.5 today, after one unit prbc's on 12/10. [place] continue procrit 3. Worsening pleural effusions. . pt had 600 ccs of yellow fluid tapped off 12/9. [place] cytology still pending 4. Bladder outlet obsruction:Dr. Glenda Cruz saw her and offered lithotripsy for non obstructing stones. . she is considering this 5. Abd. Pain/bloating. . this is a chronic concern of hers. .. I had offered w/u with CT in the office in October but she wanted [facility] urology to work this up. CT done this admit. Shows non obstructing renal calculi,.,, no ascites or sign of malignancy 6. Disposition. . [facility] D/w ,,,  
 
Willis Hale MD

## 2020-12-11 NOTE — PROGRESS NOTES
Physical Therapy New order acknowledged, chart reviewed. Pt currently on PT caseload with treatment frequency of 5x/ wk. Noted plan for possible d/c to [facility] today. Will continue to follow.  
 
Levi Horta, PT, MPT

## 2020-12-11 NOTE — DISCHARGE SUMMARY
Discharge Summary PATIENT ID: Vahid Wong MRN: [id number] YOB: 1945 DATE OF ADMISSION: 11/30/2020 11:09 AM   
DATE OF DISCHARGE: 12/11/2020 PRIMARY CARE PROVIDER: Reggie Modi MD  
 
ATTENDING PHYSICIAN: Yajaira Baig MD 
DISCHARGING PROVIDER: Yajaira Baig MD   
To contact this individual call [phone] and ask the  to page. If unavailable ask to be transferred the Adult Hospitalist Department. CONSULTATIONS: IP CONSULT TO HEMATOLOGY 
IP CONSULT TO ONCOLOGY 
IP CONSULT TO INFECTIOUS DISEASES 
IP CONSULT TO NEUROLOGY 
IP CONSULT TO UROLOGY PROCEDURES/SURGERIES: * No surgery found * [id number] COURSE:  
Evaluated and treated for PNA, resp failure, hx of MDS, required blood transfusions, evaluated by hematology, will f/u op. Risk of Re-Admission: mod DISCHARGE DIAGNOSES / PLAN:   
 
LLL PNA: neutropenic fever, bone marrow dysfunction that was attributed to Bactrim. Acute Hypoxic Respiratory Failure: Resolved, on room air 
-IVF's- SARS-CoV-2: negative-oxygen support, pulse ox monitoring 
-CT cap: Bilateral pleural effusions with overlying atelectasis. Coronary artery disease. Nonobstructed bilateral nephrolithiasis. -ID following, appreciated recommendation- po Kephlex for 7days. 
  
Acute metabolic encephalopathy: due to above. - resolved. -CT head: Indeterminate hyperdensity just above the sella turcica posteriorly. Contrast-enhanced MRI of the brain for further delineation recommended, findings consistent with mild chronic microvascular ischemic change, moderate to severe temporal predominant degree of cerebral atrophy. 
-Neuro following:-neurovascular checks-TSH: 1.64- B12: >2000. Op EMG/MRI brain-c-spine- fall precautions-aspiration precautions, -BCs -ve. 
  
Neutropenic Fever:  -monitor CBC-Heme-onc consult following.  
MDS: Appreciated recommendation from oncology- f/u op. Anemia: Chronic vs Acute, no signs of bleeding-monitor CBC- transfuse for HB<7. 
Chronic Leukopenia/ Neutropenia: following adverse reaction to sulfa drug. Chronic UTI's, Hx Nephrolithiasis and Bladder Obstruction: Urology evaluated. F/u op Generalized edema: Conjunctival edema, b/l LEs edema, UEs edema.   
- Echo preserved ef, start Lasix daily. monitor 
  
POA Left lower leg, abrasion from fall: 5 x 3 x 0.1 cm, 100% pink, no exudate, no odor. Periwound intact from erythema. Wound care following. 
-daily cleanse with saline; apply Xeroform and dry dressing. 
-Sacrum, buttocks and heels: Venelex TID. 
  
s/p US guided left thoracentesis, 12/09: 620 mL of fluid removed. Hypokalemia: Due to Lasix, hold Lasix, replacing. Monitor #. HyperNatremia and HyperChloremia: iatrogenic. Encourage water- resolving FOLLOW UP APPOINTMENTS:   
Follow-up Information Follow up With Specialties Details Why Contact Info Liz Roth MD Family Medicine   [address] [place] [id number] [phone] ADDITIONAL CARE RECOMMENDATIONS:  Follow up with PCP and hematology DIET: Resume previous diet ACTIVITY: Activity as tolerated DISCHARGE MEDICATIONS: 
Current Discharge Medication List  
  
START taking these medications Details  
cephALEXin (KEFLEX) 500 mg capsule Take 1 Cap by mouth every eight (8) hours for 5 days. Qty: 15 Cap, Refills: 0  
  
famotidine (PEPCID) 20 mg tablet Take 1 Tab by mouth two (2) times a day for 30 days. Qty: 60 Tab, Refills: 0  
  
furosemide (LASIX) 20 mg tablet Take 1 Tab by mouth daily for 7 days. Qty: 7 Tab, Refills: 0  
  
oxyCODONE IR (ROXICODONE) 5 mg immediate release tablet Take 1 Tab by mouth every four (4) hours as needed for Pain for up to 3 days. Max Daily Amount: 30 mg. 
Qty: 8 Tab, Refills: 0 Associated Diagnoses: SOB (shortness of breath) CONTINUE these medications which have NOT CHANGED Details potassium chloride SA (MICRO-K) 10 mEq capsule Take 20 mEq by mouth two (2) times a day. !! pregabalin (LYRICA) 75 mg capsule Take 75 mg by mouth two (2) times a day. Takes 75 mg BID and 150 mg QHS ! ! pregabalin (LYRICA) 150 mg capsule Take 150 mg by mouth nightly. Takes 75 mg BID and 150 mg QHS  
  
bethanechol (URECHOLINE) 10 mg tablet Take 10 mg by mouth Before breakfast, lunch, and dinner. zaleplon (SONATA) 5 mg capsule Take 5 mg by mouth nightly. docusate sodium (COLACE) 100 mg capsule Take 100 mg by mouth two (2) times a day. !! - Potential duplicate medications found. Please discuss with provider. STOP taking these medications  
  
 doxycycline (MONODOX) 50 mg capsule Comments:  
Reason for Stopping:   
   
 nitrofurantoin (MACROBID) 100 mg capsule Comments:  
Reason for Stopping:   
   
  
 
NOTIFY YOUR PHYSICIAN FOR ANY OF THE FOLLOWING:  
Fever over 101 degrees for 24 hours. Chest pain, shortness of breath, fever, chills, nausea, vomiting, diarrhea, change in mentation, falling, weakness, bleeding. Severe pain or pain not relieved by medications. Or, any other signs or symptoms that you may have questions about. DISPOSITION: 
  Home With: 
 OT  PT  HH  RN  
  
x Long term SNF/Inpatient Rehab Independent/assisted living Hospice Other:  
 
PATIENT CONDITION AT DISCHARGE:  
 
Functional status Poor Deconditioned Independent Cognition [facility] Forgetful Dementia Catheters/lines (plus indication) [place] PICC   
 PEG None Code status Full code DNR   
 
PHYSICAL EXAMINATION AT DISCHARGE: 
Patient seen and examined at bedside, Condition stable, explained discharge and follow up plans. /71 (BP 1 Location: Right arm, BP Patient Position: At rest)   Pulse 74   Temp 97.6 °F (36.4 °C)   Resp 16   Ht 5' 4\" (1.626 m)   Wt 66.7 kg (147 lb 0.8 oz)   SpO2 97%   BMI 25.24 kg/m² General:  Alert, oriented, No acute distress. Anxious. Looking forward to rehab Resp:  No accessory muscle use, Good AE. On 1L NC O Neuro:  Grossly normal, no focal neuro deficits, follows commands CHRONIC MEDICAL DIAGNOSES: 
Problem List as of 12/11/2020 Never Reviewed Codes Class Noted - Resolved Disorientation ICD-10-CM: R41.0 ICD-9-CM: 780.99  12/2/2020 - Present Chronic UTI (urinary tract infection) ICD-10-CM: N39.0 ICD-9-CM: 599.0  12/2/2020 - Present * (Principal) PNA (pneumonia) ICD-10-CM: J18.9 ICD-9-CM: 653  12/2/2020 - Present Acute metabolic encephalopathy UAO-86-ZO: G93.41 
ICD-9-CM: 348.31  12/2/2020 - Present MDS (myelodysplastic syndrome) (HCC) ICD-10-CM: D46.9 ICD-9-CM: 238.75  12/2/2020 - Present Neutropenic fever ([address] ICD-10-CM: D70.9, R50.81 ICD-9-CM: 288.00, 780.61  12/2/2020 - Present Chronic anemia ICD-10-CM: D64.9 ICD-9-CM: 285.9  12/2/2020 - Present Fall ICD-10-CM: W19. [place] Remedies ICD-9-CM: E888.9  12/2/2020 - Present Severe sepsis (HCC) ICD-10-CM: A41.9, R65.20 ICD-9-CM: 038.9, 995.92  12/2/2020 - Present SOB (shortness of breath) ICD-10-CM: R06.02 
ICD-9-CM: 786.05  11/30/2020 - Present 32 minutes were spent with the patient on counseling and coordination of care.  
 
Signed:  
Jacqueline Rangel MD 
12/11/2020 
10:05 AM

## 2020-12-11 NOTE — PROGRESS NOTES
Problem: Pressure Injury - Risk of 
Goal: *Prevention of pressure injury Description: Document Neeraj Scale and appropriate interventions in the flowsheet. Outcome: Progressing Towards Goal 
Note: Pressure Injury Interventions: 
Sensory Interventions: Assess changes in LOC, Keep linens dry and wrinkle-free, Float heels Moisture Interventions: Absorbent underpads, Apply protective barrier, creams and emollients, Check for incontinence Q2 hours and as needed, Internal/External urinary devices, Limit adult briefs, Maintain skin hydration (lotion/cream) Activity Interventions: Pressure redistribution bed/mattress(bed type) Mobility Interventions: Assess need for specialty bed Nutrition Interventions: Document food/fluid/supplement intake Friction and Shear Interventions: HOB 30 degrees or less

## 2020-12-11 NOTE — PROGRESS NOTES
TORSTEN: d/c to [facility] for inpatient rehab; Negative Covid test 12/9; BLS Transport via stretcher ; 2nd IMM letter provided on 12/8 as per record RUR: 11% Inpatient Rehab/ Hospital to Hospital Transition of Care Note /Discharge Note EMTALA filed and ready for MD to sign at bedside chart. Accepting Physician: Dr. Sharifa Causey Discharging Physician: Dr. Anabella Cervantes Accepting Representative: Holly Wiley [phone] Accepting Facility: [facility] Acute Rehab 
RN call to report: [phone] Transport: [facility] phone [phone]  time: 4pm 
 
Ambulance packet at bedside chart. Family notified: CM met pt and she is in agreement with the discharge plan. The attending physician and the primary nurse were notified of the plan. 0900-CM reviewed pt chart & discussed pt case with Charge Nurse. Plan for d/c to [facility] upon H/H stability. H/H noted to be 8.3/25.5 this am; CM spoke with Chelsi Brown of [facility] and confirmed they can accept pt today after 3pm; CM to confirm with Attending regarding medical stability for d/c today. 1030-CM confirmed with Attending that pt is medically ready for d/c today; d/c order in place; CM met with pt at bedside & discussed d/c plan to [facility] today & pt is agreeable to d/c plan. CM contacted Tony Peraza, liaison of [facility] and they can accept pt today; awaiting info on accepting MD & Room #. CM arranged stretcher transport with [facility] for 4pm; CM update RN with d/c plan. CM to send all d/c paperwork(AVS, Kardex, MAR & complete CM portion of Emtala). CM to follow for transitions of care.  
Raheem Nieves RN BSN [facility] 
CRM

## 2020-12-12 NOTE — PROGRESS NOTES
Reviewed discharge instructions with pt and . Gave SBAR report to Diamond Goldberg RN at [email or web address].

## 2020-12-13 LAB
BACTERIA SPEC CULT: NORMAL
GRAM STN SPEC: NORMAL
GRAM STN SPEC: NORMAL
SERVICE CMNT-IMP: NORMAL

## 2021-04-12 ENCOUNTER — OFFICE VISIT (OUTPATIENT)
Dept: NEUROLOGY | Age: 76
End: 2021-04-12
Payer: MEDICARE

## 2021-04-12 VITALS
WEIGHT: 122 LBS | SYSTOLIC BLOOD PRESSURE: 140 MMHG | HEART RATE: 103 BPM | BODY MASS INDEX: 21.62 KG/M2 | HEIGHT: 63 IN | DIASTOLIC BLOOD PRESSURE: 80 MMHG | OXYGEN SATURATION: 95 %

## 2021-04-12 DIAGNOSIS — R29.898 WEAKNESS OF BOTH ARMS: ICD-10-CM

## 2021-04-12 DIAGNOSIS — R25.2 SPASTICITY: ICD-10-CM

## 2021-04-12 DIAGNOSIS — R29.898 WEAKNESS OF BOTH LOWER EXTREMITIES: Primary | ICD-10-CM

## 2021-04-12 DIAGNOSIS — F40.240 CLAUSTROPHOBIA: ICD-10-CM

## 2021-04-12 PROCEDURE — 1090F PRES/ABSN URINE INCON ASSESS: CPT | Performed by: PSYCHIATRY & NEUROLOGY

## 2021-04-12 PROCEDURE — G8536 NO DOC ELDER MAL SCRN: HCPCS | Performed by: PSYCHIATRY & NEUROLOGY

## 2021-04-12 PROCEDURE — 1101F PT FALLS ASSESS-DOCD LE1/YR: CPT | Performed by: PSYCHIATRY & NEUROLOGY

## 2021-04-12 PROCEDURE — G8399 PT W/DXA RESULTS DOCUMENT: HCPCS | Performed by: PSYCHIATRY & NEUROLOGY

## 2021-04-12 PROCEDURE — 99214 OFFICE O/P EST MOD 30 MIN: CPT | Performed by: PSYCHIATRY & NEUROLOGY

## 2021-04-12 PROCEDURE — 3017F COLORECTAL CA SCREEN DOC REV: CPT | Performed by: PSYCHIATRY & NEUROLOGY

## 2021-04-12 PROCEDURE — G8427 DOCREV CUR MEDS BY ELIG CLIN: HCPCS | Performed by: PSYCHIATRY & NEUROLOGY

## 2021-04-12 PROCEDURE — G8420 CALC BMI NORM PARAMETERS: HCPCS | Performed by: PSYCHIATRY & NEUROLOGY

## 2021-04-12 PROCEDURE — G8432 DEP SCR NOT DOC, RNG: HCPCS | Performed by: PSYCHIATRY & NEUROLOGY

## 2021-04-12 RX ORDER — LORAZEPAM 1 MG/1
TABLET ORAL
Qty: 2 TAB | Refills: 0 | Status: SHIPPED | OUTPATIENT
Start: 2021-04-12 | End: 2021-05-20 | Stop reason: ALTCHOICE

## 2021-04-12 RX ORDER — TRAMADOL HYDROCHLORIDE 50 MG/1
50 TABLET ORAL
COMMUNITY

## 2021-04-12 NOTE — PROGRESS NOTES
Neurology Consult Note      HISTORY PROVIDED BY: patient and spouse    Chief Complaint:   Chief Complaint   Patient presents with    New Patient      Subjective:    Venkat Kamara is a 78 y.o. right handed female who was seen by Luis Amador NP while hospitalized 11/30/20 - 12/11/20 with UTI and PNA on background of MDS with chronic leukopenia/neutropenia, diagnosed in Sept, 2020 followed by Dr. Nate Erickson. Neurology was asked to see her while hospitalized for confusion and chronic upper and lower extremity weakness. She has h/o extensive spine surgery with fusion from T4 to ilium by [facility], last surgery was in June, 2019. [facility] and had NCS/EMG 7/23/20 with Dr. 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 She attributes sxs in hands to taking bactrim in March, 2020, only took it for two days, symptoms started in right hand then several weeks later, the left hand became involved. She also attributes leg sxs to debility from hospitalization. She has not had MRI of C-spine. She spent 6 weeks in rehab after discharge in December, then [place] therapy, had decreased in function, now in out pt PT. She feels her leg sxs have declined. Feels numbness in feet at times, mild, better than it was. B/b control is good.     Past Medical History:   Diagnosis Date    Chronic pain     Chronic pain disorder 1984-current     Degenerated intervertebral disc     Melanosis of colon     Nausea & vomiting     Psychiatric disorder     chronic Pain     PUD (peptic ulcer disease)       Past Surgical History:   Procedure Laterality Date    HX GYN      HX ORTHOPAEDIC      HX UROLOGICAL      NEUROLOGICAL PROCEDURE UNLISTED      IL ABDOMEN SURGERY PROC UNLISTED        Social History     Socioeconomic History    Marital status:      Spouse name: Not on file    Number of children: Not on file    Years of education: Not on file    Highest education level: Not on file   Occupational History    Not on file   Social Needs    Financial resource strain: Not on file    Food insecurity     Worry: Not on file     Inability: Not on file    Transportation needs     Medical: Not on file     Non-medical: Not on file   Tobacco Use    Smoking status: Never Smoker   Substance and Sexual Activity    Alcohol use: Yes     Comment: 3 glasses of wine per night.  Drug use: No    Sexual activity: Not on file   Lifestyle    Physical activity     Days per week: Not on file     Minutes per session: Not on file    Stress: Not on file   Relationships    Social connections     Talks on phone: Not on file     Gets together: Not on file     Attends Bahai service: Not on file     Active member of club or organization: Not on file     Attends meetings of clubs or organizations: Not on file     Relationship status: Not on file    Intimate partner violence     Fear of current or ex partner: Not on file     Emotionally abused: Not on file     Physically abused: Not on file     Forced sexual activity: Not on file   Other Topics Concern    Not on file   Social History Narrative    Not on file     No family history on file. Objective:   Review of Systems   Musculoskeletal: Positive for falls, joint pain and myalgias. Neurological: Positive for sensory change and weakness. All other systems reviewed and are negative. Allergies   Allergen Reactions    Fentanyl Anaphylaxis     Patch    Bactrim [Sulfamethoprim] Anxiety    Celebrex [Celecoxib] Other (comments)     11/30/2020:  As per [facility] pharmacist    Duloxetine Nausea Only     Other reaction(s): Nausea Only    Sulfa (Sulfonamide Antibiotics) Other (comments)     11/20/2020: per         Meds:  Outpatient Medications Prior to Visit   Medication Sig Dispense Refill    potassium chloride SA (MICRO-K) 10 mEq capsule Take 20 mEq by mouth two (2) times a day.  pregabalin (LYRICA) 75 mg capsule Take 75 mg by mouth two (2) times a day. Takes 75 mg BID and 150 mg QHS      bethanechol (URECHOLINE) 10 mg tablet Take 10 mg by mouth Before breakfast, lunch, and dinner.  zaleplon (SONATA) 5 mg capsule Take 5 mg by mouth nightly.  pregabalin (LYRICA) 150 mg capsule Take 150 mg by mouth nightly. Takes 75 mg BID and 150 mg QHS      docusate sodium (COLACE) 100 mg capsule Take 100 mg by mouth two (2) times a day. No facility-administered medications prior to visit. Imaging:  MRI Results (most recent):  Results from Hospital Encounter encounter on 09/16/11   MRI SPINE LUMBAR WITHOUT CONTRAST    Narrative **Final Report**       ICD Codes / Adm. Diagnosis: 722.73   / INTERVERTEBRAL LUMBAR DISC DIS    Examination:  MRI L SPINE WO CON  - [id number] - Sep 16 2011  1:50PM  Accession No:  [id number]  Reason:  722.73      REPORT:  This report has been moved from accession #[id number] which reflected the   incorrect ordering party for this exam.  The ordering party has been   corrected in the header of this report and is being presented to me Dr. Aure Montez today 12/01/11 for review and re-signature. INDICATION:  Low back pain with, bilateral buttocks pain, and right leg   radiculopathy    COMPARISON: None    TECHNIQUE:   MR imaging of the lumbar spine was performed with sagittal T1, T2, STIR;    axial T1, T2, the study is mildly compromised by patient motion    FINDINGS:  There is mild dextro-convex scoliosis, with the apex in the region of   L3-L4. . Vertebral body heights are maintained. There is mild heterogeneity   of the marrow and a few hemangiomata are noted. There are no suspicious   marrow lesions. . The conus medullaris terminates at L1. T12/L1:  No significant abnormalities    L1/2: Mild left central disc extrusion. No stenosis or foraminal narrowing.     L2/3:  Mild spondylosis and broad based posterior disc protrusion. Borderline central stenosis. Mild foraminal narrowing. L3/4: Moderately severe bilateral facet hypertrophy with grade 1   spondylolisthesis. Mild posterior disc bulge and spondylosis. Severe central   spinal stenosis. No significant foraminal narrowing. L4/5:  Severe bilateral facet hypertrophy with grade 1 to 2   spondylolisthesis. Mild posterior disc bulge. Severe central spinal   stenosis. Mild bilateral foraminal narrowing. L5/S1: Mild central and right paracentral disc extrusion with slight   indentation of the thecal sac and right S1 nerve root. Mild facet   hypertrophy. No stenosis. Bilateral foraminal narrowing, right greater than   left. Discs degeneration with overall loss of height of the disc and mild   spondylosis. IMPRESSION:    1. Degenerative spondylolisthesis at L3-L4 and L4-L5 with severe stenosis. 2. L5-S1 central and right disc extrusion. 3. Disc herniations at L1-L2 and L2-L3 without stenosis. Signing/Reading Doctor: Awa Carrasco ([id number])    Approved: Awa Carrasco ([id number])  12/05/2011                                     CT Results (most recent):  Results from [place] encounter on 11/30/20   CT CHEST WO CONT    Narrative INDICATION: Dyspnea, abnormal x-ray    COMPARISON: CT scan of 11/30/2020, chest radiograph 12/8/2020 and 11/30/2020    CONTRAST: None. TECHNIQUE:  5 mm axial images were obtained through the chest. Coronal and  sagittal reconstructions were generated. The absence of intravenous contrast  reduces the sensitivity for evaluation of the mediastinum and upper abdominal  organs. CT dose reduction was achieved through use of a standardized protocol  tailored for this examination and automatic exposure control for dose  modulation. FINDINGS:  The patient has posterior fusion of the thoracic spine with interpedicular  screws and bilateral rods.  This causes significant streak artifact making  evaluation of the density of the fluid and other structures difficult to  accurately measure. THYROID: No nodule. MEDIASTINUM: No mass or lymphadenopathy. Small calcified lymph nodes are  unchanged  RENITA: No mass or lymphadenopathy. THORACIC AORTA: No aneurysm. MAIN PULMONARY ARTERY: Normal in caliber. TRACHEA/BRONCHI: Patent. ESOPHAGUS: No wall thickening or dilatation. HEART: Normal in size. There is moderate to severe coronary artery calcification  PLEURA: There is now present a large left pleural effusion this has increased in  the interval. Left pleural effusion occupies approximately one half of the left  hemithorax. There is a moderate right pleural effusion which has increased  significantly in the interval and extends from the lung base to the lung apex  posteriorly. .  LUNGS:     The lungs are abnormal. The left lower lobe is completely collapsed and airless  surrounded by the pleural fluid. The left upper lobe demonstrates no pneumonia. There is mild linear scarring or atelectasis inferiorly in the lingula. The right lower lobe is completely collapsed and airless. There are calcified  granuloma in the right lower lobe. Right upper lobe reveals minimal atelectasis  posteriorly. Right middle lobe is clear. The aerated lung reveals no pneumonia. There is no significant pulmonary edema. . There are calcified granulomas in the  right upper lobe anteriorly and laterally. INCIDENTALLY IMAGED UPPER ABDOMEN: There is new ascites in the upper abdomen. There is a well-defined 4.7 cm probable cyst off the upper pole right kidney the  density measurement is difficult due to the streak artifact. There is also  hypodensity in the upper pole of the left kidney that is difficult to accurately  measure but probably represents a cyst. Ultrasound exam would yield more  information to evaluate the kidneys. Partially imaged are nonobstructing  bilateral renal stones. . There is a small cyst  in the liver.      BONES: Bones are difficult to evaluate due to the streak artifact from the  metallic hardware. The compression fracture of T12 is again noted. The rounded  lucency in the L1 vertebral body with sclerotic margin is again noted. .    There is interval development of body wall edema most likely due to third  spacing of fluid. Impression IMPRESSION:    1. Markedly increased and left pleural effusion which now occupies approximately  one half of the left hemithorax. The left pleural effusion causes complete  collapse of left lower lobe. 2. Increased right pleural effusion which causes complete collapse of the right  lower lobe. 3. No pneumonia or pulmonary edema. 4. There is new ascites in the upper abdomen and there is body wall edema. The  body wall edema suggesting third spacing of fluid. 5. Bilateral renal lesions not well characterized due to the artifact from the  metallic hardware in the spine. These are likely cysts. Ultrasound may yield  more information. Reviewed records in [facility] [employer] tab today    Lab Review   Results for orders placed or performed during the hospital encounter of 11/30/20   CULTURE, BLOOD, PAIRED    Specimen: Blood   Result Value Ref Range    Special Requests: NO SPECIAL REQUESTS      Culture result: NO GROWTH 5 DAYS     URINE CULTURE HOLD SAMPLE    Specimen: Serum   Result Value Ref Range    Urine culture hold        Urine on hold in Microbiology dept for 2 days. If unpreserved urine is submitted, it cannot be used for addtional testing after 24 hours, recollection will be required. URINE CULTURE HOLD SAMPLE    Specimen: Serum; Urine   Result Value Ref Range    Urine culture hold        Urine on hold in Microbiology dept for 2 days. If unpreserved urine is submitted, it cannot be used for addtional testing after 24 hours, recollection will be required.    CULTURE, MRSA    Specimen: Nares; Nasal   Result Value Ref Range    Special Requests: NO SPECIAL REQUESTS      Culture result: MRSA NOT PRESENT      Culture result:            Screening of patient nares for MRSA is for surveillance purposes and, if positive, to facilitate isolation considerations in high risk settings. It is not intended for automatic decolonization interventions per se as regimens are not sufficiently effective to warrant routine use.    CULTURE, URINE    Specimen: Clean catch; Urine   Result Value Ref Range    Special Requests: NO SPECIAL REQUESTS      Clio Count >100,000  COLONIES/mL        Culture result: ESCHERICHIA COLI (A)      Culture result: 2ND STRAIN OF ESCHERICHIA COLI (A)         Susceptibility    Escherichia coli Second Strain - JUSTYN     Amikacin ($) <=2 Susceptible ug/mL     Ampicillin ($) 4 Susceptible ug/mL     Ampicillin/sulbactam ($) <=2 Susceptible ug/mL     Cefazolin ($) <=4 Susceptible ug/mL     Cefepime ($$) <=1 Susceptible ug/mL     Cefoxitin 32 Resistant ug/mL     Ceftazidime ($) <=1 Susceptible ug/mL     Ceftriaxone ($) <=1 Susceptible ug/mL     Ciprofloxacin ($) <=0.25 Susceptible ug/mL     Gentamicin ($) 8 Intermediate ug/mL     Levofloxacin ($) <=0.12 Susceptible ug/mL     Meropenem ($$) <=0.25 Susceptible ug/mL     Nitrofurantoin >=512 Resistant ug/mL     Piperacillin/Tazobac ($) <=4 Susceptible ug/mL     Tobramycin ($) <=1 Susceptible ug/mL     Trimeth/Sulfa >=320 Resistant ug/mL    Escherichia coli - JUSTYN     Amikacin ($) 8 Susceptible ug/mL     Ampicillin ($) >=32 Resistant ug/mL     Ampicillin/sulbactam ($) >=32 Resistant ug/mL     Cefazolin ($) <=4 Susceptible ug/mL     Cefepime ($$) <=1 Susceptible ug/mL     Cefoxitin 8 Susceptible ug/mL     Ceftazidime ($) <=1 Susceptible ug/mL     Ceftriaxone ($) <=1 Susceptible ug/mL     Ciprofloxacin ($) <=0.25 Susceptible ug/mL     Gentamicin ($) >=16 Resistant ug/mL     Levofloxacin ($) <=0.12 Susceptible ug/mL     Meropenem ($$) <=0.25 Susceptible ug/mL     Nitrofurantoin >=512 Resistant ug/mL     Piperacillin/Tazobac ($) 8 Susceptible ug/mL     Tobramycin ($) 8 Intermediate ug/mL     Trimeth/Sulfa >=320 Resistant ug/mL   CULTURE, BODY FLUID W GRAM STAIN    Specimen: Pleural Fluid; Body Fluid   Result Value Ref Range    Special Requests: NO SPECIAL REQUESTS      GRAM STAIN 1+ WBCS SEEN      GRAM STAIN WBCS SEEN      Culture result: NO GROWTH 4 DAYS     CBC WITH AUTOMATED DIFF   Result Value Ref Range    WBC 1.2 (L) 3.6 - 11.0 K/uL    RBC 2.49 (L) 3.80 - 5.20 M/uL    HGB 8.5 (L) 11.5 - 16.0 g/dL    HCT 27.1 (L) 35.0 - 47.0 %    .8 (H) 80.0 - 99.0 FL    MCH 34.1 (H) 26.0 - 34.0 PG    MCHC 31.4 30.0 - 36.5 g/dL    RDW 23.2 (H) 11.5 - 14.5 %    PLATELET 531 545 - 190 K/uL    MPV 11.3 8.9 - 12.9 FL    NRBC 1.7 (H) 0  WBC    ABSOLUTE NRBC 0.02 (H) 0.00 - 0.01 K/uL    NEUTROPHILS 14 (L) 32 - 75 %    BAND NEUTROPHILS 4 0 - 6 %    LYMPHOCYTES 31 12 - 49 %    MONOCYTES 46 (H) 5 - 13 %    EOSINOPHILS 2 0 - 7 %    BASOPHILS 3 (H) 0 - 1 %    IMMATURE GRANULOCYTES 0 %    ABS. NEUTROPHILS 0.2 (L) 1.8 - 8.0 K/UL    ABS. LYMPHOCYTES 0.4 (L) 0.8 - 3.5 K/UL    ABS. MONOCYTES 0.6 0.0 - 1.0 K/UL    ABS. EOSINOPHILS 0.0 0.0 - 0.4 K/UL    ABS. BASOPHILS 0.0 0.0 - 0.1 K/UL    ABS. IMM. GRANS. 0.0 K/UL    DF MANUAL      RBC COMMENTS ANISOCYTOSIS  2+        RBC COMMENTS MACROCYTOSIS  1+        RBC COMMENTS OVALOCYTES  PRESENT        RBC COMMENTS SCHISTOCYTES  PRESENT        RBC COMMENTS Pathology Review Requested      WBC COMMENTS Differential performed on buffy coat smear. Pathologist review        Pathologic examination results can be viewed in [facility] Chart Review under the Pathology tab.    METABOLIC PANEL, COMPREHENSIVE   Result Value Ref Range    Sodium 142 136 - 145 mmol/L    Potassium 4.1 3.5 - 5.1 mmol/L    Chloride 110 (H) 97 - 108 mmol/L    CO2 30 21 - 32 mmol/L    Anion gap 2 (L) 5 - 15 mmol/L    Glucose 112 (H) 65 - 100 mg/dL    BUN 21 (H) 6 - 20 MG/DL    Creatinine 0.49 (L) 0.55 - 1.02 MG/DL    BUN/Creatinine ratio 43 (H) 12 - 20      GFR est AA >60 >60 ml/min/1.73m2    GFR est non-AA >60 >60 ml/min/1.73m2    Calcium 8.6 8.5 - 10.1 MG/DL    Bilirubin, total 0.5 0.2 - 1.0 MG/DL    ALT (SGPT) 21 12 - 78 U/L    AST (SGOT) 18 15 - 37 U/L    Alk. phosphatase 77 45 - 117 U/L    Protein, total 5.1 (L) 6.4 - 8.2 g/dL    Albumin 2.3 (L) 3.5 - 5.0 g/dL    Globulin 2.8 2.0 - 4.0 g/dL    A-G Ratio 0.8 (L) 1.1 - 2.2     SAMPLES BEING HELD   Result Value Ref Range    SAMPLES BEING HELD 1blu,1red,1UC     COMMENT        Add-on orders for these samples will be processed based on acceptable specimen integrity and analyte stability, which may vary by analyte.    LACTIC ACID   Result Value Ref Range    Lactic acid 1.4 0.4 - 2.0 MMOL/L   SARS-COV-2   Result Value Ref Range    Specimen source Nasopharyngeal      SARS-CoV-2 Not detected NOTD      Specimen source Nasopharyngeal      Specimen type NP Swab      Health status Symptomatic Testing     URINALYSIS W/MICROSCOPIC   Result Value Ref Range    Color YELLOW/STRAW      Appearance CLEAR CLEAR      Specific gravity 1.015 1.003 - 1.030      pH (UA) 5.5 5.0 - 8.0      Protein 300 (A) NEG mg/dL    Glucose Negative NEG mg/dL    Ketone Negative NEG mg/dL    Bilirubin Negative NEG      Blood TRACE (A) NEG      Urobilinogen 0.2 0.2 - 1.0 EU/dL    Nitrites Negative NEG      Leukocyte Esterase Negative NEG      WBC 0-4 0 - 4 /hpf    RBC 0-5 0 - 5 /hpf    Epithelial cells FEW FEW /lpf    Bacteria 4+ (A) NEG /hpf   PROCALCITONIN   Result Value Ref Range    Procalcitonin 0.11 ng/mL   TROPONIN I   Result Value Ref Range    Troponin-I, Qt. <0.05 <0.05 ng/mL   POC EG7   Result Value Ref Range    Calcium, ionized (POC) 1.24 1.12 - 1.32 mmol/L    pH (POC) 7.43 7.35 - 7.45      pCO2 (POC) 46.6 (H) 35.0 - 45.0 MMHG    pO2 (POC) 83 80 - 100 MMHG    HCO3 (POC) 30.9 (H) 22 - 26 MMOL/L    Base excess (POC) 7 mmol/L    sO2 (POC) 96 92 - 97 %    Site RIGHT RADIAL      Device: NASAL CANNULA      Flow rate (POC) 1 L/M    Jose David test (POC) YES      Specimen type (POC) ARTERIAL     TROPONIN I   Result Value Ref Range    Troponin-I, Qt. <0.05 <5.03 ng/mL   METABOLIC PANEL, COMPREHENSIVE   Result Value Ref Range    Sodium 147 (H) 136 - 145 mmol/L    Potassium 3.6 3.5 - 5.1 mmol/L    Chloride 112 (H) 97 - 108 mmol/L    CO2 26 21 - 32 mmol/L    Anion gap 9 5 - 15 mmol/L    Glucose 104 (H) 65 - 100 mg/dL    BUN 18 6 - 20 MG/DL    Creatinine 0.38 (L) 0.55 - 1.02 MG/DL    BUN/Creatinine ratio 47 (H) 12 - 20      GFR est AA >60 >60 ml/min/1.73m2    GFR est non-AA >60 >60 ml/min/1.73m2    Calcium 8.2 (L) 8.5 - 10.1 MG/DL    Bilirubin, total 0.5 0.2 - 1.0 MG/DL    ALT (SGPT) 14 12 - 78 U/L    AST (SGOT) 8 (L) 15 - 37 U/L    Alk. phosphatase 62 45 - 117 U/L    Protein, total 4.2 (L) 6.4 - 8.2 g/dL    Albumin 1.9 (L) 3.5 - 5.0 g/dL    Globulin 2.3 2.0 - 4.0 g/dL    A-G Ratio 0.8 (L) 1.1 - 2.2     CBC WITH AUTOMATED DIFF   Result Value Ref Range    WBC 1.5 (L) 3.6 - 11.0 K/uL    RBC 2.15 (L) 3.80 - 5.20 M/uL    HGB 7.2 (L) 11.5 - 16.0 g/dL    HCT 23.9 (L) 35.0 - 47.0 %    .2 (H) 80.0 - 99.0 FL    MCH 33.5 26.0 - 34.0 PG    MCHC 30.1 30.0 - 36.5 g/dL    RDW 23.0 (H) 11.5 - 14.5 %    PLATELET 976 842 - 088 K/uL    MPV 10.3 8.9 - 12.9 FL    NRBC 1.3 (H) 0  WBC    ABSOLUTE NRBC 0.02 (H) 0.00 - 0.01 K/uL    NEUTROPHILS 20 (L) 32 - 75 %    LYMPHOCYTES 33 12 - 49 %    MONOCYTES 42 (H) 5 - 13 %    EOSINOPHILS 0 0 - 7 %    BASOPHILS 5 (H) 0 - 1 %    IMMATURE GRANULOCYTES 0 %    ABS. NEUTROPHILS 0.3 (L) 1.8 - 8.0 K/UL    ABS. LYMPHOCYTES 0.5 (L) 0.8 - 3.5 K/UL    ABS. MONOCYTES 0.6 0.0 - 1.0 K/UL    ABS. EOSINOPHILS 0.0 0.0 - 0.4 K/UL    ABS. BASOPHILS 0.1 0.0 - 0.1 K/UL    ABS. IMM.  GRANS. 0.0 K/UL    DF MANUAL      RBC COMMENTS OVALOCYTES  PRESENT        RBC COMMENTS ANISOCYTOSIS  2+        RBC COMMENTS MACROCYTOSIS  2+       SAMPLES BEING HELD   Result Value Ref Range    SAMPLES BEING HELD 1BLU,1RED     COMMENT        Add-on orders for these samples will be processed based on acceptable specimen integrity and analyte stability, which may vary by analyte. SED RATE (ESR)   Result Value Ref Range    Sed rate, automated 61 (H) 0 - 30 mm/hr   PROTEIN ELECTROPHORESIS   Result Value Ref Range    Protein, total 4.3 (L) 6.0 - 8.5 g/dL    Albumin 2.1 (L) 2.9 - 4.4 g/dL    Alpha-1-globulin 0.4 0.0 - 0.4 g/dL    ALPHA-2 GLOBULIN 0.3 (L) 0.4 - 1.0 g/dL    Beta globulin 1.3 0.7 - 1.3 g/dL    Gamma globulin 0.3 (L) 0.4 - 1.8 g/dL    M-Tom Not Observed Not Observed g/dL    Globulin, total 2.2 2.2 - 3.9 g/dL    A/G ratio 1.0 0.7 - 1.7     VITAMIN B12   Result Value Ref Range    Vitamin B12 >2,000 (H) 193 - 986 pg/mL   TSH 3RD GENERATION   Result Value Ref Range    TSH 1.64 0.36 - 3.74 uIU/mL   BERTHA, DIRECT, W/REFLEX   Result Value Ref Range    BERTHA, Direct Negative Negative     CBC WITH AUTOMATED DIFF   Result Value Ref Range    WBC 1.3 (L) 3.6 - 11.0 K/uL    RBC 2.46 (L) 3.80 - 5.20 M/uL    HGB 8.5 (L) 11.5 - 16.0 g/dL    HCT 27.2 (L) 35.0 - 47.0 %    .6 (H) 80.0 - 99.0 FL    MCH 34.6 (H) 26.0 - 34.0 PG    MCHC 31.3 30.0 - 36.5 g/dL    RDW 22.6 (H) 11.5 - 14.5 %    PLATELET 463 903 - 691 K/uL    MPV 10.8 8.9 - 12.9 FL    NRBC 1.6 (H) 0  WBC    ABSOLUTE NRBC 0.02 (H) 0.00 - 0.01 K/uL    NEUTROPHILS 14 (L) 32 - 75 %    BAND NEUTROPHILS 2 0 - 6 %    LYMPHOCYTES 39 12 - 49 %    MONOCYTES 43 (H) 5 - 13 %    EOSINOPHILS 0 0 - 7 %    BASOPHILS 2 (H) 0 - 1 %    IMMATURE GRANULOCYTES 0 %    ABS. NEUTROPHILS 0.2 (L) 1.8 - 8.0 K/UL    ABS. LYMPHOCYTES 0.5 (L) 0.8 - 3.5 K/UL    ABS. MONOCYTES 0.6 0.0 - 1.0 K/UL    ABS. EOSINOPHILS 0.0 0.0 - 0.4 K/UL    ABS. BASOPHILS 0.0 0.0 - 0.1 K/UL    ABS. IMM.  GRANS. 0.0 K/UL    DF MANUAL      RBC COMMENTS ANISOCYTOSIS  2+        RBC COMMENTS MACROCYTOSIS  1+        WBC COMMENTS ATYPICAL LYMPHOCYTES PRESENT     METABOLIC PANEL, COMPREHENSIVE   Result Value Ref Range    Sodium 147 (H) 136 - 145 mmol/L    Potassium 3.2 (L) 3.5 - 5.1 mmol/L    Chloride 113 (H) 97 - 108 mmol/L    CO2 25 21 - 32 mmol/L    Anion gap 9 5 - 15 mmol/L    Glucose 94 65 - 100 mg/dL    BUN 21 (H) 6 - 20 MG/DL    Creatinine 0.43 (L) 0.55 - 1.02 MG/DL    BUN/Creatinine ratio 49 (H) 12 - 20      GFR est AA >60 >60 ml/min/1.73m2    GFR est non-AA >60 >60 ml/min/1.73m2    Calcium 8.7 8.5 - 10.1 MG/DL    Bilirubin, total 0.4 0.2 - 1.0 MG/DL    ALT (SGPT) 12 12 - 78 U/L    AST (SGOT) 8 (L) 15 - 37 U/L    Alk. phosphatase 62 45 - 117 U/L    Protein, total 4.8 (L) 6.4 - 8.2 g/dL    Albumin 1.8 (L) 3.5 - 5.0 g/dL    Globulin 3.0 2.0 - 4.0 g/dL    A-G Ratio 0.6 (L) 1.1 - 2.2     CBC WITH AUTOMATED DIFF   Result Value Ref Range    WBC  K/uL     SPECIMEN INTEGRITY QUESTIONABLE. SUGGEST RECOLLECTION    RBC  M/uL     SPECIMEN INTEGRITY QUESTIONABLE. SUGGEST RECOLLECTION    HGB  12.0 - 16.0 g/dL     SPECIMEN INTEGRITY QUESTIONABLE. SUGGEST RECOLLECTION    HCT  %     SPECIMEN INTEGRITY QUESTIONABLE. SUGGEST RECOLLECTION    MCV  78.0 - 102.0 FL     SPECIMEN INTEGRITY QUESTIONABLE. SUGGEST RECOLLECTION    MCH  25.0 - 35.0 PG     SPECIMEN INTEGRITY QUESTIONABLE. SUGGEST RECOLLECTION    MCHC  31.0 - 37.0 g/dL     SPECIMEN INTEGRITY QUESTIONABLE. SUGGEST RECOLLECTION    RDW  11.5 - 14.5 %     SPECIMEN INTEGRITY QUESTIONABLE. SUGGEST RECOLLECTION    PLATELET  K/uL     SPECIMEN INTEGRITY QUESTIONABLE. SUGGEST RECOLLECTION    MPV  7.4 - 10.4 FL     SPECIMEN INTEGRITY QUESTIONABLE. SUGGEST RECOLLECTION    NRBC  0  WBC     SPECIMEN INTEGRITY QUESTIONABLE. SUGGEST RECOLLECTION    ABSOLUTE NRBC  K/uL     SPECIMEN INTEGRITY QUESTIONABLE. SUGGEST RECOLLECTION    NEUTROPHILS  42 - 75 %     SPECIMEN INTEGRITY QUESTIONABLE. SUGGEST RECOLLECTION    LYMPHOCYTES  12 - 49 %     SPECIMEN INTEGRITY QUESTIONABLE. SUGGEST RECOLLECTION    MONOCYTES  5 - 13 %     SPECIMEN INTEGRITY QUESTIONABLE. SUGGEST RECOLLECTION    EOSINOPHILS  0 - 5 %     SPECIMEN INTEGRITY QUESTIONABLE.  SUGGEST RECOLLECTION BASOPHILS  0 - 1 %     SPECIMEN INTEGRITY QUESTIONABLE. SUGGEST RECOLLECTION    IMMATURE GRANULOCYTES  0.0 - 5.0 %     SPECIMEN INTEGRITY QUESTIONABLE. SUGGEST RECOLLECTION    ABS. NEUTROPHILS  K/UL     SPECIMEN INTEGRITY QUESTIONABLE. SUGGEST RECOLLECTION    ABS. LYMPHOCYTES  K/UL     SPECIMEN INTEGRITY QUESTIONABLE. SUGGEST RECOLLECTION    ABS. MONOCYTES  K/UL     SPECIMEN INTEGRITY QUESTIONABLE. SUGGEST RECOLLECTION    ABS. EOSINOPHILS  K/UL     SPECIMEN INTEGRITY QUESTIONABLE. SUGGEST RECOLLECTION    ABS. BASOPHILS  K/UL     SPECIMEN INTEGRITY QUESTIONABLE. SUGGEST RECOLLECTION    ABS. IMM. GRANS.  0.0 - 0.5 K/UL     SPECIMEN INTEGRITY QUESTIONABLE. SUGGEST RECOLLECTION    DF        SPECIMEN INTEGRITY QUESTIONABLE. SUGGEST RECOLLECTION   METABOLIC PANEL, COMPREHENSIVE   Result Value Ref Range    Sodium 147 (H) 136 - 145 mmol/L    Potassium 4.2 3.5 - 5.1 mmol/L    Chloride 119 (H) 97 - 108 mmol/L    CO2 21 21 - 32 mmol/L    Anion gap 7 5 - 15 mmol/L    Glucose 83 65 - 100 mg/dL    BUN 24 (H) 6 - 20 MG/DL    Creatinine 0.48 (L) 0.55 - 1.02 MG/DL    BUN/Creatinine ratio 50 (H) 12 - 20      GFR est AA >60 >60 ml/min/1.73m2    GFR est non-AA >60 >60 ml/min/1.73m2    Calcium 8.3 (L) 8.5 - 10.1 MG/DL    Bilirubin, total 0.4 0.2 - 1.0 MG/DL    ALT (SGPT) 12 12 - 78 U/L    AST (SGOT) 10 (L) 15 - 37 U/L    Alk.  phosphatase 58 45 - 117 U/L    Protein, total 4.5 (L) 6.4 - 8.2 g/dL    Albumin 1.6 (L) 3.5 - 5.0 g/dL    Globulin 2.9 2.0 - 4.0 g/dL    A-G Ratio 0.6 (L) 1.1 - 2.2     CBC WITH AUTOMATED DIFF   Result Value Ref Range    WBC 1.2 (L) 3.6 - 11.0 K/uL    RBC 2.48 (L) 3.80 - 5.20 M/uL    HGB 8.5 (L) 11.5 - 16.0 g/dL    HCT 27.4 (L) 35.0 - 47.0 %    .5 (H) 80.0 - 99.0 FL    MCH 34.3 (H) 26.0 - 34.0 PG    MCHC 31.0 30.0 - 36.5 g/dL    RDW 22.8 (H) 11.5 - 14.5 %    PLATELET 556 775 - 250 K/uL    MPV 11.0 8.9 - 12.9 FL    NRBC 1.7 (H) 0  WBC    ABSOLUTE NRBC 0.02 (H) 0.00 - 0.01 K/uL    NEUTROPHILS 15 (L) 32 - 75 %    LYMPHOCYTES 41 12 - 49 %    MONOCYTES 35 (H) 5 - 13 %    EOSINOPHILS 6 0 - 7 %    BASOPHILS 3 (H) 0 - 1 %    IMMATURE GRANULOCYTES 0 0.0 - 0.5 %    ABS. NEUTROPHILS 0.2 (L) 1.8 - 8.0 K/UL    ABS. LYMPHOCYTES 0.5 (L) 0.8 - 3.5 K/UL    ABS. MONOCYTES 0.4 0.0 - 1.0 K/UL    ABS. EOSINOPHILS 0.1 0.0 - 0.4 K/UL    ABS. BASOPHILS 0.0 0.0 - 0.1 K/UL    ABS. IMM. GRANS. 0.0 0.00 - 0.04 K/UL    DF SMEAR SCANNED      RBC COMMENTS ANISOCYTOSIS  1+       SAMPLES BEING HELD   Result Value Ref Range    SAMPLES BEING HELD  1 TALL PST     COMMENT        Add-on orders for these samples will be processed based on acceptable specimen integrity and analyte stability, which may vary by analyte. HEMOGLOBIN A1C WITH EAG   Result Value Ref Range    Hemoglobin A1c 4.7 4.0 - 5.6 %    Est. average glucose 88 mg/dL   CBC W/O DIFF   Result Value Ref Range    WBC 1.1 (L) 3.6 - 11.0 K/uL    RBC 2.58 (L) 3.80 - 5.20 M/uL    HGB 8.8 (L) 11.5 - 16.0 g/dL    HCT 28.6 (L) 35.0 - 47.0 %    .9 (H) 80.0 - 99.0 FL    MCH 34.1 (H) 26.0 - 34.0 PG    MCHC 30.8 30.0 - 36.5 g/dL    RDW 23.3 (H) 11.5 - 14.5 %    PLATELET 561 390 - 950 K/uL    MPV 10.9 8.9 - 12.9 FL    NRBC 1.9 (H) 0  WBC    ABSOLUTE NRBC 0.02 (H) 0.00 - 2.12 K/uL   METABOLIC PANEL, COMPREHENSIVE   Result Value Ref Range    Sodium 146 (H) 136 - 145 mmol/L    Potassium 4.0 3.5 - 5.1 mmol/L    Chloride 118 (H) 97 - 108 mmol/L    CO2 20 (L) 21 - 32 mmol/L    Anion gap 8 5 - 15 mmol/L    Glucose 110 (H) 65 - 100 mg/dL    BUN 26 (H) 6 - 20 MG/DL    Creatinine 0.55 0.55 - 1.02 MG/DL    BUN/Creatinine ratio 47 (H) 12 - 20      GFR est AA >60 >60 ml/min/1.73m2    GFR est non-AA >60 >60 ml/min/1.73m2    Calcium 8.6 8.5 - 10.1 MG/DL    Bilirubin, total 0.3 0.2 - 1.0 MG/DL    ALT (SGPT) 13 12 - 78 U/L    AST (SGOT) 7 (L) 15 - 37 U/L    Alk.  phosphatase 64 45 - 117 U/L    Protein, total 4.7 (L) 6.4 - 8.2 g/dL    Albumin 1.8 (L) 3.5 - 5.0 g/dL    Globulin 2.9 2.0 - 4.0 g/dL    A-G Ratio 0.6 (L) 1.1 - 2.2     CBC WITH AUTOMATED DIFF   Result Value Ref Range    WBC PLEASE DISREGARD RESULTS 3.6 - 11.0 K/uL    RBC PLEASE DISREGARD RESULTS 3.80 - 5.20 M/uL    HGB PLEASE DISREGARD RESULTS 11.5 - 16.0 g/dL    HCT PLEASE DISREGARD RESULTS 35.0 - 47.0 %    MCV Cannot be calculated 80.0 - 99.0 FL    MCH Cannot be calculated 26.0 - 34.0 PG    MCHC Cannot be calculated 30.0 - 36.5 g/dL    RDW PLEASE DISREGARD RESULTS 11.5 - 14.5 %    PLATELET PLEASE DISREGARD RESULTS 150 - 400 K/uL    MPV PLEASE DISREGARD RESULTS 8.9 - 12.9 FL    NRBC PLEASE DISREGARD RESULTS 0  WBC    ABSOLUTE NRBC PLEASE DISREGARD RESULTS 0.00 - 0.01 K/uL    NEUTROPHILS PLEASE DISREGARD RESULTS 32 - 75 %    LYMPHOCYTES PLEASE DISREGARD RESULTS 12 - 49 %    MONOCYTES PLEASE DISREGARD RESULTS 5 - 13 %    EOSINOPHILS PLEASE DISREGARD RESULTS 0 - 7 %    BASOPHILS PLEASE DISREGARD RESULTS 0 - 1 %    IMMATURE GRANULOCYTES PLEASE DISREGARD RESULTS 0.0 - 0.5 %    ABS. NEUTROPHILS PLEASE DISREGARD RESULTS 1.8 - 8.0 K/UL    ABS. LYMPHOCYTES Not performed 0.8 - 3.5 K/UL    ABS. MONOCYTES Not performed 0.0 - 1.0 K/UL    ABS. EOSINOPHILS Not performed 0.0 - 0.4 K/UL    ABS. BASOPHILS Not performed 0.0 - 0.1 K/UL    ABS. IMM. GRANS. PLEASE DISREGARD RESULTS 0.00 - 0.04 K/UL    DF PLEASE DISREGARD RESULTS     CBC WITH AUTOMATED DIFF   Result Value Ref Range    WBC 3.0 (L) 3.6 - 11.0 K/uL    RBC 2.47 (L) 3.80 - 5.20 M/uL    HGB 8.4 (L) 11.5 - 16.0 g/dL    HCT 27.3 (L) 35.0 - 47.0 %    .5 (H) 80.0 - 99.0 FL    MCH 34.0 26.0 - 34.0 PG    MCHC 30.8 30.0 - 36.5 g/dL    RDW 22.5 (H) 11.5 - 14.5 %    PLATELET 633 754 - 046 K/uL    MPV 10.8 8.9 - 12.9 FL    NRBC 1.4 (H) 0  WBC    ABSOLUTE NRBC 0.04 (H) 0.00 - 0.01 K/uL    NEUTROPHILS 45 32 - 75 %    BAND NEUTROPHILS 9 (H) 0 - 6 %    LYMPHOCYTES 22 12 - 49 %    MONOCYTES 21 (H) 5 - 13 %    EOSINOPHILS 1 0 - 7 %    BASOPHILS 2 (H) 0 - 1 %    IMMATURE GRANULOCYTES 0 %    ABS.  NEUTROPHILS 1.6 (L) 1.8 - 8.0 K/UL    ABS. LYMPHOCYTES 0.7 (L) 0.8 - 3.5 K/UL    ABS. MONOCYTES 0.6 0.0 - 1.0 K/UL    ABS. EOSINOPHILS 0.0 0.0 - 0.4 K/UL    ABS. BASOPHILS 0.1 0.0 - 0.1 K/UL    ABS. IMM. GRANS. 0.0 K/UL    DF MANUAL      RBC COMMENTS MACROCYTOSIS  2+        RBC COMMENTS ANISOCYTOSIS  2+        RBC COMMENTS OVALOCYTES  PRESENT        RBC COMMENTS HYPOCHROMIA  1+        WBC COMMENTS VACUOLATED POLYS     METABOLIC PANEL, COMPREHENSIVE   Result Value Ref Range    Sodium 148 (H) 136 - 145 mmol/L    Potassium 4.0 3.5 - 5.1 mmol/L    Chloride 118 (H) 97 - 108 mmol/L    CO2 22 21 - 32 mmol/L    Anion gap 8 5 - 15 mmol/L    Glucose 89 65 - 100 mg/dL    BUN 28 (H) 6 - 20 MG/DL    Creatinine 0.55 0.55 - 1.02 MG/DL    BUN/Creatinine ratio 51 (H) 12 - 20      GFR est AA >60 >60 ml/min/1.73m2    GFR est non-AA >60 >60 ml/min/1.73m2    Calcium 8.5 8.5 - 10.1 MG/DL    Bilirubin, total 0.3 0.2 - 1.0 MG/DL    ALT (SGPT) 11 (L) 12 - 78 U/L    AST (SGOT) 8 (L) 15 - 37 U/L    Alk. phosphatase 65 45 - 117 U/L    Protein, total 4.1 (L) 6.4 - 8.2 g/dL    Albumin 1.8 (L) 3.5 - 5.0 g/dL    Globulin 2.3 2.0 - 4.0 g/dL    A-G Ratio 0.8 (L) 1.1 - 2.2     CBC WITH AUTOMATED DIFF   Result Value Ref Range    WBC 4.6 3.6 - 11.0 K/uL    RBC 2.32 (L) 3.80 - 5.20 M/uL    HGB 7.8 (L) 11.5 - 16.0 g/dL    HCT 25.8 (L) 35.0 - 47.0 %    .2 (H) 80.0 - 99.0 FL    MCH 33.6 26.0 - 34.0 PG    MCHC 30.2 30.0 - 36.5 g/dL    RDW 22.6 (H) 11.5 - 14.5 %    PLATELET 650 266 - 558 K/uL    MPV 11.0 8.9 - 12.9 FL    NRBC 1.1 (H) 0  WBC    ABSOLUTE NRBC 0.05 (H) 0.00 - 0.01 K/uL    NEUTROPHILS 56 32 - 75 %    BAND NEUTROPHILS 6 0 - 6 %    LYMPHOCYTES 10 (L) 12 - 49 %    MONOCYTES 25 (H) 5 - 13 %    EOSINOPHILS 2 0 - 7 %    BASOPHILS 1 0 - 1 %    IMMATURE GRANULOCYTES 0 %    ABS. NEUTROPHILS 2.8 1.8 - 8.0 K/UL    ABS. LYMPHOCYTES 0.5 (L) 0.8 - 3.5 K/UL    ABS. MONOCYTES 1.2 (H) 0.0 - 1.0 K/UL    ABS. EOSINOPHILS 0.1 0.0 - 0.4 K/UL    ABS.  BASOPHILS 0.0 0.0 - 0.1 K/UL    ABS. IMM. GRANS. 0.0 K/UL    DF MANUAL      PLATELET COMMENTS Large Platelets      RBC COMMENTS ANISOCYTOSIS  2+        RBC COMMENTS MACROCYTOSIS  2+        RBC COMMENTS OVALOCYTES  1+        RBC COMMENTS SCHISTOCYTES  PRESENT       METABOLIC PANEL, BASIC   Result Value Ref Range    Sodium 146 (H) 136 - 145 mmol/L    Potassium 3.4 (L) 3.5 - 5.1 mmol/L    Chloride 117 (H) 97 - 108 mmol/L    CO2 23 21 - 32 mmol/L    Anion gap 6 5 - 15 mmol/L    Glucose 103 (H) 65 - 100 mg/dL    BUN 28 (H) 6 - 20 MG/DL    Creatinine 0.53 (L) 0.55 - 1.02 MG/DL    BUN/Creatinine ratio 53 (H) 12 - 20      GFR est AA >60 >60 ml/min/1.73m2    GFR est non-AA >60 >60 ml/min/1.73m2    Calcium 8.7 8.5 - 10.1 MG/DL   CBC WITH AUTOMATED DIFF   Result Value Ref Range    WBC 15.8 (H) 3.6 - 11.0 K/uL    RBC 2.37 (L) 3.80 - 5.20 M/uL    HGB 8.3 (L) 11.5 - 16.0 g/dL    HCT 25.5 (L) 35.0 - 47.0 %    .6 (H) 80.0 - 99.0 FL    MCH 35.0 (H) 26.0 - 34.0 PG    MCHC 32.5 30.0 - 36.5 g/dL    RDW 22.1 (H) 11.5 - 14.5 %    PLATELET 033 593 - 411 K/uL    MPV 10.9 8.9 - 12.9 FL    NRBC 0.4 (H) 0  WBC    ABSOLUTE NRBC 0.07 (H) 0.00 - 0.01 K/uL    NEUTROPHILS 65 32 - 75 %    BAND NEUTROPHILS 11 (H) 0 - 6 %    LYMPHOCYTES 6 (L) 12 - 49 %    MONOCYTES 16 (H) 5 - 13 %    EOSINOPHILS 2 0 - 7 %    BASOPHILS 0 0 - 1 %    IMMATURE GRANULOCYTES 0 %    ABS. NEUTROPHILS 12.1 (H) 1.8 - 8.0 K/UL    ABS. LYMPHOCYTES 0.9 0.8 - 3.5 K/UL    ABS. MONOCYTES 2.5 (H) 0.0 - 1.0 K/UL    ABS. EOSINOPHILS 0.3 0.0 - 0.4 K/UL    ABS. BASOPHILS 0.0 0.0 - 0.1 K/UL    ABS. IMM. GRANS. 0.0 K/UL    DF MANUAL      RBC COMMENTS ANISOCYTOSIS  2+        RBC COMMENTS MACROCYTOSIS  1+        RBC COMMENTS OVALOCYTES  1+       AMYLASE, FLUID   Result Value Ref Range    Fluid Type: PLEURAL FLUID      Amylase, body fld.  12 U/L   CELL COUNT, BODY FLUID   Result Value Ref Range    BODY FLUID TYPE PLEURAL FLUID      FLUID COLOR YELLOW      FLUID APPEARANCE SLIGHTLY HAZY FLUID RBC CT. 51 (H) 0 /cu mm    FLUID NUCLEATED CELLS 250 /cu mm    FLD NEUTROPHILS 11 (A) NRRE %    FLD LYMPHS 53 (A) NRRE %    FLD MONO/MACROPHAGES 34 (A) NRRE %    FLUID MESOTHELIAL 2 (A) NRRE %   GLUCOSE, FLUID   Result Value Ref Range    Fluid Type: PLEURAL FLUID      Glucose, body fld. 112 MG/DL   LDH, BODY FLUID   Result Value Ref Range    Fluid Type: PLEURAL FLUID      LD, body fld. 65 U/L   PH, FLUID   Result Value Ref Range    FLUID TYPE(15) PLEURAL FLUID      FLUID PH 7.0     PROTEIN TOTAL, FLUID   Result Value Ref Range    Fluid Type: PLEURAL FLUID      Protein total, body fld. 1.5 g/dL   TRIGLYCERIDES, FLUID   Result Value Ref Range    Fluid Type: PLEURAL FLUID      Triglyceride, body fld. <31 MG/DL   METABOLIC PANEL, BASIC   Result Value Ref Range    Sodium 146 (H) 136 - 145 mmol/L    Potassium 2.8 (L) 3.5 - 5.1 mmol/L    Chloride 113 (H) 97 - 108 mmol/L    CO2 28 21 - 32 mmol/L    Anion gap 5 5 - 15 mmol/L    Glucose 98 65 - 100 mg/dL    BUN 22 (H) 6 - 20 MG/DL    Creatinine 0.51 (L) 0.55 - 1.02 MG/DL    BUN/Creatinine ratio 43 (H) 12 - 20      GFR est AA >60 >60 ml/min/1.73m2    GFR est non-AA >60 >60 ml/min/1.73m2    Calcium 8.8 8.5 - 10.1 MG/DL   CBC WITH AUTOMATED DIFF   Result Value Ref Range    WBC 11.6 (H) 3.6 - 11.0 K/uL    RBC 2.30 (L) 3.80 - 5.20 M/uL    HGB 7.8 (L) 11.5 - 16.0 g/dL    HCT 24.2 (L) 35.0 - 47.0 %    .2 (H) 80.0 - 99.0 FL    MCH 33.9 26.0 - 34.0 PG    MCHC 32.2 30.0 - 36.5 g/dL    RDW 21.8 (H) 11.5 - 14.5 %    PLATELET 619 949 - 929 K/uL    MPV 10.9 8.9 - 12.9 FL    NRBC 0.7 (H) 0  WBC    ABSOLUTE NRBC 0.08 (H) 0.00 - 0.01 K/uL    NEUTROPHILS 73 32 - 75 %    BAND NEUTROPHILS 6 0 - 6 %    LYMPHOCYTES 6 (L) 12 - 49 %    MONOCYTES 10 5 - 13 %    EOSINOPHILS 3 0 - 7 %    BASOPHILS 0 0 - 1 %    METAMYELOCYTES 2 (H) 0 %    IMMATURE GRANULOCYTES 0 %    ABS. NEUTROPHILS 9.2 (H) 1.8 - 8.0 K/UL    ABS. LYMPHOCYTES 0.7 (L) 0.8 - 3.5 K/UL    ABS.  MONOCYTES 1.2 (H) 0.0 - 1.0 K/UL    ABS. EOSINOPHILS 0.3 0.0 - 0.4 K/UL    ABS. BASOPHILS 0.0 0.0 - 0.1 K/UL    ABS. IMM.  GRANS. 0.0 K/UL    DF MANUAL      RBC COMMENTS ANISOCYTOSIS  2+        WBC COMMENTS TOXIC GRANULATION     SARS-COV-2   Result Value Ref Range    Specimen source Nasopharyngeal      SARS-CoV-2 Not detected NOTD      Specimen source NP SWAB     Specimen type NP Swab      Health status Symptomatic Testing     CBC W/O DIFF   Result Value Ref Range    WBC 9.0 3.6 - 11.0 K/uL    RBC 2.05 (L) 3.80 - 5.20 M/uL    HGB 6.9 (L) 11.5 - 16.0 g/dL    HCT 21.9 (L) 35.0 - 47.0 %    .8 (H) 80.0 - 99.0 FL    MCH 33.7 26.0 - 34.0 PG    MCHC 31.5 30.0 - 36.5 g/dL    RDW 22.4 (H) 11.5 - 14.5 %    PLATELET 567 570 - 797 K/uL    MPV 10.6 8.9 - 12.9 FL    NRBC 0.6 (H) 0  WBC    ABSOLUTE NRBC 0.05 (H) 0.00 - 3.73 K/uL   METABOLIC PANEL, BASIC   Result Value Ref Range    Sodium 148 (H) 136 - 145 mmol/L    Potassium 3.4 (L) 3.5 - 5.1 mmol/L    Chloride 114 (H) 97 - 108 mmol/L    CO2 28 21 - 32 mmol/L    Anion gap 6 5 - 15 mmol/L    Glucose 100 65 - 100 mg/dL    BUN 23 (H) 6 - 20 MG/DL    Creatinine 0.60 0.55 - 1.02 MG/DL    BUN/Creatinine ratio 38 (H) 12 - 20      GFR est AA >60 >60 ml/min/1.73m2    GFR est non-AA >60 >60 ml/min/1.73m2    Calcium 8.7 8.5 - 10.1 MG/DL   CBC W/O DIFF   Result Value Ref Range    WBC 9.8 3.6 - 11.0 K/uL    RBC 2.58 (L) 3.80 - 5.20 M/uL    HGB 8.5 (L) 11.5 - 16.0 g/dL    HCT 26.7 (L) 35.0 - 47.0 %    .5 (H) 80.0 - 99.0 FL    MCH 32.9 26.0 - 34.0 PG    MCHC 31.8 30.0 - 36.5 g/dL    RDW 21.2 (H) 11.5 - 14.5 %    PLATELET 172 829 - 437 K/uL    MPV 10.7 8.9 - 12.9 FL    NRBC 0.5 (H) 0  WBC    ABSOLUTE NRBC 0.05 (H) 0.00 - 0.01 K/uL   EKG, 12 LEAD, INITIAL   Result Value Ref Range    Ventricular Rate 102 BPM    Atrial Rate 102 BPM    P-R Interval 170 ms    QRS Duration 82 ms    Q-T Interval 316 ms    QTC Calculation (Bezet) 411 ms    Calculated P Axis 45 degrees    Calculated R Axis 23 degrees Calculated T Axis 47 degrees    Diagnosis       Sinus tachycardia  No previous ECGs available  Confirmed by Natasha Booker MD, Yayaethan Raoch ([id number]) on 11/30/2020 5:52:33 PM     TYPE & SCREEN   Result Value Ref Range    Crossmatch Expiration 12/13/2020,2359     ABO/Rh(D) Ro [place] POSITIVE     Antibody screen NEG     Unit number [id number]     Blood component type RC LR     Unit division 00     Status of unit TRANSFUSED     Crossmatch result Compatible    RBC, ALLOCATE   Result Value Ref Range    HISTORY CHECKED? Historical check performed    ECHO ADULT COMPLETE   Result Value Ref Range    IVSd 1.05 (A) 0.60 - 0.90 cm    LVIDd 3.39 (A) 3.90 - 5.30 cm    LVIDs 2.03 cm    LVPWd 0.91 (A) 0.60 - 0.90 cm    LVOT Peak Gradient 4.68 mmHg    LVOT Peak Velocity 108.14 cm/s    AoV PG 4.95 mmHg    Aortic Valve Systolic Peak Velocity 324.37 cm/s    MV A Khanh 89.60 cm/s    Mitral Valve E Wave Deceleration Time 112.48 ms    MV E Khnah 48.10 cm/s    Mitral Valve Pressure Half-time 32.62 ms    MVA (PHT) 6.74 cm2    Pulmonic Valve Systolic Peak Instantaneous Gradient 4.92 mmHg    Tapse 1.87 1.50 - 2.00 cm    MV E/A 0.54     LV Mass AL 95.6 67.0 - 162.0 g    LV Mass AL Index 54.6 43.0 - 95.0 g/m2        Exam:  Visit Vitals  BP (!) 140/80 Comment: retaking 140/80   Pulse (!) 103   Ht 5' 3\" (1.6 m)   Wt 122 lb (55.3 kg)   SpO2 95%   BMI 21.61 kg/m²     General:  Alert, cooperative, no distress. Head:  Normocephalic, without obvious abnormality, atraumatic. Respiratory:  Heart:   Non labored breathing  Regular rate and rhythm, no murmurs   Neck:   2+ carotids, no bruits   Extremities: Warm, no cyanosis or edema. Pulses: 2+ radial pulses. Neurologic:  MS: Alert and oriented x 4, speech hoarse, attributes to pollen. Language intact. Attention and fund of knowledge appropriate. Recent and remote memory intact.   Cranial Nerves:  II: visual fields    II: pupils    II: optic disc    III,VII: ptosis none   III,IV,VI: extraocular muscles  EOMI, no nystagmus or diplopia   V: facial light touch sensation     VII: facial muscle function   symmetric   VIII: hearing intact   IX: soft palate elevation  normal   XI: trapezius strength  5/5   XI: sternocleidomastoid strength 5/5   XII: tongue  Midline     Motor: Diffuse decreased bulk in all extremities. Strength: 4/5 deltoids, right bicep 4/5, tricep 3/5, WF/WE 0/5, FF 4/5, FE 0/5, interossei 0/5, 4+/5 HF, LE 4+/5, LF 4+/5, DF 0/5, PF 2/5, Foot inversion and eversion 0/5. Lower extremity spasticity. Flaccid distal UE. No tremors seen. Sensory: Dec to PP in LE compared to arms, pt noted vibratory sensation long after it has stopped so result not accurate. Coordination: Unable to assess due to weakness  Gait: non-ambulatory  Reflexes: 2+ symmetric in UE, absent in LE, toes downgoing           Assessment/Plan   Pt is a 78 y.o. right handed female hospitalized 11/30/20 - 12/11/20 with UTI and PNA on background of MDS with chronic leukopenia/neutropenia, diagnosed in Sept, 2020 followed by Dr. Sushila Aguilar, with acute metabolic encephalopathy in the hospital, and chronic upper and lower extremity weakness. She has h/o extensive spine surgery with fusion from T4 to ilium by [facility], last surgery was in June, 2019. [facility] was [facility], [facility] and had NCS/EMG 7/23/20 with Dr. 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 Symptoms started in right hand then several weeks later, the left hand became involved, she also had worsening of LE weakness. No significant numbness or b/b changes reported. Exam with hoarse voice, diffuse atrophy of extremities, distal>proximal asymmetric weakness that is equal in upper vs lower extremities, with diminished LE reflexes, but spasticity in LE. This is a complex neuromuscular case.  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 Recommend starting work up with MRI C-spine and repeat NCS/EMG with a neuromuscular specialist. Consider Anit-Hu, GM1, and [id number] Ab testing pending results. Will request most recent office notes from Dr. Fermin Mcadams and PCP. F/u in clinic after testing is completed. ICD-10-CM ICD-9-CM    1. Weakness of both lower extremities  R29.898 729.89 EMG NCV MOTOR WITH F/WAVE PER NERVE      MRI CERV SPINE WO CONT   2. Weakness of both arms  R29.898 729.89 EMG NCV MOTOR WITH F/WAVE PER NERVE      MRI CERV SPINE WO CONT   3. Spasticity  R25.2 781.0 MRI CERV SPINE WO CONT   4. Claustrophobia  F40.240 300.29 LORazepam (ATIVAN) 1 mg tablet       Signed:   Alexia Kinney MD  4/12/2021

## 2021-04-12 NOTE — LETTER
4/13/2021 Patient: Messi Young YOB: 1945 Date of Visit: 4/12/2021 Daniel Bran MD 
[address] [place] [phone] Via Fax: [phone] Dear Daniel Bran MD, Thank you for referring Ms. Manny Desai to [address] [place] [address] for evaluation. My notes for this consultation are attached. If you have questions, please do not hesitate to call me. I look forward to following your patient along with you. Sincerely, Mary Reynolds MD

## 2021-04-13 ENCOUNTER — TELEPHONE (OUTPATIENT)
Dept: NEUROLOGY | Age: 76
End: 2021-04-13

## 2021-04-14 ENCOUNTER — TELEPHONE (OUTPATIENT)
Dept: NEUROLOGY | Age: 76
End: 2021-04-14

## 2021-04-14 NOTE — TELEPHONE ENCOUNTER
Tired to call and leave message to call [phone] to schedule MRI but voice mail is full.       ----- Message from [employer] sent at 4/14/2021  4:12 PM EDT -----  Regarding: /Telephone     Caller's first and last name:Paddy ()  Reason for call:schedule needed for mri and emg  Callback required yes/no and why:Yes  Best contact number(s):[phone]  Details to clarify the request:n/a

## 2021-04-28 ENCOUNTER — OFFICE VISIT (OUTPATIENT)
Dept: NEUROLOGY | Age: 76
End: 2021-04-28
Payer: MEDICARE

## 2021-04-28 VITALS
OXYGEN SATURATION: 98 % | BODY MASS INDEX: 21.62 KG/M2 | WEIGHT: 122 LBS | RESPIRATION RATE: 16 BRPM | DIASTOLIC BLOOD PRESSURE: 80 MMHG | SYSTOLIC BLOOD PRESSURE: 140 MMHG | HEIGHT: 63 IN | HEART RATE: 78 BPM

## 2021-04-28 DIAGNOSIS — G62.9 POLYNEUROPATHY: Primary | ICD-10-CM

## 2021-04-28 PROCEDURE — 95886 MUSC TEST DONE W/N TEST COMP: CPT | Performed by: PSYCHIATRY & NEUROLOGY

## 2021-04-28 PROCEDURE — 95913 NRV CNDJ TEST 13/> STUDIES: CPT | Performed by: PSYCHIATRY & NEUROLOGY

## 2021-04-29 ENCOUNTER — DOCUMENTATION ONLY (OUTPATIENT)
Dept: NEUROLOGY | Age: 76
End: 2021-04-29

## 2021-04-29 NOTE — PROGRESS NOTES
Received notes from abilio Viveros office visit 2/18/2021  Hospital follow-up secondary to left lower lobe pneumonia, noted to have altered mental status confusion, fever and hypoxia (Covid negative). Patient had neutropenia anemia hypernatremia. MRI brain with chronic ischemic changes and atrophy. Patient has myelodysplasia followed by Dr. Giovanni Thakur. Patient has persistent UTIs likely secondary to kidney stones followed by Dr. Rosa Cook in urology. Mentions osteoarthritis, degenerative joint disease, spinal stenosis of the cervical and lumbar spine status post surgery and 2012 and 2019 with current symptoms including lower extremity weakness, disequilibrium, incoordination and decreased sensation. Patient would like to see neurology and referral was placed. She has left hip pain improved with therapy and massage. Also has left plantar fasciitis improved with cortisone injections and orthotics with by Dr. Remberto Neal. Radial radiculopathy symptoms improved after PT followed by [facility] Dr. Anthony Castro. Patient is being treated with gabapentin, Suboxone, bethanochol, and Voltaren gel. Note from Dr. Giovanni Thakur date of service 3/18/2021  Follow-up for myelodysplasia. Mentions she had retrograde retrieval of a kidney stone on the left 2 weeks prior. Patient has low-grade MDS with excess ring sideroblasts and normal chromosomes. Notes sent to scanning.

## 2021-05-12 ENCOUNTER — OFFICE VISIT (OUTPATIENT)
Dept: NEUROLOGY | Age: 76
End: 2021-05-12
Payer: MEDICARE

## 2021-05-12 VITALS
BODY MASS INDEX: 20.09 KG/M2 | DIASTOLIC BLOOD PRESSURE: 90 MMHG | WEIGHT: 125 LBS | HEIGHT: 66 IN | SYSTOLIC BLOOD PRESSURE: 150 MMHG

## 2021-05-12 DIAGNOSIS — M54.12 CERVICAL RADICULOPATHY: ICD-10-CM

## 2021-05-12 DIAGNOSIS — G62.9 POLYNEUROPATHY: Primary | ICD-10-CM

## 2021-05-12 PROCEDURE — 1090F PRES/ABSN URINE INCON ASSESS: CPT | Performed by: PSYCHIATRY & NEUROLOGY

## 2021-05-12 PROCEDURE — G8427 DOCREV CUR MEDS BY ELIG CLIN: HCPCS | Performed by: PSYCHIATRY & NEUROLOGY

## 2021-05-12 PROCEDURE — G8420 CALC BMI NORM PARAMETERS: HCPCS | Performed by: PSYCHIATRY & NEUROLOGY

## 2021-05-12 PROCEDURE — 99214 OFFICE O/P EST MOD 30 MIN: CPT | Performed by: PSYCHIATRY & NEUROLOGY

## 2021-05-12 PROCEDURE — 1101F PT FALLS ASSESS-DOCD LE1/YR: CPT | Performed by: PSYCHIATRY & NEUROLOGY

## 2021-05-12 PROCEDURE — G8399 PT W/DXA RESULTS DOCUMENT: HCPCS | Performed by: PSYCHIATRY & NEUROLOGY

## 2021-05-12 PROCEDURE — G8536 NO DOC ELDER MAL SCRN: HCPCS | Performed by: PSYCHIATRY & NEUROLOGY

## 2021-05-12 PROCEDURE — G8432 DEP SCR NOT DOC, RNG: HCPCS | Performed by: PSYCHIATRY & NEUROLOGY

## 2021-05-12 PROCEDURE — 3017F COLORECTAL CA SCREEN DOC REV: CPT | Performed by: PSYCHIATRY & NEUROLOGY

## 2021-05-12 RX ORDER — BUTYROSPERMUM PARKII(SHEA BUTTER), SIMMONDSIA CHINENSIS (JOJOBA) SEED OIL, ALOE BARBADENSIS LEAF EXTRACT .01; 1; 3.5 G/100G; G/100G; G/100G
LIQUID TOPICAL
COMMUNITY

## 2021-05-12 NOTE — PROGRESS NOTES
Neurology Consult Note HISTORY PROVIDED BY: patient and spouse Chief Complaint:  
Chief Complaint Patient presents with  Follow-up  
  weakness of both lower extremities and both arms, poloneuropathy Subjective:  
Pt is a 78 y.o. right handed female initially and last seen in the clinic on 4/12/21, was hospitalized 11/30/20 - 12/11/20 with UTI and PNA on background of MDS with chronic leukopenia/neutropenia, diagnosed in Sept, 2020 followed by Dr. Bautista Adame, with acute metabolic encephalopathy in the hospital, and chronic upper and lower extremity weakness. She has h/o extensive spine surgery with fusion from T4 to ilium by [facility], last surgery was in June, 2019. [facility] was [facility], [facility] and had NCS/EMG 7/23/20 with Dr. 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 Symptoms started in right hand then several weeks later, the left hand became involved, she also had worsening of LE weakness. No significant numbness or b/b changes reported. Exam with hoarse voice, diffuse atrophy of extremities, distal>proximal asymmetric weakness that is equal in upper vs lower extremities, with diminished LE reflexes, but spasticity in LE. This is a complex neuromuscular case. 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 Recommended starting work up with MRI C-spine and repeat NCS/EMG with a neuromuscular specialist. Consider Anit-Hu, GM1, and [id number] Ab testing pending results. Will request most recent office notes from Dr. Bautista Adame and PCP. She returns for f/u. NCS/EMG 4/28/21 with Dr. Chantell Davis revealed a severe axonal peripheral polyneuropathy.  Active on chronic motor axon loss in right C5-6 innervated myotomes with evidence of reinnervation suggestive of a possible right C5-6 motor radiculopathy. No evidence of a right lumbosacral motor radiculopathy. She has not had MRI C-spine yet. No new complaints. Previous labs in 12/2020 included HgbA1C, BERTHA, B12, TSH - all unremarkable. ESR was elevated at 61 likely due to infection.   
  
Previous Testing/Reports: 
-Received notes from welcome MD Dr. Glen Lesch office visit 2/18/2021 Hospital follow-up secondary to left lower lobe pneumonia, noted to have altered mental status confusion, fever and hypoxia (Covid negative). Patient had neutropenia anemia hypernatremia. MRI brain with chronic ischemic changes and atrophy. Patient has myelodysplasia followed by Dr. Fareed Ta. Patient has persistent UTIs likely secondary to kidney stones followed by Dr. Reynaldo Calderon in urology. Mentions osteoarthritis, degenerative joint disease, spinal stenosis of the cervical and lumbar spine status post surgery and 2012 and 2019 with current symptoms including lower extremity weakness, disequilibrium, incoordination and decreased sensation. Patient would like to see neurology and referral was placed. She has left hip pain improved with therapy and massage. Also has left plantar fasciitis improved with cortisone injections and orthotics with by Dr. Stephanie Hannon. Radial radiculopathy symptoms improved after PT followed by [facility] [place] Dr. Ramírez Ernandez. Patient is being treated with gabapentin, Suboxone, bethanochol, and Voltaren gel. 
-Note from Dr. Fareed Ta date of service 3/18/2021 Follow-up for myelodysplasia. Mentions she had retrograde retrieval of a kidney stone on the left 2 weeks prior. Patient has low-grade MDS with excess ring sideroblasts and normal chromosomes. Past Medical History:  
Diagnosis Date  Chronic pain  Chronic pain disorder 1984-current  Degenerated intervertebral disc  Melanosis of colon  Nausea & vomiting  Psychiatric disorder   
 chronic Pain  PUD (peptic ulcer disease) Past Surgical History:  
Procedure Laterality Date  HX GYN    
 HX ORTHOPAEDIC    
 HX UROLOGICAL    
 NEUROLOGICAL PROCEDURE UNLISTED  IA ABDOMEN SURGERY PROC UNLISTED Social History Socioeconomic History  Marital status:  Spouse name: Not on file  Number of children: Not on file  Years of education: Not on file  Highest education level: Not on file Occupational History  Occupation: Business/Finance, president of [employer]. Tobacco Use  Smoking status: Never Smoker  Smokeless tobacco: Never Used Substance and Sexual Activity  Alcohol use: Yes Comment: 3 glasses of wine per night.  Drug use: No  
 Sexual activity: Not on file Other Topics Concern  Not on file Social History Narrative , lives in [facility], and [facility] Social Determinants of Health Financial Resource Strain:  Difficulty of Paying Living Expenses:   
Food Insecurity:  Worried About [address] in the Last Year:   
[address] in the Last Year:   
Transportation Needs:   
 Lack of Transportation (Medical):  Lack of Transportation (Non-Medical): Physical Activity:   
 Days of Exercise per Week:  Minutes of Exercise per Session:   
Stress:  Feeling of Stress :   
Social Connections:  Frequency of Communication with Friends and Family:  Frequency of Social Gatherings with Friends and Family:  Attends Scientologist Services:  Active Member of Clubs or Organizations:  Attends Club or Organization Meetings:  Marital Status: Intimate Partner Violence:  Fear of Current or Ex-Partner:  Emotionally Abused:   
 Physically Abused:   
 Sexually Abused: No family history on file. Objective:  
Review of Systems Musculoskeletal: Positive for falls, joint pain and myalgias. Neurological: Positive for sensory change and weakness.   
All other systems reviewed and are negative. Allergies Allergen Reactions  Fentanyl Anaphylaxis Patch  Bactrim [Sulfamethoprim] Anxiety  Celebrex [Celecoxib] Other (comments)  
  11/30/2020: As per Florina Crawofrd pharmacist  
 Duloxetine Nausea Only Other reaction(s): Nausea Only  Sulfa (Sulfonamide Antibiotics) Other (comments)  
  11/20/2020: per  Meds: Outpatient Medications Prior to Visit Medication Sig Dispense Refill  magnesium citrate 100 mg cap magnesium oxide 400 mg (241.3 mg magnesium) tablet TK 1 T PO  BID  traMADoL (ULTRAM) 50 mg tablet Take 50 mg by mouth every six (6) hours as needed for Pain.  potassium chloride SA (MICRO-K) 10 mEq capsule Take 20 mEq by mouth two (2) times a day.  pregabalin (LYRICA) 75 mg capsule Take 75 mg by mouth two (2) times a day. Takes 75 mg BID and 150 mg QHS  bethanechol (URECHOLINE) 10 mg tablet Take 10 mg by mouth Before breakfast, lunch, and dinner.  zaleplon (SONATA) 5 mg capsule Take 5 mg by mouth nightly.  LORazepam (ATIVAN) 1 mg tablet Take 1 tab on arrival to MRI, may repeat x 1 after 30 minutes if needed for anxiety 2 Tab 0  
 docusate sodium (COLACE) 100 mg capsule Take 100 mg by mouth two (2) times a day. No facility-administered medications prior to visit. Imaging: MRI Results (most recent): 
Results from Hospital Encounter encounter on 09/16/11 MRI SPINE LUMBAR WITHOUT CONTRAST Narrative **Final Report** 
  
 
ICD Codes / Adm. Diagnosis: 722.73   / INTERVERTEBRAL LUMBAR DISC DIS Examination:  MRI L SPINE  CON  - [id number] - Sep 16 2011  1:50PM 
Accession No:  [id number] Reason:  722.73 
 
 
REPORT: 
This report has been moved from accession #[id number] which reflected the  
incorrect ordering party for this exam.  The ordering party has been  
corrected in the header of this report and is being presented to me Dr. Kelly Diamond today 12/01/11 for review and re-signature.   
 
INDICATION:  Low back pain with, bilateral buttocks pain, and right leg  
radiculopathy COMPARISON: None TECHNIQUE:  
MR imaging of the lumbar spine was performed with sagittal T1, T2, STIR;   
axial T1, T2, the study is mildly compromised by patient motion FINDINGS: 
There is mild dextro-convex scoliosis, with the apex in the region of  
L3-L4. . Vertebral body heights are maintained. There is mild heterogeneity  
of the marrow and a few hemangiomata are noted. There are no suspicious  
marrow lesions. . The conus medullaris terminates at L1. T12/L1:  No significant abnormalities L1/2: Mild left central disc extrusion. No stenosis or foraminal narrowing. L2/3:  Mild spondylosis and broad based posterior disc protrusion. Borderline central stenosis. Mild foraminal narrowing. L3/4: Moderately severe bilateral facet hypertrophy with grade 1  
spondylolisthesis. Mild posterior disc bulge and spondylosis. Severe central  
spinal stenosis. No significant foraminal narrowing. L4/5:  Severe bilateral facet hypertrophy with grade 1 to 2  
spondylolisthesis. Mild posterior disc bulge. Severe central spinal  
stenosis. Mild bilateral foraminal narrowing. L5/S1: Mild central and right paracentral disc extrusion with slight  
indentation of the thecal sac and right S1 nerve root. Mild facet  
hypertrophy. No stenosis. Bilateral foraminal narrowing, right greater than  
left. Discs degeneration with overall loss of height of the disc and mild  
spondylosis. IMPRESSION:   
1. Degenerative spondylolisthesis at L3-L4 and L4-L5 with severe stenosis. 2. L5-S1 central and right disc extrusion. 3. Disc herniations at L1-L2 and L2-L3 without stenosis. Signing/Reading Doctor: Na Brush ([id number]) Matthias Sterling ([id number])  12/05/2011 CT Results (most recent): 
Results from Hospital Encounter encounter on 11/30/20 CT CHEST WO CONT  Narrative INDICATION: Dyspnea, abnormal x-ray COMPARISON: CT scan of 11/30/2020, chest radiograph 12/8/2020 and 11/30/2020 CONTRAST: None. TECHNIQUE:  5 mm axial images were obtained through the chest. Coronal and 
sagittal reconstructions were generated. The absence of intravenous contrast 
reduces the sensitivity for evaluation of the mediastinum and upper abdominal 
organs. CT dose reduction was achieved through use of a standardized protocol 
tailored for this examination and automatic exposure control for dose 
modulation. FINDINGS: 
The patient has posterior fusion of the thoracic spine with interpedicular 
screws and bilateral rods. This causes significant streak artifact making 
evaluation of the density of the fluid and other structures difficult to 
accurately measure. THYROID: No nodule. MEDIASTINUM: No mass or lymphadenopathy. Small calcified lymph nodes are 
unchanged RENITA: No mass or lymphadenopathy. THORACIC AORTA: No aneurysm. MAIN PULMONARY ARTERY: Normal in caliber. TRACHEA/BRONCHI: Patent. ESOPHAGUS: No wall thickening or dilatation. HEART: Normal in size. There is moderate to severe coronary artery calcification PLEURA: There is now present a large left pleural effusion this has increased in 
the interval. Left pleural effusion occupies approximately one half of the left 
hemithorax. There is a moderate right pleural effusion which has increased 
significantly in the interval and extends from the lung base to the lung apex 
posteriorly. Prudencio Eaton LUNGS:  
 
The lungs are abnormal. The left lower lobe is completely collapsed and airless 
surrounded by the pleural fluid. The left upper lobe demonstrates no pneumonia. There is mild linear scarring or atelectasis inferiorly in the lingula. The right lower lobe is completely collapsed and airless. There are calcified 
granuloma in the right lower lobe. Right upper lobe reveals minimal atelectasis 
posteriorly. Right middle lobe is clear. The aerated lung reveals no pneumonia. There is no significant pulmonary edema. . There are calcified granulomas in the 
right upper lobe anteriorly and laterally. INCIDENTALLY IMAGED UPPER ABDOMEN: There is new ascites in the upper abdomen. There is a well-defined 4.7 cm probable cyst off the upper pole right kidney the 
density measurement is difficult due to the streak artifact. There is also 
hypodensity in the upper pole of the left kidney that is difficult to accurately 
measure but probably represents a cyst. Ultrasound exam would yield more 
information to evaluate the kidneys. Partially imaged are nonobstructing 
bilateral renal stones. . There is a small cyst  in the liver. BONES: Bones are difficult to evaluate due to the streak artifact from the 
metallic hardware. The compression fracture of T12 is again noted. The rounded 
lucency in the L1 vertebral body with sclerotic margin is again noted. Gerldine Prost There is interval development of body wall edema most likely due to third 
spacing of fluid. Impression IMPRESSION: 
 
1. Markedly increased and left pleural effusion which now occupies approximately 
one half of the left hemithorax. The left pleural effusion causes complete 
collapse of left lower lobe. 2. Increased right pleural effusion which causes complete collapse of the right 
lower lobe. 3. No pneumonia or pulmonary edema. 4. There is new ascites in the upper abdomen and there is body wall edema. The 
body wall edema suggesting third spacing of fluid. 5. Bilateral renal lesions not well characterized due to the artifact from the 
metallic hardware in the spine. These are likely cysts. Ultrasound may yield 
more information. Reviewed records in [facility] [place] tab today Lab Review Results for orders placed or performed during the hospital encounter of 11/30/20 CULTURE, BLOOD, PAIRED Specimen: Blood Result Value Ref Range  Special Requests: NO SPECIAL REQUESTS Culture result: NO GROWTH 5 DAYS URINE CULTURE HOLD SAMPLE Specimen: Serum Result Value Ref Range Urine culture hold Urine on hold in Microbiology dept for 2 days. If unpreserved urine is submitted, it cannot be used for addtional testing after 24 hours, recollection will be required. URINE CULTURE HOLD SAMPLE Specimen: Serum; Urine Result Value Ref Range Urine culture hold Urine on hold in Microbiology dept for 2 days. If unpreserved urine is submitted, it cannot be used for addtional testing after 24 hours, recollection will be required. CULTURE, MRSA Specimen: Nares; Nasal  
Result Value Ref Range Special Requests: NO SPECIAL REQUESTS Culture result: MRSA NOT PRESENT Culture result:     
      Screening of patient nares for MRSA is for surveillance purposes and, if positive, to facilitate isolation considerations in high risk settings. It is not intended for automatic decolonization interventions per se as regimens are not sufficiently effective to warrant routine use. CULTURE, URINE Specimen: Clean catch; Urine Result Value Ref Range Special Requests: NO SPECIAL REQUESTS [place] Count >100,000 COLONIES/mL Culture result: ESCHERICHIA COLI (A) Culture result: 2ND STRAIN OF ESCHERICHIA COLI (A) Susceptibility Escherichia coli Second Strain - JUSTYN Amikacin ($) <=2 Susceptible ug/mL Ampicillin ($) 4 Susceptible ug/mL Ampicillin/sulbactam ($) <=2 Susceptible ug/mL Cefazolin ($) <=4 Susceptible ug/mL Cefepime ($$) <=1 Susceptible ug/mL Cefoxitin 32 Resistant ug/mL Ceftazidime ($) <=1 Susceptible ug/mL Ceftriaxone ($) <=1 Susceptible ug/mL Ciprofloxacin ($) <=0.25 Susceptible ug/mL Gentamicin ($) 8 Intermediate ug/mL Levofloxacin ($) <=0.12 Susceptible ug/mL Meropenem ($$) <=0.25 Susceptible ug/mL Nitrofurantoin >=512 Resistant ug/mL   Piperacillin/Tazobac ($) <=4 Susceptible ug/mL Tobramycin ($) <=1 Susceptible ug/mL Trimeth/Sulfa >=320 Resistant ug/mL Escherichia coli - JUSTYN Amikacin ($) 8 Susceptible ug/mL Ampicillin ($) >=32 Resistant ug/mL Ampicillin/sulbactam ($) >=32 Resistant ug/mL Cefazolin ($) <=4 Susceptible ug/mL Cefepime ($$) <=1 Susceptible ug/mL Cefoxitin 8 Susceptible ug/mL Ceftazidime ($) <=1 Susceptible ug/mL Ceftriaxone ($) <=1 Susceptible ug/mL Ciprofloxacin ($) <=0.25 Susceptible ug/mL Gentamicin ($) >=16 Resistant ug/mL Levofloxacin ($) <=0.12 Susceptible ug/mL Meropenem ($$) <=0.25 Susceptible ug/mL Nitrofurantoin >=512 Resistant ug/mL Piperacillin/Tazobac ($) 8 Susceptible ug/mL Tobramycin ($) 8 Intermediate ug/mL Trimeth/Sulfa >=320 Resistant ug/mL CULTURE, BODY FLUID W GRAM STAIN Specimen: Pleural Fluid; Body Fluid Result Value Ref Range Special Requests: NO SPECIAL REQUESTS    
 GRAM STAIN 1+ WBCS SEEN    
 GRAM STAIN WBCS SEEN Culture result: NO GROWTH 4 DAYS    
CBC WITH AUTOMATED DIFF Result Value Ref Range WBC 1.2 (L) 3.6 - 11.0 K/uL  
 RBC 2.49 (L) 3.80 - 5.20 M/uL HGB 8.5 (L) 11.5 - 16.0 g/dL HCT 27.1 (L) 35.0 - 47.0 % .8 (H) 80.0 - 99.0 FL  
 MCH 34.1 (H) 26.0 - 34.0 PG  
 MCHC 31.4 30.0 - 36.5 g/dL RDW 23.2 (H) 11.5 - 14.5 % PLATELET 940 909 - 148 K/uL MPV 11.3 8.9 - 12.9 FL  
 NRBC 1.7 (H) 0  WBC ABSOLUTE NRBC 0.02 (H) 0.00 - 0.01 K/uL NEUTROPHILS 14 (L) 32 - 75 % BAND NEUTROPHILS 4 0 - 6 % LYMPHOCYTES 31 12 - 49 % MONOCYTES 46 (H) 5 - 13 % EOSINOPHILS 2 0 - 7 % BASOPHILS 3 (H) 0 - 1 % IMMATURE GRANULOCYTES 0 %  
 ABS. NEUTROPHILS 0.2 (L) 1.8 - 8.0 K/UL  
 ABS. LYMPHOCYTES 0.4 (L) 0.8 - 3.5 K/UL  
 ABS. MONOCYTES 0.6 0.0 - 1.0 K/UL  
 ABS. EOSINOPHILS 0.0 0.0 - 0.4 K/UL  
 ABS. BASOPHILS 0.0 0.0 - 0.1 K/UL  
 ABS. IMM. GRANS. 0.0 K/UL  
 DF MANUAL    
 RBC COMMENTS ANISOCYTOSIS 2+ 
    
 RBC COMMENTS MACROCYTOSIS 1+ 
    
 RBC COMMENTS OVALOCYTES PRESENT 
    
 RBC COMMENTS SCHISTOCYTES PRESENT 
    
 RBC COMMENTS Pathology Review Requested WBC COMMENTS Differential performed on buffy coat smear. Pathologist review Pathologic examination results can be viewed in [facility] Chart Review under the Pathology tab. METABOLIC PANEL, COMPREHENSIVE Result Value Ref Range Sodium 142 136 - 145 mmol/L Potassium 4.1 3.5 - 5.1 mmol/L Chloride 110 (H) 97 - 108 mmol/L  
 CO2 30 21 - 32 mmol/L Anion gap 2 (L) 5 - 15 mmol/L Glucose 112 (H) 65 - 100 mg/dL BUN 21 (H) 6 - 20 MG/DL Creatinine 0.49 (L) 0.55 - 1.02 MG/DL  
 BUN/Creatinine ratio 43 (H) 12 - 20 GFR est AA >60 >60 ml/min/1.73m2 GFR est non-AA >60 >60 ml/min/1.73m2 Calcium 8.6 8.5 - 10.1 MG/DL Bilirubin, total 0.5 0.2 - 1.0 MG/DL  
 ALT (SGPT) 21 12 - 78 U/L  
 AST (SGOT) 18 15 - 37 U/L Alk. phosphatase 77 45 - 117 U/L Protein, total 5.1 (L) 6.4 - 8.2 g/dL Albumin 2.3 (L) 3.5 - 5.0 g/dL Globulin 2.8 2.0 - 4.0 g/dL A-G Ratio 0.8 (L) 1.1 - 2.2 SAMPLES BEING HELD Result Value Ref Range SAMPLES BEING HELD 1blu,1red,1UC   
 COMMENT Add-on orders for these samples will be processed based on acceptable specimen integrity and analyte stability, which may vary by analyte. LACTIC ACID Result Value Ref Range Lactic acid 1.4 0.4 - 2.0 MMOL/L  
SARS-COV-2 Result Value Ref Range Specimen source Nasopharyngeal    
 SARS-CoV-2 Not detected NOTD Specimen source Nasopharyngeal    
 Specimen type NP Swab Health status Symptomatic Testing URINALYSIS W/MICROSCOPIC Result Value Ref Range Color YELLOW/STRAW Appearance CLEAR CLEAR Specific gravity 1.015 1.003 - 1.030    
 pH (UA) 5.5 5.0 - 8.0 Protein 300 (A) NEG mg/dL Glucose Negative NEG mg/dL Ketone Negative NEG mg/dL Bilirubin Negative NEG Blood TRACE (A) NEG  Urobilinogen 0.2 0.2 - 1.0 EU/dL Nitrites Negative NEG Leukocyte Esterase Negative NEG    
 WBC 0-4 0 - 4 /hpf  
 RBC 0-5 0 - 5 /hpf Epithelial cells FEW FEW /lpf Bacteria 4+ (A) NEG /hpf PROCALCITONIN Result Value Ref Range Procalcitonin 0.11 ng/mL TROPONIN I Result Value Ref Range Troponin-I, Qt. <0.05 <0.05 ng/mL POC EG7 Result Value Ref Range Calcium, ionized (POC) 1.24 1.12 - 1.32 mmol/L  
 pH (POC) 7.43 7.35 - 7.45    
 pCO2 (POC) 46.6 (H) 35.0 - 45.0 MMHG  
 pO2 (POC) 83 80 - 100 MMHG  
 HCO3 (POC) 30.9 (H) 22 - 26 MMOL/L Base excess (POC) 7 mmol/L  
 sO2 (POC) 96 92 - 97 % Site RIGHT RADIAL Device: NASAL CANNULA Flow rate (POC) 1 L/M Allens test (POC) YES Specimen type (POC) ARTERIAL    
TROPONIN I Result Value Ref Range Troponin-I, Qt. <0.05 <0.05 ng/mL METABOLIC PANEL, COMPREHENSIVE Result Value Ref Range Sodium 147 (H) 136 - 145 mmol/L Potassium 3.6 3.5 - 5.1 mmol/L Chloride 112 (H) 97 - 108 mmol/L  
 CO2 26 21 - 32 mmol/L Anion gap 9 5 - 15 mmol/L Glucose 104 (H) 65 - 100 mg/dL BUN 18 6 - 20 MG/DL Creatinine 0.38 (L) 0.55 - 1.02 MG/DL  
 BUN/Creatinine ratio 47 (H) 12 - 20 GFR est AA >60 >60 ml/min/1.73m2 GFR est non-AA >60 >60 ml/min/1.73m2 Calcium 8.2 (L) 8.5 - 10.1 MG/DL Bilirubin, total 0.5 0.2 - 1.0 MG/DL  
 ALT (SGPT) 14 12 - 78 U/L  
 AST (SGOT) 8 (L) 15 - 37 U/L Alk. phosphatase 62 45 - 117 U/L Protein, total 4.2 (L) 6.4 - 8.2 g/dL Albumin 1.9 (L) 3.5 - 5.0 g/dL Globulin 2.3 2.0 - 4.0 g/dL A-G Ratio 0.8 (L) 1.1 - 2.2    
CBC WITH AUTOMATED DIFF Result Value Ref Range WBC 1.5 (L) 3.6 - 11.0 K/uL  
 RBC 2.15 (L) 3.80 - 5.20 M/uL HGB 7.2 (L) 11.5 - 16.0 g/dL HCT 23.9 (L) 35.0 - 47.0 % .2 (H) 80.0 - 99.0 FL  
 MCH 33.5 26.0 - 34.0 PG  
 MCHC 30.1 30.0 - 36.5 g/dL RDW 23.0 (H) 11.5 - 14.5 % PLATELET 373 647 - 899 K/uL MPV 10.3 8.9 - 12.9 FL  
 NRBC 1.3 (H) 0  WBC ABSOLUTE NRBC 0.02 (H) 0.00 - 0.01 K/uL NEUTROPHILS 20 (L) 32 - 75 % LYMPHOCYTES 33 12 - 49 % MONOCYTES 42 (H) 5 - 13 % EOSINOPHILS 0 0 - 7 % BASOPHILS 5 (H) 0 - 1 % IMMATURE GRANULOCYTES 0 %  
 ABS. NEUTROPHILS 0.3 (L) 1.8 - 8.0 K/UL  
 ABS. LYMPHOCYTES 0.5 (L) 0.8 - 3.5 K/UL  
 ABS. MONOCYTES 0.6 0.0 - 1.0 K/UL  
 ABS. EOSINOPHILS 0.0 0.0 - 0.4 K/UL  
 ABS. BASOPHILS 0.1 0.0 - 0.1 K/UL  
 ABS. IMM. GRANS. 0.0 K/UL  
 DF MANUAL    
 RBC COMMENTS OVALOCYTES PRESENT 
    
 RBC COMMENTS ANISOCYTOSIS 2+ 
    
 RBC COMMENTS MACROCYTOSIS 2+ SAMPLES BEING HELD Result Value Ref Range SAMPLES BEING HELD 1BLU,1RED COMMENT Add-on orders for these samples will be processed based on acceptable specimen integrity and analyte stability, which may vary by analyte. SED RATE (ESR) Result Value Ref Range Sed rate, automated 61 (H) 0 - 30 mm/hr PROTEIN ELECTROPHORESIS Result Value Ref Range Protein, total 4.3 (L) 6.0 - 8.5 g/dL Albumin 2.1 (L) 2.9 - 4.4 g/dL Alpha-1-globulin 0.4 0.0 - 0.4 g/dL ALPHA-2 GLOBULIN 0.3 (L) 0.4 - 1.0 g/dL Beta globulin 1.3 0.7 - 1.3 g/dL Gamma globulin 0.3 (L) 0.4 - 1.8 g/dL M-Tom Not Observed Not Observed g/dL Globulin, total 2.2 2.2 - 3.9 g/dL A/G ratio 1.0 0.7 - 1.7 VITAMIN B12 Result Value Ref Range Vitamin B12 >2,000 (H) 193 - 986 pg/mL TSH 3RD GENERATION Result Value Ref Range TSH 1.64 0.36 - 3.74 uIU/mL  
BERTHA, DIRECT, W/REFLEX Result Value Ref Range BERTHA, Direct Negative Negative CBC WITH AUTOMATED DIFF Result Value Ref Range WBC 1.3 (L) 3.6 - 11.0 K/uL  
 RBC 2.46 (L) 3.80 - 5.20 M/uL HGB 8.5 (L) 11.5 - 16.0 g/dL HCT 27.2 (L) 35.0 - 47.0 % .6 (H) 80.0 - 99.0 FL  
 MCH 34.6 (H) 26.0 - 34.0 PG  
 MCHC 31.3 30.0 - 36.5 g/dL RDW 22.6 (H) 11.5 - 14.5 % PLATELET 226 898 - 420 K/uL MPV 10.8 8.9 - 12.9 FL  
 NRBC 1.6 (H) 0  WBC  ABSOLUTE NRBC 0.02 (H) 0.00 - 0.01 K/uL  
 NEUTROPHILS 14 (L) 32 - 75 % BAND NEUTROPHILS 2 0 - 6 % LYMPHOCYTES 39 12 - 49 % MONOCYTES 43 (H) 5 - 13 % EOSINOPHILS 0 0 - 7 % BASOPHILS 2 (H) 0 - 1 % IMMATURE GRANULOCYTES 0 %  
 ABS. NEUTROPHILS 0.2 (L) 1.8 - 8.0 K/UL  
 ABS. LYMPHOCYTES 0.5 (L) 0.8 - 3.5 K/UL  
 ABS. MONOCYTES 0.6 0.0 - 1.0 K/UL  
 ABS. EOSINOPHILS 0.0 0.0 - 0.4 K/UL  
 ABS. BASOPHILS 0.0 0.0 - 0.1 K/UL  
 ABS. IMM. GRANS. 0.0 K/UL  
 DF MANUAL    
 RBC COMMENTS ANISOCYTOSIS 2+ 
    
 RBC COMMENTS MACROCYTOSIS 
1+ WBC COMMENTS ATYPICAL LYMPHOCYTES PRESENT    
METABOLIC PANEL, COMPREHENSIVE Result Value Ref Range Sodium 147 (H) 136 - 145 mmol/L Potassium 3.2 (L) 3.5 - 5.1 mmol/L Chloride 113 (H) 97 - 108 mmol/L  
 CO2 25 21 - 32 mmol/L Anion gap 9 5 - 15 mmol/L Glucose 94 65 - 100 mg/dL BUN 21 (H) 6 - 20 MG/DL Creatinine 0.43 (L) 0.55 - 1.02 MG/DL  
 BUN/Creatinine ratio 49 (H) 12 - 20 GFR est AA >60 >60 ml/min/1.73m2 GFR est non-AA >60 >60 ml/min/1.73m2 Calcium 8.7 8.5 - 10.1 MG/DL Bilirubin, total 0.4 0.2 - 1.0 MG/DL  
 ALT (SGPT) 12 12 - 78 U/L  
 AST (SGOT) 8 (L) 15 - 37 U/L Alk. phosphatase 62 45 - 117 U/L Protein, total 4.8 (L) 6.4 - 8.2 g/dL Albumin 1.8 (L) 3.5 - 5.0 g/dL Globulin 3.0 2.0 - 4.0 g/dL A-G Ratio 0.6 (L) 1.1 - 2.2    
CBC WITH AUTOMATED DIFF Result Value Ref Range WBC  K/uL SPECIMEN INTEGRITY QUESTIONABLE. SUGGEST RECOLLECTION  
 RBC  M/uL SPECIMEN INTEGRITY QUESTIONABLE. SUGGEST RECOLLECTION  
 HGB  12.0 - 16.0 g/dL SPECIMEN INTEGRITY QUESTIONABLE. SUGGEST RECOLLECTION  
 HCT  % SPECIMEN INTEGRITY QUESTIONABLE. SUGGEST RECOLLECTION  
 MCV  78.0 - 102.0 FL  
  SPECIMEN INTEGRITY QUESTIONABLE. SUGGEST RECOLLECTION  
 MCH  25.0 - 35.0 PG  
  SPECIMEN INTEGRITY QUESTIONABLE. SUGGEST RECOLLECTION  
 MCHC  31.0 - 37.0 g/dL SPECIMEN INTEGRITY QUESTIONABLE. SUGGEST RECOLLECTION  
 RDW  11.5 - 14.5 %   SPECIMEN INTEGRITY QUESTIONABLE. SUGGEST RECOLLECTION  
 PLATELET  K/uL SPECIMEN INTEGRITY QUESTIONABLE. SUGGEST RECOLLECTION  
 MPV  7.4 - 10.4 FL  
  SPECIMEN INTEGRITY QUESTIONABLE. SUGGEST RECOLLECTION  
 NRBC  0  WBC SPECIMEN INTEGRITY QUESTIONABLE. SUGGEST RECOLLECTION ABSOLUTE NRBC  K/uL SPECIMEN INTEGRITY QUESTIONABLE. SUGGEST RECOLLECTION  
 NEUTROPHILS  42 - 75 % SPECIMEN INTEGRITY QUESTIONABLE. SUGGEST RECOLLECTION  
 LYMPHOCYTES  12 - 49 % SPECIMEN INTEGRITY QUESTIONABLE. SUGGEST RECOLLECTION  
 MONOCYTES  5 - 13 % SPECIMEN INTEGRITY QUESTIONABLE. SUGGEST RECOLLECTION  
 EOSINOPHILS  0 - 5 % SPECIMEN INTEGRITY QUESTIONABLE. SUGGEST RECOLLECTION  
 BASOPHILS  0 - 1 % SPECIMEN INTEGRITY QUESTIONABLE. SUGGEST RECOLLECTION  
 IMMATURE GRANULOCYTES  0.0 - 5.0 % SPECIMEN INTEGRITY QUESTIONABLE. SUGGEST RECOLLECTION  
 ABS. NEUTROPHILS  K/UL SPECIMEN INTEGRITY QUESTIONABLE. SUGGEST RECOLLECTION  
 ABS. LYMPHOCYTES  K/UL SPECIMEN INTEGRITY QUESTIONABLE. SUGGEST RECOLLECTION  
 ABS. MONOCYTES  K/UL SPECIMEN INTEGRITY QUESTIONABLE. SUGGEST RECOLLECTION  
 ABS. EOSINOPHILS  K/UL SPECIMEN INTEGRITY QUESTIONABLE. SUGGEST RECOLLECTION  
 ABS. BASOPHILS  K/UL SPECIMEN INTEGRITY QUESTIONABLE. SUGGEST RECOLLECTION  
 ABS. IMM. GRANS.  0.0 - 0.5 K/UL SPECIMEN INTEGRITY QUESTIONABLE. SUGGEST RECOLLECTION  
 DF     
  SPECIMEN INTEGRITY QUESTIONABLE. SUGGEST RECOLLECTION  
METABOLIC PANEL, COMPREHENSIVE Result Value Ref Range Sodium 147 (H) 136 - 145 mmol/L Potassium 4.2 3.5 - 5.1 mmol/L Chloride 119 (H) 97 - 108 mmol/L  
 CO2 21 21 - 32 mmol/L Anion gap 7 5 - 15 mmol/L Glucose 83 65 - 100 mg/dL BUN 24 (H) 6 - 20 MG/DL Creatinine 0.48 (L) 0.55 - 1.02 MG/DL  
 BUN/Creatinine ratio 50 (H) 12 - 20 GFR est AA >60 >60 ml/min/1.73m2 GFR est non-AA >60 >60 ml/min/1.73m2 Calcium 8.3 (L) 8.5 - 10.1 MG/DL  Bilirubin, total 0.4 0.2 - 1.0 MG/DL  
 ALT (SGPT) 12 12 - 78 U/L  
 AST (SGOT) 10 (L) 15 - 37 U/L Alk. phosphatase 58 45 - 117 U/L Protein, total 4.5 (L) 6.4 - 8.2 g/dL Albumin 1.6 (L) 3.5 - 5.0 g/dL Globulin 2.9 2.0 - 4.0 g/dL A-G Ratio 0.6 (L) 1.1 - 2.2    
CBC WITH AUTOMATED DIFF Result Value Ref Range WBC 1.2 (L) 3.6 - 11.0 K/uL  
 RBC 2.48 (L) 3.80 - 5.20 M/uL HGB 8.5 (L) 11.5 - 16.0 g/dL HCT 27.4 (L) 35.0 - 47.0 % .5 (H) 80.0 - 99.0 FL  
 MCH 34.3 (H) 26.0 - 34.0 PG  
 MCHC 31.0 30.0 - 36.5 g/dL RDW 22.8 (H) 11.5 - 14.5 % PLATELET 393 306 - 993 K/uL MPV 11.0 8.9 - 12.9 FL  
 NRBC 1.7 (H) 0  WBC ABSOLUTE NRBC 0.02 (H) 0.00 - 0.01 K/uL NEUTROPHILS 15 (L) 32 - 75 % LYMPHOCYTES 41 12 - 49 % MONOCYTES 35 (H) 5 - 13 % EOSINOPHILS 6 0 - 7 % BASOPHILS 3 (H) 0 - 1 % IMMATURE GRANULOCYTES 0 0.0 - 0.5 % ABS. NEUTROPHILS 0.2 (L) 1.8 - 8.0 K/UL  
 ABS. LYMPHOCYTES 0.5 (L) 0.8 - 3.5 K/UL  
 ABS. MONOCYTES 0.4 0.0 - 1.0 K/UL  
 ABS. EOSINOPHILS 0.1 0.0 - 0.4 K/UL  
 ABS. BASOPHILS 0.0 0.0 - 0.1 K/UL  
 ABS. IMM. GRANS. 0.0 0.00 - 0.04 K/UL  
 DF SMEAR SCANNED    
 RBC COMMENTS ANISOCYTOSIS 1+ SAMPLES BEING HELD Result Value Ref Range SAMPLES BEING HELD  1 TALL PST COMMENT Add-on orders for these samples will be processed based on acceptable specimen integrity and analyte stability, which may vary by analyte. HEMOGLOBIN A1C WITH EAG Result Value Ref Range Hemoglobin A1c 4.7 4.0 - 5.6 % Est. average glucose 88 mg/dL CBC W/O DIFF Result Value Ref Range WBC 1.1 (L) 3.6 - 11.0 K/uL  
 RBC 2.58 (L) 3.80 - 5.20 M/uL HGB 8.8 (L) 11.5 - 16.0 g/dL HCT 28.6 (L) 35.0 - 47.0 % .9 (H) 80.0 - 99.0 FL  
 MCH 34.1 (H) 26.0 - 34.0 PG  
 MCHC 30.8 30.0 - 36.5 g/dL RDW 23.3 (H) 11.5 - 14.5 % PLATELET 994 146 - 422 K/uL MPV 10.9 8.9 - 12.9 FL  
 NRBC 1.9 (H) 0  WBC ABSOLUTE NRBC 0.02 (H) 0.00 - 0.01 K/uL METABOLIC PANEL, COMPREHENSIVE Result Value Ref Range Sodium 146 (H) 136 - 145 mmol/L Potassium 4.0 3.5 - 5.1 mmol/L Chloride 118 (H) 97 - 108 mmol/L  
 CO2 20 (L) 21 - 32 mmol/L Anion gap 8 5 - 15 mmol/L Glucose 110 (H) 65 - 100 mg/dL BUN 26 (H) 6 - 20 MG/DL Creatinine 0.55 0.55 - 1.02 MG/DL  
 BUN/Creatinine ratio 47 (H) 12 - 20 GFR est AA >60 >60 ml/min/1.73m2 GFR est non-AA >60 >60 ml/min/1.73m2 Calcium 8.6 8.5 - 10.1 MG/DL Bilirubin, total 0.3 0.2 - 1.0 MG/DL  
 ALT (SGPT) 13 12 - 78 U/L  
 AST (SGOT) 7 (L) 15 - 37 U/L Alk. phosphatase 64 45 - 117 U/L Protein, total 4.7 (L) 6.4 - 8.2 g/dL Albumin 1.8 (L) 3.5 - 5.0 g/dL Globulin 2.9 2.0 - 4.0 g/dL A-G Ratio 0.6 (L) 1.1 - 2.2    
CBC WITH AUTOMATED DIFF Result Value Ref Range WBC PLEASE DISREGARD RESULTS 3.6 - 11.0 K/uL  
 RBC PLEASE DISREGARD RESULTS 3.80 - 5.20 M/uL HGB PLEASE DISREGARD RESULTS 11.5 - 16.0 g/dL HCT PLEASE DISREGARD RESULTS 35.0 - 47.0 % MCV Cannot be calculated 80.0 - 99.0 FL  
 MCH Cannot be calculated 26.0 - 34.0 PG  
 MCHC Cannot be calculated 30.0 - 36.5 g/dL RDW PLEASE DISREGARD RESULTS 11.5 - 14.5 % PLATELET PLEASE DISREGARD RESULTS 150 - 400 K/uL MPV PLEASE DISREGARD RESULTS 8.9 - 12.9 FL  
 NRBC PLEASE DISREGARD RESULTS 0  WBC ABSOLUTE NRBC PLEASE DISREGARD RESULTS 0.00 - 0.01 K/uL NEUTROPHILS PLEASE DISREGARD RESULTS 32 - 75 % LYMPHOCYTES PLEASE DISREGARD RESULTS 12 - 49 % MONOCYTES PLEASE DISREGARD RESULTS 5 - 13 % EOSINOPHILS PLEASE DISREGARD RESULTS 0 - 7 % BASOPHILS PLEASE DISREGARD RESULTS 0 - 1 % IMMATURE GRANULOCYTES PLEASE DISREGARD RESULTS 0.0 - 0.5 % ABS. NEUTROPHILS PLEASE DISREGARD RESULTS 1.8 - 8.0 K/UL  
 ABS. LYMPHOCYTES Not performed 0.8 - 3.5 K/UL  
 ABS. MONOCYTES Not performed 0.0 - 1.0 K/UL  
 ABS. EOSINOPHILS Not performed 0.0 - 0.4 K/UL  
 ABS. BASOPHILS Not performed 0.0 - 0.1 K/UL  
 ABS. IMM. GRANS.  PLEASE DISREGARD RESULTS 0.00 - 0.04 K/UL  
 DF PLEASE DISREGARD RESULTS    
CBC WITH AUTOMATED DIFF Result Value Ref Range WBC 3.0 (L) 3.6 - 11.0 K/uL  
 RBC 2.47 (L) 3.80 - 5.20 M/uL HGB 8.4 (L) 11.5 - 16.0 g/dL HCT 27.3 (L) 35.0 - 47.0 % .5 (H) 80.0 - 99.0 FL  
 MCH 34.0 26.0 - 34.0 PG  
 MCHC 30.8 30.0 - 36.5 g/dL RDW 22.5 (H) 11.5 - 14.5 % PLATELET 308 866 - 535 K/uL MPV 10.8 8.9 - 12.9 FL  
 NRBC 1.4 (H) 0  WBC ABSOLUTE NRBC 0.04 (H) 0.00 - 0.01 K/uL NEUTROPHILS 45 32 - 75 % BAND NEUTROPHILS 9 (H) 0 - 6 % LYMPHOCYTES 22 12 - 49 % MONOCYTES 21 (H) 5 - 13 % EOSINOPHILS 1 0 - 7 % BASOPHILS 2 (H) 0 - 1 % IMMATURE GRANULOCYTES 0 %  
 ABS. NEUTROPHILS 1.6 (L) 1.8 - 8.0 K/UL  
 ABS. LYMPHOCYTES 0.7 (L) 0.8 - 3.5 K/UL  
 ABS. MONOCYTES 0.6 0.0 - 1.0 K/UL  
 ABS. EOSINOPHILS 0.0 0.0 - 0.4 K/UL  
 ABS. BASOPHILS 0.1 0.0 - 0.1 K/UL  
 ABS. IMM. GRANS. 0.0 K/UL  
 DF MANUAL    
 RBC COMMENTS MACROCYTOSIS 2+ 
    
 RBC COMMENTS ANISOCYTOSIS 2+ 
    
 RBC COMMENTS OVALOCYTES PRESENT 
    
 RBC COMMENTS HYPOCHROMIA 1+ WBC COMMENTS VACUOLATED POLYS METABOLIC PANEL, COMPREHENSIVE Result Value Ref Range Sodium 148 (H) 136 - 145 mmol/L Potassium 4.0 3.5 - 5.1 mmol/L Chloride 118 (H) 97 - 108 mmol/L  
 CO2 22 21 - 32 mmol/L Anion gap 8 5 - 15 mmol/L Glucose 89 65 - 100 mg/dL BUN 28 (H) 6 - 20 MG/DL Creatinine 0.55 0.55 - 1.02 MG/DL  
 BUN/Creatinine ratio 51 (H) 12 - 20 GFR est AA >60 >60 ml/min/1.73m2 GFR est non-AA >60 >60 ml/min/1.73m2 Calcium 8.5 8.5 - 10.1 MG/DL Bilirubin, total 0.3 0.2 - 1.0 MG/DL  
 ALT (SGPT) 11 (L) 12 - 78 U/L  
 AST (SGOT) 8 (L) 15 - 37 U/L Alk. phosphatase 65 45 - 117 U/L Protein, total 4.1 (L) 6.4 - 8.2 g/dL Albumin 1.8 (L) 3.5 - 5.0 g/dL Globulin 2.3 2.0 - 4.0 g/dL A-G Ratio 0.8 (L) 1.1 - 2.2    
CBC WITH AUTOMATED DIFF Result Value Ref Range  WBC 4.6 3.6 - 11.0 K/uL  
 RBC 2.32 (L) 3.80 - 5.20 M/uL  
 HGB 7.8 (L) 11.5 - 16.0 g/dL HCT 25.8 (L) 35.0 - 47.0 % .2 (H) 80.0 - 99.0 FL  
 MCH 33.6 26.0 - 34.0 PG  
 MCHC 30.2 30.0 - 36.5 g/dL RDW 22.6 (H) 11.5 - 14.5 % PLATELET 090 775 - 679 K/uL MPV 11.0 8.9 - 12.9 FL  
 NRBC 1.1 (H) 0  WBC ABSOLUTE NRBC 0.05 (H) 0.00 - 0.01 K/uL NEUTROPHILS 56 32 - 75 % BAND NEUTROPHILS 6 0 - 6 % LYMPHOCYTES 10 (L) 12 - 49 % MONOCYTES 25 (H) 5 - 13 % EOSINOPHILS 2 0 - 7 % BASOPHILS 1 0 - 1 % IMMATURE GRANULOCYTES 0 %  
 ABS. NEUTROPHILS 2.8 1.8 - 8.0 K/UL  
 ABS. LYMPHOCYTES 0.5 (L) 0.8 - 3.5 K/UL  
 ABS. MONOCYTES 1.2 (H) 0.0 - 1.0 K/UL  
 ABS. EOSINOPHILS 0.1 0.0 - 0.4 K/UL  
 ABS. BASOPHILS 0.0 0.0 - 0.1 K/UL  
 ABS. IMM. GRANS. 0.0 K/UL  
 DF MANUAL PLATELET COMMENTS Large Platelets RBC COMMENTS ANISOCYTOSIS 2+ 
    
 RBC COMMENTS MACROCYTOSIS 2+ 
    
 RBC COMMENTS OVALOCYTES 1+ RBC COMMENTS SCHISTOCYTES PRESENT 
    
METABOLIC PANEL, BASIC Result Value Ref Range Sodium 146 (H) 136 - 145 mmol/L Potassium 3.4 (L) 3.5 - 5.1 mmol/L Chloride 117 (H) 97 - 108 mmol/L  
 CO2 23 21 - 32 mmol/L Anion gap 6 5 - 15 mmol/L Glucose 103 (H) 65 - 100 mg/dL BUN 28 (H) 6 - 20 MG/DL Creatinine 0.53 (L) 0.55 - 1.02 MG/DL  
 BUN/Creatinine ratio 53 (H) 12 - 20 GFR est AA >60 >60 ml/min/1.73m2 GFR est non-AA >60 >60 ml/min/1.73m2 Calcium 8.7 8.5 - 10.1 MG/DL  
CBC WITH AUTOMATED DIFF Result Value Ref Range WBC 15.8 (H) 3.6 - 11.0 K/uL  
 RBC 2.37 (L) 3.80 - 5.20 M/uL HGB 8.3 (L) 11.5 - 16.0 g/dL HCT 25.5 (L) 35.0 - 47.0 % .6 (H) 80.0 - 99.0 FL  
 MCH 35.0 (H) 26.0 - 34.0 PG  
 MCHC 32.5 30.0 - 36.5 g/dL RDW 22.1 (H) 11.5 - 14.5 % PLATELET 422 156 - 302 K/uL MPV 10.9 8.9 - 12.9 FL  
 NRBC 0.4 (H) 0  WBC ABSOLUTE NRBC 0.07 (H) 0.00 - 0.01 K/uL NEUTROPHILS 65 32 - 75 % BAND NEUTROPHILS 11 (H) 0 - 6 % LYMPHOCYTES 6 (L) 12 - 49 %  MONOCYTES 16 (H) 5 - 13 % EOSINOPHILS 2 0 - 7 % BASOPHILS 0 0 - 1 % IMMATURE GRANULOCYTES 0 %  
 ABS. NEUTROPHILS 12.1 (H) 1.8 - 8.0 K/UL  
 ABS. LYMPHOCYTES 0.9 0.8 - 3.5 K/UL  
 ABS. MONOCYTES 2.5 (H) 0.0 - 1.0 K/UL  
 ABS. EOSINOPHILS 0.3 0.0 - 0.4 K/UL  
 ABS. BASOPHILS 0.0 0.0 - 0.1 K/UL  
 ABS. IMM. GRANS. 0.0 K/UL  
 DF MANUAL    
 RBC COMMENTS ANISOCYTOSIS 2+ 
    
 RBC COMMENTS MACROCYTOSIS 1+ 
    
 RBC COMMENTS OVALOCYTES 1+ AMYLASE, FLUID Result Value Ref Range Fluid Type: PLEURAL FLUID Amylase, body fld. 12 U/L  
CELL COUNT, BODY FLUID Result Value Ref Range BODY FLUID TYPE PLEURAL FLUID FLUID COLOR YELLOW    
 FLUID APPEARANCE SLIGHTLY HAZY FLUID RBC CT. 51 (H) 0 /cu mm FLUID NUCLEATED CELLS 250 /cu mm  
 FLD NEUTROPHILS 11 (A) NRRE % FLD LYMPHS 53 (A) NRRE % FLD MONO/MACROPHAGES 34 (A) NRRE % FLUID MESOTHELIAL 2 (A) NRRE % GLUCOSE, FLUID Result Value Ref Range Fluid Type: PLEURAL FLUID Glucose, body fld. 112 MG/DL  
LDH, BODY FLUID Result Value Ref Range Fluid Type: PLEURAL FLUID    
 LD, body fld. 65 U/L  
PH, FLUID Result Value Ref Range FLUID TYPE(15) PLEURAL FLUID FLUID PH 7.0 PROTEIN TOTAL, FLUID Result Value Ref Range Fluid Type: PLEURAL FLUID Protein total, body fld. 1.5 g/dL TRIGLYCERIDES, FLUID Result Value Ref Range Fluid Type: PLEURAL FLUID Triglyceride, body fld. <72 MG/DL  
METABOLIC PANEL, BASIC Result Value Ref Range Sodium 146 (H) 136 - 145 mmol/L Potassium 2.8 (L) 3.5 - 5.1 mmol/L Chloride 113 (H) 97 - 108 mmol/L  
 CO2 28 21 - 32 mmol/L Anion gap 5 5 - 15 mmol/L Glucose 98 65 - 100 mg/dL BUN 22 (H) 6 - 20 MG/DL Creatinine 0.51 (L) 0.55 - 1.02 MG/DL  
 BUN/Creatinine ratio 43 (H) 12 - 20 GFR est AA >60 >60 ml/min/1.73m2 GFR est non-AA >60 >60 ml/min/1.73m2 Calcium 8.8 8.5 - 10.1 MG/DL  
CBC WITH AUTOMATED DIFF Result Value Ref Range  WBC 11.6 (H) 3.6 - 11.0 K/uL  
 RBC 2.30 (L) 3.80 - 5.20 M/uL HGB 7.8 (L) 11.5 - 16.0 g/dL HCT 24.2 (L) 35.0 - 47.0 % .2 (H) 80.0 - 99.0 FL  
 MCH 33.9 26.0 - 34.0 PG  
 MCHC 32.2 30.0 - 36.5 g/dL RDW 21.8 (H) 11.5 - 14.5 % PLATELET 492 153 - 396 K/uL MPV 10.9 8.9 - 12.9 FL  
 NRBC 0.7 (H) 0  WBC ABSOLUTE NRBC 0.08 (H) 0.00 - 0.01 K/uL NEUTROPHILS 73 32 - 75 % BAND NEUTROPHILS 6 0 - 6 % LYMPHOCYTES 6 (L) 12 - 49 % MONOCYTES 10 5 - 13 % EOSINOPHILS 3 0 - 7 % BASOPHILS 0 0 - 1 % METAMYELOCYTES 2 (H) 0 % IMMATURE GRANULOCYTES 0 %  
 ABS. NEUTROPHILS 9.2 (H) 1.8 - 8.0 K/UL  
 ABS. LYMPHOCYTES 0.7 (L) 0.8 - 3.5 K/UL  
 ABS. MONOCYTES 1.2 (H) 0.0 - 1.0 K/UL  
 ABS. EOSINOPHILS 0.3 0.0 - 0.4 K/UL  
 ABS. BASOPHILS 0.0 0.0 - 0.1 K/UL  
 ABS. IMM. GRANS. 0.0 K/UL  
 DF MANUAL    
 RBC COMMENTS ANISOCYTOSIS 
2+ WBC COMMENTS TOXIC GRANULATION    
SARS-COV-2 Result Value Ref Range Specimen source Nasopharyngeal    
 SARS-CoV-2 Not detected NOTD Specimen source NP SWAB Specimen type NP Swab Health status Symptomatic Testing CBC W/O DIFF Result Value Ref Range WBC 9.0 3.6 - 11.0 K/uL  
 RBC 2.05 (L) 3.80 - 5.20 M/uL HGB 6.9 (L) 11.5 - 16.0 g/dL HCT 21.9 (L) 35.0 - 47.0 % .8 (H) 80.0 - 99.0 FL  
 MCH 33.7 26.0 - 34.0 PG  
 MCHC 31.5 30.0 - 36.5 g/dL RDW 22.4 (H) 11.5 - 14.5 % PLATELET 072 600 - 607 K/uL MPV 10.6 8.9 - 12.9 FL  
 NRBC 0.6 (H) 0  WBC ABSOLUTE NRBC 0.05 (H) 0.00 - 0.01 K/uL METABOLIC PANEL, BASIC Result Value Ref Range Sodium 148 (H) 136 - 145 mmol/L Potassium 3.4 (L) 3.5 - 5.1 mmol/L Chloride 114 (H) 97 - 108 mmol/L  
 CO2 28 21 - 32 mmol/L Anion gap 6 5 - 15 mmol/L Glucose 100 65 - 100 mg/dL BUN 23 (H) 6 - 20 MG/DL Creatinine 0.60 0.55 - 1.02 MG/DL  
 BUN/Creatinine ratio 38 (H) 12 - 20 GFR est AA >60 >60 ml/min/1.73m2 GFR est non-AA >60 >60 ml/min/1.73m2  Calcium 8.7 8.5 - 10.1 MG/DL  
CBC W/O DIFF Result Value Ref Range WBC 9.8 3.6 - 11.0 K/uL  
 RBC 2.58 (L) 3.80 - 5.20 M/uL HGB 8.5 (L) 11.5 - 16.0 g/dL HCT 26.7 (L) 35.0 - 47.0 % .5 (H) 80.0 - 99.0 FL  
 MCH 32.9 26.0 - 34.0 PG  
 MCHC 31.8 30.0 - 36.5 g/dL RDW 21.2 (H) 11.5 - 14.5 % PLATELET 099 922 - 713 K/uL MPV 10.7 8.9 - 12.9 FL  
 NRBC 0.5 (H) 0  WBC ABSOLUTE NRBC 0.05 (H) 0.00 - 0.01 K/uL EKG, 12 LEAD, INITIAL Result Value Ref Range Ventricular Rate 102 BPM  
 Atrial Rate 102 BPM  
 P-R Interval 170 ms QRS Duration 82 ms Q-T Interval 316 ms  
 QTC Calculation (Bezet) 411 ms Calculated P Axis 45 degrees Calculated R Axis 23 degrees Calculated T Axis 47 degrees Diagnosis Sinus tachycardia No previous ECGs available Confirmed by Ratna Arrington MD, [facility] ([id number]) on 11/30/2020 5:52:33 PM 
  
TYPE & SCREEN Result Value Ref Range Crossmatch Expiration 12/13/2020,2359 ABO/Rh(D) O POSITIVE Antibody screen NEG Unit number [id number] Blood component type RC LR Unit division 00 Status of unit TRANSFUSED Crossmatch result Compatible RBC, ALLOCATE Result Value Ref Range HISTORY CHECKED? Historical check performed ECHO ADULT COMPLETE Result Value Ref Range IVSd 1.05 (A) 0.60 - 0.90 cm LVIDd 3.39 (A) 3.90 - 5.30 cm LVIDs 2.03 cm  
 LVPWd 0.91 (A) 0.60 - 0.90 cm  
 LVOT Peak Gradient 4.68 mmHg LVOT Peak Velocity 108.14 cm/s AoV PG 4.95 mmHg Aortic Valve Systolic Peak Velocity 110.08 cm/s  
 MV A Khanh 89.60 cm/s Mitral Valve E Wave Deceleration Time 112.48 ms MV E Khanh 48.10 cm/s Mitral Valve Pressure Half-time 32.62 ms  
 MVA (PHT) 6.74 cm2 Pulmonic Valve Systolic Peak Instantaneous Gradient 4.92 mmHg Tapse 1.87 1.50 - 2.00 cm MV E/A 0.54 LV Mass AL 95.6 67.0 - 162.0 g  
 LV Mass AL Index 54.6 43.0 - 95.0 g/m2 Exam: 
Visit Vitals BP (!) 150/90 Ht 5' 6\" (1.676 m) Wt 125 lb (56.7 kg) BMI 20.18 kg/m² Exam April, 2021: 
General:  Alert, cooperative, no distress. Head:  Normocephalic, without obvious abnormality, atraumatic. Respiratory: 
Heart:   Non labored breathing Regular rate and rhythm, no murmurs Neck:   2+ carotids, no bruits Extremities: Warm, no cyanosis or edema. Pulses: 2+ radial pulses. Neurologic:  MS: Alert and oriented x 4, speech hoarse, attributes to pollen. Language intact. Attention and fund of knowledge appropriate. Recent and remote memory intact. Cranial Nerves: 
II: visual fields II: pupils II: optic disc   
III,VII: ptosis none III,IV,VI: extraocular muscles  EOMI, no nystagmus or diplopia V: facial light touch sensation VII: facial muscle function   symmetric VIII: hearing intact IX: soft palate elevation  normal  
XI: trapezius strength  5/5 XI: sternocleidomastoid strength 5/5 XII: tongue  Midline Motor: Diffuse decreased bulk in all extremities. Strength: 4/5 deltoids, right bicep 4/5, tricep 3/5, WF/WE 0/5, FF 4/5, FE 0/5, interossei 0/5, 4+/5 HF, LE 4+/5, LF 4+/5, DF 0/5, PF 2/5, Foot inversion and eversion 0/5. Lower extremity spasticity. Flaccid distal UE. No tremors seen. Sensory: Dec to PP in LE compared to arms, pt noted vibratory sensation long after it has stopped so result not accurate. Coordination: Unable to assess due to weakness Gait: non-ambulatory Reflexes: 2+ symmetric in UE, absent in LE, toes downgoing Assessment/Plan Pt is a 78 y.o. right handed female hospitalized with acute metabolic encephalopathy 63/76/25 - 12/11/20 with UTI and PNA on background of MDS with chronic leukopenia/neutropenia, diagnosed in Sept, 2020 followed by Dr. Bon Lubin, noted to have chronic upper and lower extremity weakness. She has h/o extensive spine surgery with fusion from T4 to ilium by [facility], last surgery was in June, 2019.  She was [facility] in July, 2020 and had NCS/EMG 7/23/20 with Dr. 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 Symptoms started in right hand then several weeks later, the left hand became involved, she also had worsening of LE weakness. No significant numbness or b/b changes reported. NCS/EMG 4/28/21 with Dr. Chantell Davis revealed a severe axonal sensorimotor peripheral polyneuropathy, not a pure motor neuropathy. Active on chronic motor axon loss in right C5-6 innervated myotomes with evidence of reinnervation suggestive of a possible right C5-6 motor radiculopathy. No evidence of a right lumbosacral motor radiculopathy. Exam in April with hoarse voice, diffuse atrophy of extremities, distal>proximal asymmetric weakness that is equal in upper vs lower extremities, with diminished LE reflexes, but spasticity in LE. This is a complex neuromuscular case. PN could be due to MDS and/or a paraneoplastic process. No other etiology found. Encouraged pt to schedule MRI C-spine, pending these results, may refer to a NM specialist. F/u in clinic after testing is completed. ICD-10-CM ICD-9-CM 1. Polyneuropathy  G62.9 356.9 2. Cervical radiculopathy  M54.12 723.4 Signed: Tc Corrigan MD 
5/12/2021

## 2021-05-12 NOTE — LETTER
5/20/2021    Patient: Chio Peña   YOB: 1945   Date of Visit: 5/12/2021     Theresa Lindquist MD  [address] [place] [address] Drive [id number]  Via Fax: [phone]    Dear Theresa Lindquist MD,      Thank you for referring Ms. Kyle Kelly to [address] [place] [address] for evaluation. My notes for this consultation are attached. If you have questions, please do not hesitate to call me. I look forward to following your patient along with you.       Sincerely,    Genia Lazo MD

## 2021-06-17 ENCOUNTER — HOSPITAL ENCOUNTER (OUTPATIENT)
Dept: MRI IMAGING | Age: 76
Discharge: HOME | End: 2021-06-17
Attending: PSYCHIATRY & NEUROLOGY
Payer: MEDICARE

## 2021-06-17 DIAGNOSIS — R25.2 SPASTICITY: ICD-10-CM

## 2021-06-17 DIAGNOSIS — R29.898 WEAKNESS OF BOTH ARMS: ICD-10-CM

## 2021-06-17 DIAGNOSIS — R29.898 WEAKNESS OF BOTH LOWER EXTREMITIES: ICD-10-CM

## 2021-06-17 PROCEDURE — 72141 MRI NECK SPINE W/O DYE: CPT

## 2021-06-30 ENCOUNTER — TELEPHONE (OUTPATIENT)
Dept: NEUROLOGY | Age: 76
End: 2021-06-30

## 2021-06-30 NOTE — TELEPHONE ENCOUNTER
----- Message from Ady Johnson sent at 6/30/2021  1:40 PM EDT -----  Regarding: Dr. Anai Gomez  General Message/Vendor Calls    Caller's first and last name:  PT      Reason for call:  Requesting copy of pt's MRI done on Thurs, 06/17/21 along \"with any nerve conduction studies and conclusions\" be sent to Dr. Pb Block, Neurosurgeon or speak with NP, Ainsley Stake [phone] Fax# (Unknown)      Callback required yes/no and why:  Yes, confirming information was sent to Dr. Unique Rob contact number(s):   [phone]      Details to clarify the request:      Ady Johnson

## 2021-06-30 NOTE — TELEPHONE ENCOUNTER
Called pt on the mobile number and reached her . Discussed MRI C-spine results and NCS/EMG findings. She is followed by NSG at [facility] and Dr. Ashley Perez has already reviewed MRI results. Joseph Trotter - Please send a copy of my most recent visit note 5/12/21 and the NCS/EMG report from 4/28/21.    Thanks

## 2021-07-01 NOTE — TELEPHONE ENCOUNTER
OV note 5/12/21 and NCS/EMG report from 4/28/21 faxed to Dr. Angelo Duty w/ confirmation and sent to scanning.

## 2022-02-05 ENCOUNTER — PATIENT MESSAGE (OUTPATIENT)
Dept: NEUROLOGY | Age: 77
End: 2022-02-05